# Patient Record
Sex: FEMALE | Race: BLACK OR AFRICAN AMERICAN | NOT HISPANIC OR LATINO | Employment: OTHER | URBAN - METROPOLITAN AREA
[De-identification: names, ages, dates, MRNs, and addresses within clinical notes are randomized per-mention and may not be internally consistent; named-entity substitution may affect disease eponyms.]

---

## 2017-01-05 ENCOUNTER — OFFICE VISIT (OUTPATIENT)
Dept: SURGERY | Facility: CLINIC | Age: 62
End: 2017-01-05
Payer: COMMERCIAL

## 2017-01-05 VITALS
HEIGHT: 67 IN | BODY MASS INDEX: 36.04 KG/M2 | SYSTOLIC BLOOD PRESSURE: 134 MMHG | TEMPERATURE: 98 F | WEIGHT: 229.63 LBS | HEART RATE: 78 BPM | DIASTOLIC BLOOD PRESSURE: 71 MMHG

## 2017-01-05 DIAGNOSIS — Z09 POSTOP CHECK: Primary | ICD-10-CM

## 2017-01-05 DIAGNOSIS — E04.2 MULTINODULAR GOITER (NONTOXIC): ICD-10-CM

## 2017-01-05 PROCEDURE — 99999 PR PBB SHADOW E&M-EST. PATIENT-LVL III: CPT | Mod: PBBFAC,,, | Performed by: SURGERY

## 2017-01-05 PROCEDURE — 99024 POSTOP FOLLOW-UP VISIT: CPT | Mod: S$GLB,,, | Performed by: SURGERY

## 2017-01-05 NOTE — PROGRESS NOTES
"Subjective:       Masha Garcia presents to the clinic 1 weeks following total thyroidectomy. Eating a regular diet without difficulty. Bowel movements are Normal.  Pain is controlled without any medications.. Patient admits voice changes. Patient denies trouble swallowing, trouble speaking and fingers, toes and perioral numbness or tingling. She is currently on Calcium or Rocaltrol. Patient is taking Levothyroxine (125 mcg daily).       Drain with less than 5 mL daily over the last 3 days.  Objective:        Visit Vitals    /71 (BP Location: Left arm, Patient Position: Sitting, BP Method: Automatic)    Pulse 78    Temp 98.4 °F (36.9 °C)    Ht 5' 7" (1.702 m)    Wt 104.1 kg (229 lb 9.6 oz)    BMI 35.96 kg/m2       General:   alert, appears stated age and cooperative. Chovstek's sign absent.   Incision:   well approximated, healing well, no signs of drainage, no erythema, no dehiscence, no hematoma, no ecchymosis, no seroma and swelling (moderate) along the superior portion of the incision  Drained removed   Voice:  strained       FINAL PATHOLOGIC DIAGNOSIS  1. AND 3. BIOPSIES OF POSSIBLE PARATHYROID TISSUE:  PARATHYROID TISSUE WITH NO SIGNIFICANT MORPHOLOGIC ALTERATION  2. 59 G THYROID:  HYPERPLASTIC NODULAR GOITER    Labs:  Lab Results   Component Value Date    CALCIUM 10.2 11/17/2016    CALCIUM 9.9 09/26/2016    CALCIUM 10.4 03/05/2016    TSH 1.968 03/05/2016    TSH 1.992 08/10/2013    TSH 1.52 07/28/2009    [id number] 42 03/05/2016    [id number] 42 07/15/2014    [id number] 41 01/04/2014    PTH 75.0 12/29/2016    PTH 47 07/15/2014    PHOS 3.3 07/15/2014    CAION 1.20 12/28/2016            Assessment:     Masha Garcia is doing well post-operatively after total thyroidectomy despite continued swelling.         Plan:        1. Continue any current medications.  2. Wound care and scar massage discussed.  3. Pt is to increase activities as tolerated.  4. Follow up with endocrinology for the " possibility for further treatment.  The patient will follow-up with me in 6 weeks to ensure that she is healing appropriately.

## 2017-01-05 NOTE — PATIENT INSTRUCTIONS
"Questions About Your Scar    1. Why is my scar red?    You will notice during the first few weeks after surgery, the scar area will become red, firm, and hard. Scars often seem to become worse before they get better. This is normal. After about 6 weeks, the scar will begin to "mature." This means that the scar will soften and become less red. Over the next 4 months, the scar will slowly soft and will not be as red. It may take up to a year for the scar to mature.    2. What will the scar look like?    Most scars will become soft, flat, white lines over time.    3. When will the scars go away?    A scar is usually permanent. As the scar becomes softer and less red, it will begin to blend into the skin around the scar. It will become less visible.    4. Does putting Vitamin E oil on the scar help?    It is not known for sure if Vitamin E oil helps scars heal faster or makes them less visible. If you choose to use Vitamin E oil on the scar, it won't hurt. Applying any oil or lotion that can add moisture to the skin will help. Two weeks after surgery, it is a good idea to massage a scar gently but firmly for 5 minutes, two to four times each day. Use the oil or lotion of your choice when doing the massage.    5. What about sunlight and scars?    It is important to keep out of direct sunlight for up to one year after surgery. Too much direct sunlight makes a scar darker in color than the skin around the scar. You should use sunscreen with at least SPF #15 when outdoors.    6. What can I do to make the scar look better?    IT simply takes time for a scar to heal. If the scar is less than a year old, the scar may need to mature more. But some scars may get worse. If the scar line has become wide, more surgery can remove the wide area of the scar and make a more narrow line. If the scar has become higher, thicker, and redder, this may be a "keloidal" or "hypertrophic" scar.      Instructions for Scar Tissue Massage    The " "purpose of scar tissue massage is to soften the skin over scars and incision lines in order for the area to heal optimally. This exercise may also help prevent hypertrophic scarring (an area of excessive scar tissue).    A scar begins "maturing" at about three weeks after injury or surgery, and continues for 9 - 12 months.    You may begin performing scar tissue massage two to three weeks after your surgery. The pressure you apply initially should not be painful, rather a light massage to promote circulation. As you are able to tolerate more pressure you may press more firmly on any little bumps or lumps along your incision.    Scar tissue massage is done by using your index and middle fingers. Begin by firmly and systematically applying the lotion in a circular motion across the scar/incision line. The doctor or nurse will demonstrate this method. Imagine your massage breaking up the little cells that have begun to collect at the incision site so that your body may reabsorb those cells.    This exercise should be done consistently for five minutes at a time; once in the morning and once in the evening.    You may use any skin moisturizer or cocoa butter and/or vitamin E cream to perform scar tissue massage.    It is important to remember that the effectiveness of scar tissue massage is obtained from the method in which it is done rather than the type of moisturizer used.    Scar tissue massage should be continued throughout the scar or incision's maturing process (9 - 12 months) as instructed.    It is necessary to protect your scar/incision from the sun's damaging rays for 9 - 12 months after surgery. A SPF (Sun Protection Factor) of 15 or 30 is adequate for most skin types.  "

## 2017-01-05 NOTE — MR AVS SNAPSHOT
[facility]  [address]  [place] [address]  Phone: [phone]                  Masha Garcia   2017 10:00 AM   Office Visit    Description:  Female : 1955   Provider:  Christina Emmanuel MD   Department:  [facility]           Reason for Visit     Post-op Evaluation                To Do List           Future Appointments        Provider Department Dept Phone    2017 10:00 AM Christina Emmanuel MD [facility] [phone]      Goals (5 Years of Data)              9/26/16    3/5/16    8/28/15    HEMOGLOBIN A1C < 7.0   7.4  7.2  7.8    Related Problems    Uncontrolled type 2 diabetes mellitus without complication      [facility]     [facility] On Call Nurse Care Line -  Assistance  Registered nurses in the [facility] On Call Center provide clinical advisement, health education, appointment booking, and other advisory services.  Call for this free service at [phone].             Medications           Message regarding Medications     Verify the changes and/or additions to your medication regime listed below are the same as discussed with your clinician today.  If any of these changes or additions are incorrect, please notify your healthcare provider.             Verify that the below list of medications is an accurate representation of the medications you are currently taking.  If none reported, the list may be blank. If incorrect, please contact your healthcare provider. Carry this list with you in case of emergency.           Current Medications     acetaminophen (TYLENOL) 325 MG tablet Take 2 tablets (650 mg total) by mouth every 4 to 6 hours as needed for Pain.    anastrozole (ARIMIDEX) 1 mg Tab Take 1 mg by mouth every morning.     calcium carbonate-vitamin D3 (CALCIUM 600 + D,3,) 600 mg calcium- 200 unit Cap Take 1 tablet by mouth every morning.     glipiZIDE (GLUCOTROL) 10 MG TR24 TAKE ONE TABLET BY MOUTH TWICE  "DAILY    hydrochlorothiazide (HYDRODIURIL) 25 MG tablet TAKE ONE TABLET BY MOUTH ONCE DAILY    levothyroxine (SYNTHROID) 125 MCG tablet Take 1 tablet (125 mcg total) by mouth before breakfast.    loratadine (CLARITIN) 10 mg tablet Take 10 mg by mouth daily as needed.     losartan (COZAAR) 25 MG tablet TAKE ONE TABLET BY MOUTH ONCE DAILY    lovastatin (MEVACOR) 20 MG tablet TAKE ONE TABLET BY MOUTH ONCE DAILY    metformin (GLUCOPHAGE) 1000 MG tablet Take 1 tablet (1,000 mg total) by mouth 2 (two) times daily.    oxycodone-acetaminophen (PERCOCET) 5-325 mg per tablet Take 1 tablet by mouth every 4 (four) hours as needed for Pain.    TRADJENTA 5 mg Tab tablet TAKE ONE TABLET BY MOUTH ONCE DAILY           Clinical Reference Information           Vital Signs - Last Recorded  Most recent update: 1/5/2017 10:13 AM by Sue Dennis MA    BP Pulse Temp Ht Wt BMI    134/71 (BP Location: Left arm, Patient Position: Sitting, BP Method: Automatic) 78 98.4 °F (36.9 °C) 5' 7" (1.702 m) 104.1 kg (229 lb 9.6 oz) 35.96 kg/m2      Blood Pressure          Most Recent Value    BP  134/71      Allergies as of 1/5/2017     Ace Inhibitors    Penicillins      Immunizations Administered on Date of Encounter - 1/5/2017     None      [facility] Sign-Up     Activating your [facility] account is as easy as 1-2-3!     1) Visit [email or web address], select Sign Up Now, enter this activation code and your date of birth, then select Next.  [email or web address]  Expires: 2/19/2017 11:26 AM      2) Create a username and password to use when you visit [facility] in the future and select a security question in case you lose your password and select Next.    3) Enter your e-mail address and click Sign Up!    Additional Information  If you have questions, please e-mail [email or web address] or call [phone] to talk to our [facility] staff. Remember, [facility] is NOT to be used for urgent needs. For medical emergencies, dial 911.         Instructions  " "  Questions About Your Scar    1. Why is my scar red?    You will notice during the first few weeks after surgery, the scar area will become red, firm, and hard. Scars often seem to become worse before they get better. This is normal. After about 6 weeks, the scar will begin to "mature." This means that the scar will soften and become less red. Over the next 4 months, the scar will slowly soft and will not be as red. It may take up to a year for the scar to mature.    2. What will the scar look like?    Most scars will become soft, flat, white lines over time.    3. When will the scars go away?    A scar is usually permanent. As the scar becomes softer and less red, it will begin to blend into the skin around the scar. It will become less visible.    4. Does putting Vitamin E oil on the scar help?    It is not known for sure if Vitamin E oil helps scars heal faster or makes them less visible. If you choose to use Vitamin E oil on the scar, it won't hurt. Applying any oil or lotion that can add moisture to the skin will help. Two weeks after surgery, it is a good idea to massage a scar gently but firmly for 5 minutes, two to four times each day. Use the oil or lotion of your choice when doing the massage.    5. What about sunlight and scars?    It is important to keep out of direct sunlight for up to one year after surgery. Too much direct sunlight makes a scar darker in color than the skin around the scar. You should use sunscreen with at least SPF #15 when outdoors.    6. What can I do to make the scar look better?    IT simply takes time for a scar to heal. If the scar is less than a year old, the scar may need to mature more. But some scars may get worse. If the scar line has become wide, more surgery can remove the wide area of the scar and make a more narrow line. If the scar has become higher, thicker, and redder, this may be a "keloidal" or "hypertrophic" scar.      Instructions for Scar Tissue Massage    The " "purpose of scar tissue massage is to soften the skin over scars and incision lines in order for the area to heal optimally. This exercise may also help prevent hypertrophic scarring (an area of excessive scar tissue).    A scar begins "maturing" at about three weeks after injury or surgery, and continues for 9 - 12 months.    You may begin performing scar tissue massage two to three weeks after your surgery. The pressure you apply initially should not be painful, rather a light massage to promote circulation. As you are able to tolerate more pressure you may press more firmly on any little bumps or lumps along your incision.    Scar tissue massage is done by using your index and middle fingers. Begin by firmly and systematically applying the lotion in a circular motion across the scar/incision line. The doctor or nurse will demonstrate this method. Imagine your massage breaking up the little cells that have begun to collect at the incision site so that your body may reabsorb those cells.    This exercise should be done consistently for five minutes at a time; once in the morning and once in the evening.    You may use any skin moisturizer or cocoa butter and/or vitamin E cream to perform scar tissue massage.    It is important to remember that the effectiveness of scar tissue massage is obtained from the method in which it is done rather than the type of moisturizer used.    Scar tissue massage should be continued throughout the scar or incision's maturing process (9 - 12 months) as instructed.    It is necessary to protect your scar/incision from the sun's damaging rays for 9 - 12 months after surgery. A SPF (Sun Protection Factor) of 15 or 30 is adequate for most skin types.       "

## 2017-01-09 RX ORDER — METFORMIN HYDROCHLORIDE 1000 MG/1
TABLET ORAL
Qty: 180 TABLET | Refills: 0 | Status: SHIPPED | OUTPATIENT
Start: 2017-01-09 | End: 2017-04-24 | Stop reason: SDUPTHER

## 2017-01-09 RX ORDER — LINAGLIPTIN 5 MG/1
TABLET, FILM COATED ORAL
Qty: 30 TABLET | Refills: 0 | Status: SHIPPED | OUTPATIENT
Start: 2017-01-09 | End: 2017-02-07 | Stop reason: SDUPTHER

## 2017-02-03 ENCOUNTER — TELEPHONE (OUTPATIENT)
Dept: FAMILY MEDICINE | Facility: CLINIC | Age: 62
End: 2017-02-03

## 2017-02-03 DIAGNOSIS — I10 ESSENTIAL HYPERTENSION: Primary | ICD-10-CM

## 2017-02-03 DIAGNOSIS — E78.5 HYPERLIPIDEMIA WITH TARGET LDL LESS THAN 100: ICD-10-CM

## 2017-02-03 RX ORDER — LOVASTATIN 20 MG/1
TABLET ORAL
Qty: 90 TABLET | Refills: 0 | Status: SHIPPED | OUTPATIENT
Start: 2017-02-03 | End: 2017-05-11 | Stop reason: SDUPTHER

## 2017-02-03 NOTE — TELEPHONE ENCOUNTER
----- Message from Linda Mckeon sent at 2/3/2017 10:22 AM CST -----  Contact: self  Pt is requesting lab orders prior to 4/4/2017 OV. Please call pt once orders are placed. [phone]          Thanks

## 2017-02-07 RX ORDER — LINAGLIPTIN 5 MG/1
TABLET, FILM COATED ORAL
Qty: 30 TABLET | Refills: 0 | Status: SHIPPED | OUTPATIENT
Start: 2017-02-07 | End: 2017-03-13 | Stop reason: SDUPTHER

## 2017-02-09 ENCOUNTER — OFFICE VISIT (OUTPATIENT)
Dept: SURGERY | Facility: CLINIC | Age: 62
End: 2017-02-09
Payer: COMMERCIAL

## 2017-02-09 VITALS
SYSTOLIC BLOOD PRESSURE: 125 MMHG | BODY MASS INDEX: 36.54 KG/M2 | DIASTOLIC BLOOD PRESSURE: 72 MMHG | TEMPERATURE: 98 F | WEIGHT: 232.81 LBS | HEIGHT: 67 IN | HEART RATE: 75 BPM

## 2017-02-09 DIAGNOSIS — Z09 POSTOP CHECK: Primary | ICD-10-CM

## 2017-02-09 DIAGNOSIS — E04.2 MULTINODULAR GOITER (NONTOXIC): ICD-10-CM

## 2017-02-09 PROCEDURE — 99999 PR PBB SHADOW E&M-EST. PATIENT-LVL III: CPT | Mod: PBBFAC,,, | Performed by: SURGERY

## 2017-02-09 PROCEDURE — 99024 POSTOP FOLLOW-UP VISIT: CPT | Mod: S$GLB,,, | Performed by: SURGERY

## 2017-02-09 NOTE — PROGRESS NOTES
"Subjective:       Masha Garcia presents to the clinic 6 week following total thyroidectomy. Eating a regular diet without difficulty. Bowel movements are Normal.  Pain is controlled without any medications..  Patient states she does still have some hoarseness but it is improved since last visit. Patient denies trouble swallowing, trouble speaking and fingers, toes and perioral numbness or tingling. She is currently on Calcium or Rocaltrol. Patient is taking Levothyroxine (125 mcg daily).         Objective:        Visit Vitals    Ht 5' 7" (1.702 m)    Wt 105.6 kg (232 lb 12.8 oz)    BMI 36.46 kg/m2       General:   alert, appears stated age and cooperative. Chovstek's sign absent.   Incision:   well approximated, healing well, no signs of drainage, no erythema, no dehiscence, no hematoma, no ecchymosis, no seroma and swelling (minimal) along the superior portion of the incision     Voice:  strained but improved       FINAL PATHOLOGIC DIAGNOSIS  1. AND 3. BIOPSIES OF POSSIBLE PARATHYROID TISSUE:  PARATHYROID TISSUE WITH NO SIGNIFICANT MORPHOLOGIC ALTERATION  2. 59 G THYROID:  HYPERPLASTIC NODULAR GOITER    Labs:  Lab Results   Component Value Date    CALCIUM 10.2 11/17/2016    CALCIUM 9.9 09/26/2016    CALCIUM 10.4 03/05/2016    TSH 1.968 03/05/2016    TSH 1.992 08/10/2013    TSH 1.52 07/28/2009    [id number] 42 03/05/2016    [id number] 42 07/15/2014    [id number] 41 01/04/2014    PTH 75.0 12/29/2016    PTH 47 07/15/2014    PHOS 3.3 07/15/2014    CAION 1.20 12/28/2016            Assessment:     Masha Garcia is doing well post-operatively after total thyroidectomy , swelling much improved.  Voice continues to improve        Plan:        1. Continue any current medications.  2. Wound care and scar massage discussed.  3. Activity as tolerated.  4. Follow up with endocrinology scheduled to determine need for synthroid adjustment  5. Patient not interested in seeing a voice therapist     "

## 2017-03-02 DIAGNOSIS — I10 ESSENTIAL HYPERTENSION: ICD-10-CM

## 2017-03-02 RX ORDER — HYDROCHLOROTHIAZIDE 25 MG/1
TABLET ORAL
Qty: 90 TABLET | Refills: 0 | Status: SHIPPED | OUTPATIENT
Start: 2017-03-02 | End: 2017-05-11 | Stop reason: SDUPTHER

## 2017-03-07 DIAGNOSIS — I10 ESSENTIAL HYPERTENSION: Primary | ICD-10-CM

## 2017-03-07 RX ORDER — LOSARTAN POTASSIUM 25 MG/1
25 TABLET ORAL DAILY
Qty: 90 TABLET | Refills: 0 | Status: SHIPPED | OUTPATIENT
Start: 2017-03-07 | End: 2017-06-08 | Stop reason: SDUPTHER

## 2017-03-07 NOTE — TELEPHONE ENCOUNTER
----- Message from Teri Valdes sent at 3/6/2017  4:46 PM CST -----  Contact: Carissa (daughter )[phone]  Pt is requesting a refill on losartan (COZAAR) 25 MG tablet Please call  at your earliest convenience.  Thanks !

## 2017-03-13 RX ORDER — LINAGLIPTIN 5 MG/1
TABLET, FILM COATED ORAL
Qty: 30 TABLET | Refills: 0 | Status: SHIPPED | OUTPATIENT
Start: 2017-03-13 | End: 2017-04-12 | Stop reason: SDUPTHER

## 2017-04-01 ENCOUNTER — LAB VISIT (OUTPATIENT)
Dept: LAB | Facility: HOSPITAL | Age: 62
End: 2017-04-01
Attending: INTERNAL MEDICINE
Payer: COMMERCIAL

## 2017-04-01 DIAGNOSIS — E78.5 HYPERLIPIDEMIA WITH TARGET LDL LESS THAN 100: ICD-10-CM

## 2017-04-01 DIAGNOSIS — I10 ESSENTIAL HYPERTENSION: ICD-10-CM

## 2017-04-01 LAB
ALBUMIN SERPL BCP-MCNC: 3.4 G/DL
ALP SERPL-CCNC: 86 U/L
ALT SERPL W/O P-5'-P-CCNC: 19 U/L
ANION GAP SERPL CALC-SCNC: 9 MMOL/L
AST SERPL-CCNC: 22 U/L
BASOPHILS # BLD AUTO: 0.04 K/UL
BASOPHILS NFR BLD: 0.3 %
BILIRUB SERPL-MCNC: 0.9 MG/DL
BUN SERPL-MCNC: 12 MG/DL
CALCIUM SERPL-MCNC: 10.3 MG/DL
CHLORIDE SERPL-SCNC: 105 MMOL/L
CHOLEST/HDLC SERPL: 2.9 {RATIO}
CO2 SERPL-SCNC: 29 MMOL/L
CREAT SERPL-MCNC: 0.9 MG/DL
DIFFERENTIAL METHOD: ABNORMAL
EOSINOPHIL # BLD AUTO: 0.1 K/UL
EOSINOPHIL NFR BLD: 1.2 %
ERYTHROCYTE [DISTWIDTH] IN BLOOD BY AUTOMATED COUNT: 14.8 %
EST. GFR  (AFRICAN AMERICAN): >60 ML/MIN/1.73 M^2
EST. GFR  (NON AFRICAN AMERICAN): >60 ML/MIN/1.73 M^2
GLUCOSE SERPL-MCNC: 122 MG/DL
HCT VFR BLD AUTO: 33.6 %
HDL/CHOLESTEROL RATIO: 34.3 %
HDLC SERPL-MCNC: 137 MG/DL
HDLC SERPL-MCNC: 47 MG/DL
HGB BLD-MCNC: 11.2 G/DL
LDLC SERPL CALC-MCNC: 67.4 MG/DL
LYMPHOCYTES # BLD AUTO: 3.5 K/UL
LYMPHOCYTES NFR BLD: 30.6 %
MCH RBC QN AUTO: 27.6 PG
MCHC RBC AUTO-ENTMCNC: 33.3 %
MCV RBC AUTO: 83 FL
MONOCYTES # BLD AUTO: 0.5 K/UL
MONOCYTES NFR BLD: 4.2 %
NEUTROPHILS # BLD AUTO: 7.3 K/UL
NEUTROPHILS NFR BLD: 63.3 %
NONHDLC SERPL-MCNC: 90 MG/DL
PLATELET # BLD AUTO: 295 K/UL
PMV BLD AUTO: 10.8 FL
POTASSIUM SERPL-SCNC: 3.6 MMOL/L
PROT SERPL-MCNC: 7 G/DL
RBC # BLD AUTO: 4.06 M/UL
SODIUM SERPL-SCNC: 143 MMOL/L
TRIGL SERPL-MCNC: 113 MG/DL
WBC # BLD AUTO: 11.56 K/UL

## 2017-04-01 PROCEDURE — 36415 COLL VENOUS BLD VENIPUNCTURE: CPT | Mod: PO

## 2017-04-01 PROCEDURE — 85025 COMPLETE CBC W/AUTO DIFF WBC: CPT

## 2017-04-01 PROCEDURE — 83036 HEMOGLOBIN GLYCOSYLATED A1C: CPT

## 2017-04-01 PROCEDURE — 80061 LIPID PANEL: CPT

## 2017-04-01 PROCEDURE — 80053 COMPREHEN METABOLIC PANEL: CPT

## 2017-04-03 ENCOUNTER — OFFICE VISIT (OUTPATIENT)
Dept: FAMILY MEDICINE | Facility: CLINIC | Age: 62
End: 2017-04-03
Payer: COMMERCIAL

## 2017-04-03 VITALS
WEIGHT: 231.25 LBS | BODY MASS INDEX: 36.29 KG/M2 | HEIGHT: 67 IN | HEART RATE: 70 BPM | OXYGEN SATURATION: 96 % | SYSTOLIC BLOOD PRESSURE: 104 MMHG | DIASTOLIC BLOOD PRESSURE: 74 MMHG | TEMPERATURE: 98 F

## 2017-04-03 DIAGNOSIS — E78.5 HYPERLIPIDEMIA WITH TARGET LDL LESS THAN 100: ICD-10-CM

## 2017-04-03 DIAGNOSIS — D57.3 SICKLE CELL TRAIT: ICD-10-CM

## 2017-04-03 DIAGNOSIS — I10 ESSENTIAL HYPERTENSION: ICD-10-CM

## 2017-04-03 DIAGNOSIS — E55.9 VITAMIN D DEFICIENCY DISEASE: ICD-10-CM

## 2017-04-03 DIAGNOSIS — E66.01 SEVERE OBESITY (BMI 35.0-35.9 WITH COMORBIDITY): ICD-10-CM

## 2017-04-03 DIAGNOSIS — M85.80 OSTEOPENIA, UNSPECIFIED LOCATION: ICD-10-CM

## 2017-04-03 DIAGNOSIS — E89.0 HYPOTHYROIDISM, POSTSURGICAL: ICD-10-CM

## 2017-04-03 DIAGNOSIS — C50.511 MALIGNANT NEOPLASM OF LOWER-OUTER QUADRANT OF RIGHT FEMALE BREAST: ICD-10-CM

## 2017-04-03 DIAGNOSIS — Z00.00 ROUTINE MEDICAL EXAM: Primary | ICD-10-CM

## 2017-04-03 LAB
CREAT UR-MCNC: 184 MG/DL
ESTIMATED AVG GLUCOSE: 157 MG/DL
HBA1C MFR BLD HPLC: 7.1 %
MICROALBUMIN UR DL<=1MG/L-MCNC: 33 UG/ML
MICROALBUMIN/CREATININE RATIO: 17.9 UG/MG

## 2017-04-03 PROCEDURE — 82570 ASSAY OF URINE CREATININE: CPT

## 2017-04-03 PROCEDURE — 99999 PR PBB SHADOW E&M-EST. PATIENT-LVL III: CPT | Mod: PBBFAC,,, | Performed by: INTERNAL MEDICINE

## 2017-04-03 PROCEDURE — 3078F DIAST BP <80 MM HG: CPT | Mod: S$GLB,,, | Performed by: INTERNAL MEDICINE

## 2017-04-03 PROCEDURE — 3074F SYST BP LT 130 MM HG: CPT | Mod: S$GLB,,, | Performed by: INTERNAL MEDICINE

## 2017-04-03 PROCEDURE — 99396 PREV VISIT EST AGE 40-64: CPT | Mod: S$GLB,,, | Performed by: INTERNAL MEDICINE

## 2017-04-03 NOTE — MR AVS SNAPSHOT
[facility]  [address]  [place] [address]  Phone: [phone]  Fax: [phone]                  Masha Garcia   4/3/2017 9:00 AM   Office Visit    Description:  Female : 1955   Provider:  Brenda Bailey MD   Department:  [facility]           Reason for Visit     Follow-up           Diagnoses this Visit        Comments    Routine medical exam    -  Primary     Uncontrolled type 2 diabetes mellitus without complication, without long-term current use of insulin         Essential hypertension         Hyperlipidemia with target LDL less than 100         Osteopenia, unspecified location         Vitamin D deficiency disease         Malignant neoplasm of lower-outer quadrant of right female breast         Sickle cell trait         Hypothyroidism, postsurgical         Severe obesity (BMI 35.0-35.9 with comorbidity)                To Do List           Goals (5 Years of Data)              9/26/16    3/5/16    8/28/15    HEMOGLOBIN A1C < 7.0   7.4  7.2  7.8    Related Problems    Uncontrolled type 2 diabetes mellitus without complication      Follow-Up and Disposition     Return in about 6 months (around 10/3/2017), or if symptoms worsen or fail to improve, for follow up chronic medical conditions..      [facility]     [facility] On Call Nurse Care Line -  Assistance  Unless otherwise directed by your provider, please contact [facility] On-Call, our nurse care line that is available for  assistance.     Registered nurses in the [facility] On Call Center provide: appointment scheduling, clinical advisement, health education, and other advisory services.  Call: [phone] (toll free)               Medications           Message regarding Medications     Verify the changes and/or additions to your medication regime listed below are the same as discussed with your clinician today.  If any of these changes or additions are incorrect, please notify your healthcare  "provider.             Verify that the below list of medications is an accurate representation of the medications you are currently taking.  If none reported, the list may be blank. If incorrect, please contact your healthcare provider. Carry this list with you in case of emergency.           Current Medications     acetaminophen (TYLENOL) 325 MG tablet Take 2 tablets (650 mg total) by mouth every 4 to 6 hours as needed for Pain.    anastrozole (ARIMIDEX) 1 mg Tab Take 1 mg by mouth every morning.     calcium carbonate-vitamin D3 (CALCIUM 600 + D,3,) 600 mg calcium- 200 unit Cap Take 1 tablet by mouth every morning.     glipiZIDE (GLUCOTROL) 10 MG TR24 TAKE ONE TABLET BY MOUTH TWICE DAILY    hydrochlorothiazide (HYDRODIURIL) 25 MG tablet TAKE ONE TABLET BY MOUTH ONCE DAILY    levothyroxine (SYNTHROID) 112 MCG tablet Take 1 tablet (112 mcg total) by mouth before breakfast.    loratadine (CLARITIN) 10 mg tablet Take 10 mg by mouth daily as needed.     losartan (COZAAR) 25 MG tablet Take 1 tablet (25 mg total) by mouth once daily.    lovastatin (MEVACOR) 20 MG tablet TAKE ONE TABLET BY MOUTH ONCE DAILY    metformin (GLUCOPHAGE) 1000 MG tablet TAKE ONE TABLET BY MOUTH TWICE DAILY    TRADJENTA 5 mg Tab tablet TAKE ONE TABLET BY MOUTH ONCE DAILY    oxycodone-acetaminophen (PERCOCET) 5-325 mg per tablet Take 1 tablet by mouth every 4 (four) hours as needed for Pain.           Clinical Reference Information           Your Vitals Were     BP Pulse Temp Height Weight SpO2    104/74 70 98.3 °F (36.8 °C) (Oral) 5' 7" (1.702 m) 104.9 kg (231 lb 4.2 oz) 96%    BMI                36.22 kg/m2          Blood Pressure          Most Recent Value    BP  104/74      Allergies as of 4/3/2017     Ace Inhibitors    Penicillins      Immunizations Administered on Date of Encounter - 4/3/2017     None      Orders Placed During Today's Visit      Normal Orders This Visit    Microalbumin/creatinine urine ratio       Language Assistance Services  "    ATTENTION: Language assistance services are available, free of charge. Please call [phone].      ATENCIÓN: Si habla tessie, tiene a daley disposición servicios gratuitos de asistencia lingüística. Llame al [phone].     CHÚ Ý: N?u b?n nói Ti?ng Vi?t, có các d?ch v? h? tr? ngôn ng? mi?n phí dành cho b?n. G?i s? [phone].         [facility] complies with applicable Federal civil rights laws and does not discriminate on the basis of race, color, national origin, age, disability, or sex.

## 2017-04-03 NOTE — PROGRESS NOTES
HISTORY OF PRESENT ILLNESS:  Masha Garcia is a 62 y.o. female who presents to the clinic today for a routine medical physical exam. Her last physical exam was approximately 1 years(s) ago.    Contraception: no method      PAST MEDICAL HISTORY:  Past Medical History:   Diagnosis Date    AR (allergic rhinitis)     Breast cancer 06/2013    right; followed by oncology, Dr. Oliveira; s/p lumpectomy and  radiation    History of colonic polyps     Hyperlipidemia LDL goal < 100     Hypertension     Lower extremity edema     Mild chronic anemia     Obesity     Osteopenia     Sickle cell trait     Thyroid nodule     Type II or unspecified type diabetes mellitus without mention of complication, uncontrolled     Vitamin D deficiency disease        PAST SURGICAL HISTORY:  Past Surgical History:   Procedure Laterality Date    benign right breast biopsy  2012    lumpectomy right breast  6/2013    right ankle fracture repair      TOTAL ABDOMINAL HYSTERECTOMY      TOTAL THYROIDECTOMY  12/28/2016       SOCIAL HISTORY:  Social History     Social History    Marital status:      Spouse name: N/A    Number of children: 3    Years of education: N/A     Occupational History    [employer]      Social History Main Topics    Smoking status: Never Smoker    Smokeless tobacco: Not on file    Alcohol use No    Drug use: Not on file    Sexual activity: Not on file     Other Topics Concern    Not on file     Social History Narrative       FAMILY HISTORY:  Family History   Problem Relation Age of Onset    Diabetes Mother     Hypertension Mother     Hypertension Father     Prostate cancer Father     Diabetes Brother     Cancer Neg Hx     Breast cancer Neg Hx     Colon cancer Neg Hx     Ovarian cancer Neg Hx     Amblyopia Neg Hx     Blindness Neg Hx     Cataracts Neg Hx     Glaucoma Neg Hx     Macular degeneration Neg Hx     Retinal detachment Neg Hx     Strabismus Neg Hx     Stroke Neg Hx     Thyroid  disease Neg Hx        ALLERGIES AND MEDICATIONS: updated and reviewed.  Review of patient's allergies indicates:   Allergen Reactions    Ace inhibitors Other (See Comments)     cough    Penicillins Hives     Medication List with Changes/Refills   Current Medications    ACETAMINOPHEN (TYLENOL) 325 MG TABLET    Take 2 tablets (650 mg total) by mouth every 4 to 6 hours as needed for Pain.    ANASTROZOLE (ARIMIDEX) 1 MG TAB    Take 1 mg by mouth every morning.     CALCIUM CARBONATE-VITAMIN D3 (CALCIUM 600 + D,3,) 600 MG CALCIUM- 200 UNIT CAP    Take 1 tablet by mouth every morning.     GLIPIZIDE (GLUCOTROL) 10 MG TR24    TAKE ONE TABLET BY MOUTH TWICE DAILY    HYDROCHLOROTHIAZIDE (HYDRODIURIL) 25 MG TABLET    TAKE ONE TABLET BY MOUTH ONCE DAILY    LEVOTHYROXINE (SYNTHROID) 112 MCG TABLET    Take 1 tablet (112 mcg total) by mouth before breakfast.    LORATADINE (CLARITIN) 10 MG TABLET    Take 10 mg by mouth daily as needed.     LOSARTAN (COZAAR) 25 MG TABLET    Take 1 tablet (25 mg total) by mouth once daily.    LOVASTATIN (MEVACOR) 20 MG TABLET    TAKE ONE TABLET BY MOUTH ONCE DAILY    METFORMIN (GLUCOPHAGE) 1000 MG TABLET    TAKE ONE TABLET BY MOUTH TWICE DAILY    OXYCODONE-ACETAMINOPHEN (PERCOCET) 5-325 MG PER TABLET    Take 1 tablet by mouth every 4 (four) hours as needed for Pain.    TRADJENTA 5 MG TAB TABLET    TAKE ONE TABLET BY MOUTH ONCE DAILY             SCREENING HISTORY:  Health Maintenance       Date Due Completion Date    Zoster Vaccine 3/9/2015 ---    Urine Microalbumin 2/9/2016 2/9/2015    Hemoglobin A1c 3/26/2017 9/26/2016    Eye Exam 5/11/2017 5/11/2016    Override on 12/9/2014: Done    Override on 11/1/2010: Done    Mammogram 6/15/2017 6/15/2016 (Done)    Override on 6/15/2016: Done (done at [facility] normal scanned report to chart)    Override on 3/18/2015: Done    Override on 3/1/2014: Done ([facility])    Override on 4/1/2013: Done    Override on 2/1/2012: Done    Foot Exam 9/28/2017  "9/28/2016 (Done)    Override on 9/28/2016: Done (normal see note in visit)    Lipid Panel 4/1/2018 4/1/2017    Colonoscopy 6/13/2019 6/13/2014 (Done)    Override on 6/13/2014: Done (Normal per Dr. Vallejo)    TETANUS VACCINE 7/24/2019 7/24/2009    Pneumococcal PPSV23 (Medium Risk) (2) 3/9/2020 8/13/2008            REVIEW OF SYSTEMS:   The patient reports fair dietary habits.  The patient does not exercise regularly.  General ROS: negative for - chills, fever, malaise or weight loss  Psychological ROS: negative for - anxiety, depression, sleep disturbances or suicidal ideation  Ophthalmic ROS: negative for - blurry vision or eye pain  ENT ROS: negative for - epistaxis, headaches, nasal congestion, oral lesions or sore throat  Allergy and Immunology ROS: negative for - hives  Hematological and Lymphatic ROS: negative for - swollen lymph nodes  Endocrine ROS: negative for - polydipsia/polyuria or temperature intolerance  Respiratory ROS: no cough, shortness of breath, or wheezing  Cardiovascular ROS: no chest pain or dyspnea on exertion  Gastrointestinal ROS: no abdominal pain, change in bowel habits, or black or bloody stools  Genito-Urinary ROS: no dysuria, trouble voiding, or hematuria  Breast ROS: negative for breast lumps  Musculoskeletal ROS: negative for - gait disturbance, joint swelling, muscle pain or muscular weakness  Neurological ROS: no TIA or stroke symptoms  Dermatological ROS: negative for mole changes and rash    Physical Examination:   Vitals:    04/03/17 0909   BP: 104/74   Pulse: 70   Temp: 98.3 °F (36.8 °C)     Weight: 104.9 kg (231 lb 4.2 oz)   Height: 5' 7" (170.2 cm)   Body mass index is 36.22 kg/(m^2).    General appearance - alert, well appearing, and in no distress and obese  Mental status - alert, oriented to person, place, and time, normal mood, behavior, speech, dress, motor activity, and thought processes  Eyes - pupils equal and reactive, extraocular eye movements intact, sclera " anicteric  Mouth - mucous membranes moist, pharynx normal without lesions  Neck - supple, no significant adenopathy, carotids upstroke normal bilaterally, no bruits, thyroid exam: Thyroidectomy Scar Noted - healing well  Lymphatics - no palpable lymphadenopathy  Chest - clear to auscultation, no wheezes, rales or rhonchi, symmetric air entry  Heart - normal rate and regular rhythm, no gallops noted  Abdomen - soft, nontender, nondistended, no masses or organomegaly  Breasts - not examined  Back exam - full range of motion, no tenderness, palpable spasm or pain on motion  Neurological - alert, oriented, normal speech, no focal findings or movement disorder noted, cranial nerves II through XII intact  Musculoskeletal - no muscular tenderness noted, Mild-Moderate osteoarthritic changes noted to both knee joints. No joint effusions noted.   Extremities - pedal edema trace +, intact peripheral pulses  Skin - normal coloration and turgor, no rashes, no suspicious skin lesions noted      Results for orders placed or performed in visit on 04/01/17   CBC auto differential   Result Value Ref Range    WBC 11.56 3.90 - 12.70 K/uL    RBC 4.06 4.00 - 5.40 M/uL    Hemoglobin 11.2 (L) 12.0 - 16.0 g/dL    Hematocrit 33.6 (L) 37.0 - 48.5 %    MCV 83 82 - 98 fL    MCH 27.6 27.0 - 31.0 pg    MCHC 33.3 32.0 - 36.0 %    RDW 14.8 (H) 11.5 - 14.5 %    Platelets 295 150 - 350 K/uL    MPV 10.8 9.2 - 12.9 fL    Gran # 7.3 1.8 - 7.7 K/uL    Lymph # 3.5 1.0 - 4.8 K/uL    Mono # 0.5 0.3 - 1.0 K/uL    Eos # 0.1 0.0 - 0.5 K/uL    Baso # 0.04 0.00 - 0.20 K/uL    Gran% 63.3 38.0 - 73.0 %    Lymph% 30.6 18.0 - 48.0 %    Mono% 4.2 4.0 - 15.0 %    Eosinophil% 1.2 0.0 - 8.0 %    Basophil% 0.3 0.0 - 1.9 %    Differential Method Automated    Comprehensive metabolic panel   Result Value Ref Range    Sodium 143 136 - 145 mmol/L    Potassium 3.6 3.5 - 5.1 mmol/L    Chloride 105 95 - 110 mmol/L    CO2 29 23 - 29 mmol/L    Glucose 122 (H) 70 - 110 mg/dL     BUN, Bld 12 8 - 23 mg/dL    Creatinine 0.9 0.5 - 1.4 mg/dL    Calcium 10.3 8.7 - 10.5 mg/dL    Total Protein 7.0 6.0 - 8.4 g/dL    Albumin 3.4 (L) 3.5 - 5.2 g/dL    Total Bilirubin 0.9 0.1 - 1.0 mg/dL    Alkaline Phosphatase 86 55 - 135 U/L    AST 22 10 - 40 U/L    ALT 19 10 - 44 U/L    Anion Gap 9 8 - 16 mmol/L    eGFR if African American >60.0 >60 mL/min/1.73 m^2    eGFR if non African American >60.0 >60 mL/min/1.73 m^2   Lipid panel   Result Value Ref Range    Cholesterol 137 120 - 199 mg/dL    Triglycerides 113 30 - 150 mg/dL    HDL 47 40 - 75 mg/dL    LDL Cholesterol 67.4 63.0 - 159.0 mg/dL    HDL/Chol Ratio 34.3 20.0 - 50.0 %    Total Cholesterol/HDL Ratio 2.9 2.0 - 5.0    Non-HDL Cholesterol 90 mg/dL          ASSESSMENT AND PLAN:  1. Routine medical exam  Counseled on age appropriate medical preventative services including age appropriate cancer screenings, age appropriate eye and dental exams, over all nutritional health, need for a consistent exercise regimen, and an over all push towards maintaining a vigorous and active lifestyle.  Counseled on age appropriate vaccines and discussed upcoming health care needs based on age/gender. Discussed good sleep hygiene and stress management.     2. Uncontrolled type 2 diabetes mellitus without complication, without long-term current use of insulin  Diabetes currently is under fair control. We discussed diabetic diet and regular exercise. We discussed home blood sugar monitoring, if appropriate. The current medical regimen is effective;  continue present plan and medications. A1c test pending.   - Microalbumin/creatinine urine ratio    3. Essential hypertension  Discussed sodium restriction, maintaining ideal body weight and regular exercise program as physiologic means to achieve blood pressure control. The patient will strive towards this. The current medical regimen is effective;  continue present plan and medications. Recommended patient to check home readings to  monitor and see me for followup as scheduled or sooner as needed. Patient was educated that both decongestant and anti-inflammatory medication may raise blood pressure.     4. Hyperlipidemia with target LDL less than 100  We discussed low fat diet and regular exercise.The current medical regimen is effective;  continue present plan and medications.      5. Osteopenia, unspecified location/6. Vitamin D deficiency disease  We discussed adequate calcium and vitamin D supplementation. She is up to date on her BMD.     7. Malignant neoplasm of lower-outer quadrant of right female breast  The current medical regimen is effective;  continue present plan and medications. Followed by oncology.    8. Sickle cell trait  Stable. Asymptomatic. Observe.     9. Hypothyroidism, postsurgical  Patient is clinically euthyroid. Continue current regimen. Followed by endocrinology. S/p thyroidectomy 12/2016 - healing well.    10. Severe obesity (BMI 35.0-35.9 with comorbidity)  The patient is asked to make an attempt to improve diet and exercise patterns to aid in medical management of this problem.           Return in about 6 months (around 10/3/2017), or if symptoms worsen or fail to improve, for follow up chronic medical conditions.. or sooner as needed.

## 2017-04-12 RX ORDER — LINAGLIPTIN 5 MG/1
TABLET, FILM COATED ORAL
Qty: 30 TABLET | Refills: 5 | Status: SHIPPED | OUTPATIENT
Start: 2017-04-12 | End: 2017-10-19 | Stop reason: SDUPTHER

## 2017-04-25 RX ORDER — METFORMIN HYDROCHLORIDE 1000 MG/1
TABLET ORAL
Qty: 180 TABLET | Refills: 0 | Status: SHIPPED | OUTPATIENT
Start: 2017-04-25 | End: 2017-07-17 | Stop reason: SDUPTHER

## 2017-05-11 DIAGNOSIS — I10 ESSENTIAL HYPERTENSION: ICD-10-CM

## 2017-05-11 DIAGNOSIS — E78.5 HYPERLIPIDEMIA WITH TARGET LDL LESS THAN 100: ICD-10-CM

## 2017-05-11 RX ORDER — HYDROCHLOROTHIAZIDE 25 MG/1
TABLET ORAL
Qty: 90 TABLET | Refills: 0 | Status: SHIPPED | OUTPATIENT
Start: 2017-05-11 | End: 2017-09-11 | Stop reason: SDUPTHER

## 2017-05-11 RX ORDER — GLIPIZIDE 10 MG/1
TABLET, FILM COATED, EXTENDED RELEASE ORAL
Qty: 180 TABLET | Refills: 0 | Status: SHIPPED | OUTPATIENT
Start: 2017-05-11 | End: 2017-08-16 | Stop reason: SDUPTHER

## 2017-05-12 RX ORDER — LOVASTATIN 20 MG/1
20 TABLET ORAL DAILY
Qty: 90 TABLET | Refills: 1 | Status: SHIPPED | OUTPATIENT
Start: 2017-05-12 | End: 2017-11-28 | Stop reason: SDUPTHER

## 2017-05-31 ENCOUNTER — OFFICE VISIT (OUTPATIENT)
Dept: OPTOMETRY | Facility: CLINIC | Age: 62
End: 2017-05-31
Payer: COMMERCIAL

## 2017-05-31 DIAGNOSIS — H25.13 NUCLEAR SCLEROSIS, BILATERAL: ICD-10-CM

## 2017-05-31 DIAGNOSIS — Z01.00 DIABETIC EYE EXAM: Primary | ICD-10-CM

## 2017-05-31 DIAGNOSIS — H52.00 HYPEROPIA WITH PRESBYOPIA, UNSPECIFIED LATERALITY: ICD-10-CM

## 2017-05-31 DIAGNOSIS — H52.4 HYPEROPIA WITH PRESBYOPIA, UNSPECIFIED LATERALITY: ICD-10-CM

## 2017-05-31 DIAGNOSIS — E11.9 DIABETIC EYE EXAM: Primary | ICD-10-CM

## 2017-05-31 PROCEDURE — 92015 DETERMINE REFRACTIVE STATE: CPT | Mod: S$GLB,,, | Performed by: OPTOMETRIST

## 2017-05-31 PROCEDURE — 99999 PR PBB SHADOW E&M-EST. PATIENT-LVL II: CPT | Mod: PBBFAC,,, | Performed by: OPTOMETRIST

## 2017-05-31 PROCEDURE — 92014 COMPRE OPH EXAM EST PT 1/>: CPT | Mod: S$GLB,,, | Performed by: OPTOMETRIST

## 2017-05-31 NOTE — PROGRESS NOTES
HPI     HPI     Pt here for Diabetic eye exam .  Last Blood Sugar - 115  Hemoglobin A1C       Date                     Value               Ref Range             Status                04/01/2017               7.1 (H)             4.5 - 6.2 %           Final                   09/26/2016               7.4 (H)             4.5 - 6.2 %           Final                 03/05/2016               7.2 (H)             4.5 - 6.2 %           Final            ----------    BETTE 2016   Ms. Flanagan is here today to continue ocular health care for diabetes.  Pt sts has not noticed much change with current gls x2-3 yrs   (-)Flashes (+)Floaters  (-)Itch, (-)tear, (-)burn, (+)Dryness.  (-) OTC Drops hx of visine use   (-)Photophobia  (-)Glare     Last edited by Cristina Mitchell, PCT on 5/31/2017  9:24 AM. (History)            Assessment /Plan     For exam results, see Encounter Report.    Diabetic eye exam  -No retinopathy noted today.  Continued control with primary care physician and annual comprehensive eye exam.    Nuclear sclerosis, bilateral  -Educated patient on presence of cataracts at today's exam, monitor at annual dilated fundus exam. 5+ years surgical estimate.    Hyperopia with presbyopia, unspecified laterality  Eyeglass Final Rx     Eyeglass Final Rx       Sphere Cylinder Axis Dist VA Add    Right +1.50 Sphere  20/25 +2.50    Left +1.75 +0.75 010 20/25 +2.50    Type:  PAL    Expiration Date:  6/1/2018                  RTC 1 yr

## 2017-06-08 DIAGNOSIS — I10 ESSENTIAL HYPERTENSION: ICD-10-CM

## 2017-06-08 RX ORDER — LOSARTAN POTASSIUM 25 MG/1
TABLET ORAL
Qty: 90 TABLET | Refills: 0 | Status: SHIPPED | OUTPATIENT
Start: 2017-06-08 | End: 2017-09-11 | Stop reason: SDUPTHER

## 2017-07-17 RX ORDER — METFORMIN HYDROCHLORIDE 1000 MG/1
TABLET ORAL
Qty: 180 TABLET | Refills: 0 | Status: SHIPPED | OUTPATIENT
Start: 2017-07-17 | End: 2017-11-02 | Stop reason: SDUPTHER

## 2017-08-16 RX ORDER — GLIPIZIDE 10 MG/1
TABLET, FILM COATED, EXTENDED RELEASE ORAL
Qty: 60 TABLET | Refills: 2 | Status: SHIPPED | OUTPATIENT
Start: 2017-08-16 | End: 2017-11-22 | Stop reason: SDUPTHER

## 2017-09-11 DIAGNOSIS — I10 ESSENTIAL HYPERTENSION: ICD-10-CM

## 2017-09-11 DIAGNOSIS — E78.5 HYPERLIPIDEMIA WITH TARGET LDL LESS THAN 100: Primary | ICD-10-CM

## 2017-09-11 RX ORDER — LOSARTAN POTASSIUM 25 MG/1
TABLET ORAL
Qty: 90 TABLET | Refills: 0 | Status: SHIPPED | OUTPATIENT
Start: 2017-09-11 | End: 2017-12-06 | Stop reason: SDUPTHER

## 2017-09-11 RX ORDER — HYDROCHLOROTHIAZIDE 25 MG/1
TABLET ORAL
Qty: 90 TABLET | Refills: 0 | Status: SHIPPED | OUTPATIENT
Start: 2017-09-11 | End: 2017-12-06 | Stop reason: SDUPTHER

## 2017-10-19 RX ORDER — LINAGLIPTIN 5 MG/1
TABLET, FILM COATED ORAL
Qty: 30 TABLET | Refills: 0 | Status: SHIPPED | OUTPATIENT
Start: 2017-10-19 | End: 2017-11-22 | Stop reason: SDUPTHER

## 2017-11-02 RX ORDER — METFORMIN HYDROCHLORIDE 1000 MG/1
TABLET ORAL
Qty: 60 TABLET | Refills: 0 | Status: SHIPPED | OUTPATIENT
Start: 2017-11-02 | End: 2017-12-06 | Stop reason: SDUPTHER

## 2017-11-22 RX ORDER — GLIPIZIDE 10 MG/1
TABLET, FILM COATED, EXTENDED RELEASE ORAL
Qty: 60 TABLET | Refills: 0 | Status: SHIPPED | OUTPATIENT
Start: 2017-11-22 | End: 2017-12-06 | Stop reason: SDUPTHER

## 2017-11-22 RX ORDER — LINAGLIPTIN 5 MG/1
TABLET, FILM COATED ORAL
Qty: 30 TABLET | Refills: 0 | Status: SHIPPED | OUTPATIENT
Start: 2017-11-22 | End: 2017-12-06 | Stop reason: SDUPTHER

## 2017-11-27 ENCOUNTER — LAB VISIT (OUTPATIENT)
Dept: LAB | Facility: HOSPITAL | Age: 62
End: 2017-11-27
Attending: INTERNAL MEDICINE
Payer: COMMERCIAL

## 2017-11-27 DIAGNOSIS — E89.0 POST-OPERATIVE HYPOTHYROIDISM: ICD-10-CM

## 2017-11-27 LAB
T4 FREE SERPL-MCNC: 1.44 NG/DL
TSH SERPL DL<=0.005 MIU/L-ACNC: 0.5 UIU/ML

## 2017-11-27 PROCEDURE — 84439 ASSAY OF FREE THYROXINE: CPT

## 2017-11-27 PROCEDURE — 36415 COLL VENOUS BLD VENIPUNCTURE: CPT | Mod: PO

## 2017-11-27 PROCEDURE — 84443 ASSAY THYROID STIM HORMONE: CPT

## 2017-11-28 DIAGNOSIS — E78.5 HYPERLIPIDEMIA WITH TARGET LDL LESS THAN 100: ICD-10-CM

## 2017-11-28 RX ORDER — LOVASTATIN 20 MG/1
20 TABLET ORAL DAILY
Qty: 90 TABLET | Refills: 1 | Status: SHIPPED | OUTPATIENT
Start: 2017-11-28 | End: 2017-12-06 | Stop reason: SDUPTHER

## 2017-12-02 ENCOUNTER — LAB VISIT (OUTPATIENT)
Dept: LAB | Facility: HOSPITAL | Age: 62
End: 2017-12-02
Payer: COMMERCIAL

## 2017-12-02 LAB
ESTIMATED AVG GLUCOSE: 148 MG/DL
HBA1C MFR BLD HPLC: 6.8 %

## 2017-12-02 PROCEDURE — 83036 HEMOGLOBIN GLYCOSYLATED A1C: CPT

## 2017-12-02 PROCEDURE — 36415 COLL VENOUS BLD VENIPUNCTURE: CPT | Mod: PO

## 2017-12-06 ENCOUNTER — OFFICE VISIT (OUTPATIENT)
Dept: FAMILY MEDICINE | Facility: CLINIC | Age: 62
End: 2017-12-06
Payer: COMMERCIAL

## 2017-12-06 VITALS
OXYGEN SATURATION: 97 % | SYSTOLIC BLOOD PRESSURE: 120 MMHG | TEMPERATURE: 98 F | HEIGHT: 67 IN | HEART RATE: 72 BPM | WEIGHT: 231.94 LBS | BODY MASS INDEX: 36.4 KG/M2 | DIASTOLIC BLOOD PRESSURE: 80 MMHG

## 2017-12-06 DIAGNOSIS — E78.5 HYPERLIPIDEMIA WITH TARGET LDL LESS THAN 100: ICD-10-CM

## 2017-12-06 DIAGNOSIS — M85.89 OSTEOPENIA OF MULTIPLE SITES: ICD-10-CM

## 2017-12-06 DIAGNOSIS — C50.511 MALIGNANT NEOPLASM OF LOWER-OUTER QUADRANT OF RIGHT FEMALE BREAST, UNSPECIFIED ESTROGEN RECEPTOR STATUS: ICD-10-CM

## 2017-12-06 DIAGNOSIS — E11.9 CONTROLLED TYPE 2 DIABETES MELLITUS WITHOUT COMPLICATION, WITHOUT LONG-TERM CURRENT USE OF INSULIN: Primary | ICD-10-CM

## 2017-12-06 DIAGNOSIS — I10 ESSENTIAL HYPERTENSION: ICD-10-CM

## 2017-12-06 DIAGNOSIS — Z23 NEED FOR SHINGLES VACCINE: ICD-10-CM

## 2017-12-06 DIAGNOSIS — Z23 NEED FOR PROPHYLACTIC VACCINATION AGAINST STREPTOCOCCUS PNEUMONIAE (PNEUMOCOCCUS): ICD-10-CM

## 2017-12-06 DIAGNOSIS — Z12.31 ENCOUNTER FOR SCREENING MAMMOGRAM FOR MALIGNANT NEOPLASM OF BREAST: ICD-10-CM

## 2017-12-06 DIAGNOSIS — E89.0 HYPOTHYROIDISM, POSTSURGICAL: ICD-10-CM

## 2017-12-06 DIAGNOSIS — Z23 FLU VACCINE NEED: ICD-10-CM

## 2017-12-06 DIAGNOSIS — E66.01 SEVERE OBESITY (BMI 35.0-35.9 WITH COMORBIDITY): ICD-10-CM

## 2017-12-06 PROCEDURE — 90732 PPSV23 VACC 2 YRS+ SUBQ/IM: CPT | Mod: S$GLB,,, | Performed by: INTERNAL MEDICINE

## 2017-12-06 PROCEDURE — 90472 IMMUNIZATION ADMIN EACH ADD: CPT | Mod: S$GLB,,, | Performed by: INTERNAL MEDICINE

## 2017-12-06 PROCEDURE — 90686 IIV4 VACC NO PRSV 0.5 ML IM: CPT | Mod: S$GLB,,, | Performed by: INTERNAL MEDICINE

## 2017-12-06 PROCEDURE — 99214 OFFICE O/P EST MOD 30 MIN: CPT | Mod: 25,S$GLB,, | Performed by: INTERNAL MEDICINE

## 2017-12-06 PROCEDURE — 90471 IMMUNIZATION ADMIN: CPT | Mod: S$GLB,,, | Performed by: INTERNAL MEDICINE

## 2017-12-06 PROCEDURE — 99999 PR PBB SHADOW E&M-EST. PATIENT-LVL IV: CPT | Mod: PBBFAC,,, | Performed by: INTERNAL MEDICINE

## 2017-12-06 RX ORDER — LOVASTATIN 20 MG/1
20 TABLET ORAL DAILY
Qty: 90 TABLET | Refills: 1 | Status: SHIPPED | OUTPATIENT
Start: 2017-12-06 | End: 2018-06-20 | Stop reason: SDUPTHER

## 2017-12-06 RX ORDER — LOSARTAN POTASSIUM 25 MG/1
25 TABLET ORAL DAILY
Qty: 90 TABLET | Refills: 1 | Status: SHIPPED | OUTPATIENT
Start: 2017-12-06 | End: 2018-06-20 | Stop reason: SDUPTHER

## 2017-12-06 RX ORDER — METFORMIN HYDROCHLORIDE 1000 MG/1
1000 TABLET ORAL 2 TIMES DAILY
Qty: 180 TABLET | Refills: 1 | Status: SHIPPED | OUTPATIENT
Start: 2017-12-06 | End: 2018-03-08 | Stop reason: SDUPTHER

## 2017-12-06 RX ORDER — GLIPIZIDE 10 MG/1
TABLET, FILM COATED, EXTENDED RELEASE ORAL
Qty: 180 TABLET | Refills: 1 | Status: SHIPPED | OUTPATIENT
Start: 2017-12-06 | End: 2018-03-06 | Stop reason: SDUPTHER

## 2017-12-06 RX ORDER — HYDROCHLOROTHIAZIDE 25 MG/1
25 TABLET ORAL DAILY
Qty: 90 TABLET | Refills: 1 | Status: SHIPPED | OUTPATIENT
Start: 2017-12-06 | End: 2018-06-20 | Stop reason: SDUPTHER

## 2017-12-06 NOTE — PROGRESS NOTES
HISTORY OF PRESENT ILLNESS:  Masha Garcia is a 62 y.o. female who presents to the clinic today for Hyperlipidemia (6 month f/u ) and Hypertension  .  The patient presents to clinic today for follow-up of her type 2 diabetes mellitus, hypertension, and hyperlipidemia.  She recently saw endocrinology for her postsurgical hypothyroidism.  Unfortunately, she is developing a slight keloid scar.  She is applying topical vitamin E oil.  She has been having some problems with diarrhea.  She has several bowel movements in the morning.  This may be related to her thyroid medication, but also possibly her metformin.  Recent A1c look great. The patient reports compliance with current medication- patient denies missing any  medication doses. The patient denies any problems with cardiac chest pain, dizziness, palpitations, orthopnea, or lower extremity edema.  She does report that the Tradjenta is quite expensive.  She has breast cancer and is currently on a room index.  She is seen every 3 months at the cancer center at [facility].  She is up-to-date on breast cancer surveillance.      PAST MEDICAL HISTORY:  Past Medical History:   Diagnosis Date    AR (allergic rhinitis)     Breast cancer 06/2013    right; followed by oncology, Dr. Oliveira; s/p lumpectomy and  radiation    History of colonic polyps     Hyperlipidemia LDL goal < 100     Hypertension     Lower extremity edema     Mild chronic anemia     Obesity     Osteopenia     Sickle cell trait     Thyroid nodule     Type II or unspecified type diabetes mellitus without mention of complication, uncontrolled     Vitamin D deficiency disease        PAST SURGICAL HISTORY:  Past Surgical History:   Procedure Laterality Date    benign right breast biopsy  2012    lumpectomy right breast  6/2013    right ankle fracture repair      TOTAL ABDOMINAL HYSTERECTOMY      TOTAL THYROIDECTOMY  12/28/2016       SOCIAL HISTORY:  Social History     Social History    Marital  status:      Spouse name: N/A    Number of children: 3    Years of education: N/A     Occupational History    [employer]      Social History Main Topics    Smoking status: Never Smoker    Smokeless tobacco: Never Used    Alcohol use No    Drug use: Unknown    Sexual activity: Not on file     Other Topics Concern    Not on file     Social History Narrative    No narrative on file       FAMILY HISTORY:  Family History   Problem Relation Age of Onset    Diabetes Mother     Hypertension Mother     Hypertension Father     Prostate cancer Father     Diabetes Brother     Cancer Neg Hx     Breast cancer Neg Hx     Colon cancer Neg Hx     Ovarian cancer Neg Hx     Amblyopia Neg Hx     Blindness Neg Hx     Cataracts Neg Hx     Glaucoma Neg Hx     Macular degeneration Neg Hx     Retinal detachment Neg Hx     Strabismus Neg Hx     Stroke Neg Hx     Thyroid disease Neg Hx        ALLERGIES AND MEDICATIONS: updated and reviewed.  Review of patient's allergies indicates:   Allergen Reactions    Ace inhibitors Other (See Comments)     cough    Penicillins Hives     Medication List with Changes/Refills   Current Medications    ALENDRONATE (FOSAMAX) 70 MG TABLET    Take 1 tablet (70 mg total) by mouth every 7 days.    ANASTROZOLE (ARIMIDEX) 1 MG TAB    Take 1 mg by mouth every morning.     CALCIUM CARBONATE-VITAMIN D3 (CALCIUM 600 + D,3,) 600 MG CALCIUM- 200 UNIT CAP    Take 1 tablet by mouth every morning.     LEVOTHYROXINE (SYNTHROID) 125 MCG TABLET    TAKE ONE TABLET BY MOUTH ONCE DAILY BEFORE  BREAKFAST    LORATADINE (CLARITIN) 10 MG TABLET    Take 10 mg by mouth daily as needed.     OXYCODONE-ACETAMINOPHEN (PERCOCET) 5-325 MG PER TABLET    Take 1 tablet by mouth every 4 (four) hours as needed for Pain.   Changed and/or Refilled Medications    Modified Medication Previous Medication    GLIPIZIDE (GLUCOTROL) 10 MG TR24 glipiZIDE (GLUCOTROL) 10 MG TR24       TAKE ONE TABLET BY MOUTH TWICE DAILY     TAKE ONE TABLET BY MOUTH TWICE DAILY    HYDROCHLOROTHIAZIDE (HYDRODIURIL) 25 MG TABLET hydrochlorothiazide (HYDRODIURIL) 25 MG tablet       Take 1 tablet (25 mg total) by mouth once daily.    TAKE ONE TABLET BY MOUTH ONCE DAILY    LINAGLIPTIN (TRADJENTA) 5 MG TAB TABLET TRADJENTA 5 mg Tab tablet       Take 1 tablet (5 mg total) by mouth once daily.    TAKE ONE TABLET BY MOUTH ONCE DAILY    LOSARTAN (COZAAR) 25 MG TABLET losartan (COZAAR) 25 MG tablet       Take 1 tablet (25 mg total) by mouth once daily.    TAKE ONE TABLET BY MOUTH ONCE DAILY    LOVASTATIN (MEVACOR) 20 MG TABLET lovastatin (MEVACOR) 20 MG tablet       Take 1 tablet (20 mg total) by mouth once daily.    Take 1 tablet (20 mg total) by mouth once daily.    METFORMIN (GLUCOPHAGE) 1000 MG TABLET metFORMIN (GLUCOPHAGE) 1000 MG tablet       Take 1 tablet (1,000 mg total) by mouth 2 (two) times daily.    TAKE ONE TABLET BY MOUTH TWICE DAILY          CARE TEAM:  Patient Care Team:  Brenda Bailey MD as PCP - General (Internal Medicine)  Eun Frdeerick MD as Consulting Physician (Oncology)  Elsy Dubose MD as Consulting Physician (Endocrinology)         REVIEW OF SYSTEMS:  Review of Systems   Constitutional: Negative for chills, fever, malaise/fatigue and weight loss.   HENT: Negative for congestion, ear pain, nosebleeds and sore throat.    Eyes: Negative for blurred vision, pain and discharge.   Respiratory: Negative for cough, hemoptysis, shortness of breath and wheezing.    Cardiovascular: Negative for chest pain, palpitations, claudication and leg swelling.   Gastrointestinal: Positive for diarrhea (see HPI). Negative for abdominal pain, blood in stool, constipation, heartburn, melena, nausea and vomiting.   Genitourinary: Negative for dysuria, frequency, hematuria and urgency.   Musculoskeletal: Negative for joint pain and myalgias.   Skin: Negative for itching and rash.   Neurological: Negative for dizziness, tremors, focal weakness,  "seizures, weakness and headaches.   Endo/Heme/Allergies: Negative for polydipsia. Does not bruise/bleed easily.   Psychiatric/Behavioral: Negative for depression, memory loss, substance abuse and suicidal ideas. The patient is not nervous/anxious and does not have insomnia.          PHYSICAL EXAM:   Vitals:    12/06/17 0845   BP: 120/80   Pulse: 72   Temp: 98.4 °F (36.9 °C)     Weight: 105.2 kg (231 lb 14.8 oz)   Height: 5' 7.01" (170.2 cm)   Body mass index is 36.32 kg/m².     General appearance - alert, well appearing, and in no distress and obese  Mental status - alert, oriented to person, place, and time, normal mood, behavior, speech, dress, motor activity, and thought processes  Eyes - pupils equal and reactive, extraocular eye movements intact, sclera anicteric  Mouth - mucous membranes moist, pharynx normal without lesions  Neck - supple, no significant adenopathy, carotids upstroke normal bilaterally, no bruits, thyroid exam: thyroidectomy scar noted with mild keloiding  Lymphatics - no palpable lymphadenopathy  Chest - clear to auscultation, no wheezes, rales or rhonchi, symmetric air entry  Heart - normal rate and regular rhythm, no gallops noted  Neurological - alert, oriented, normal speech, no focal findings or movement disorder noted, cranial nerves II through XII intact  Musculoskeletal - no muscular tenderness noted  Extremities - peripheral pulses normal, no pedal edema, no clubbing or cyanosis  Skin - normal coloration and turgor, no rashes, no suspicious skin lesions noted      Protective Sensation (w/ 10 gram monofilament):  Right: Intact  Left: Intact    Visual Inspection:  Normal -  Bilateral except mild dry skin noted on heels    Pedal Pulses:   Right: Present  Left: Present    Posterior tibialis:   Right:Present  Left: Present       Results for orders placed or performed in visit on 12/02/17   Hemoglobin A1c   Result Value Ref Range    Hemoglobin A1C 6.8 (H) 4.0 - 5.6 %    Estimated Avg " Glucose 148 (H) 68 - 131 mg/dL        ASSESSMENT AND PLAN:  1. Controlled type 2 diabetes mellitus without complication, without long-term current use of insulin  Diabetes currently is controlled. We discussed diabetic diet and regular exercise. We discussed home blood sugar monitoring, if appropriate. The current medical regimen is effective;  continue present plan and medications.    - Ambulatory Referral to Pharmacy Assistance - she states tradjenta is very expensive. Endocrinology discussed with her to possibly switch to insulin and then we could stop tradjenta (cost) and metformin (diarrhea)  - linagliptin (TRADJENTA) 5 mg Tab tablet; Take 1 tablet (5 mg total) by mouth once daily.  Dispense: 30 tablet; Refill: 5  - metFORMIN (GLUCOPHAGE) 1000 MG tablet; Take 1 tablet (1,000 mg total) by mouth 2 (two) times daily.  Dispense: 180 tablet; Refill: 1  - glipiZIDE (GLUCOTROL) 10 MG TR24; TAKE ONE TABLET BY MOUTH TWICE DAILY  Dispense: 180 tablet; Refill: 1    2. Essential hypertension  Discussed sodium restriction, maintaining ideal body weight and regular exercise program as physiologic means to achieve blood pressure control. The patient will strive towards this. The current medical regimen is effective;  continue present plan and medications. Recommended patient to check home readings to monitor and see me for followup as scheduled or sooner as needed. Patient was educated that both decongestant and anti-inflammatory medication may raise blood pressure.   - losartan (COZAAR) 25 MG tablet; Take 1 tablet (25 mg total) by mouth once daily.  Dispense: 90 tablet; Refill: 1  - hydroCHLOROthiazide (HYDRODIURIL) 25 MG tablet; Take 1 tablet (25 mg total) by mouth once daily.  Dispense: 90 tablet; Refill: 1    3. Hyperlipidemia with target LDL less than 100  We discussed low fat diet and regular exercise.The current medical regimen is effective;  continue present plan and medications.    - lovastatin (MEVACOR) 20 MG  tablet; Take 1 tablet (20 mg total) by mouth once daily.  Dispense: 90 tablet; Refill: 1    4. Hypothyroidism, postsurgical  Patient is clinically euthyroid. Continue current regimen.     5. Malignant neoplasm of lower-outer quadrant of right female breast, unspecified estrogen receptor status  The current medical regimen is effective;  continue present plan and medications. Followed by oncology.    6. Severe obesity (BMI 35.0-35.9 with comorbidity)  The patient is asked to make an attempt to improve diet and exercise patterns to aid in medical management of this problem.     7. Flu vaccine need/8. Need for prophylactic vaccination against Streptococcus pneumoniae (pneumococcus)  - Pneumococcal Polysaccharide Vaccine (23 Valent) (SQ/IM)  - Influenza - Quadrivalent (3 years & older) (PF)    9. Need for shingles vaccine  Patient was advised to get immunization at the pharmacy.     10. Encounter for screening mammogram for malignant neoplasm of breast  Patient reports she is up to date - we will get a copy for our records.    11. Osteopenia of multiple sites  She is scheduled for BMD with GYN next week. Continue fosamax as per endocrinology.           Return in about 6 months (around 6/6/2018), or if symptoms worsen or fail to improve, for annual exam. or sooner as needed.

## 2017-12-06 NOTE — PROGRESS NOTES
Influenza and Pneumonia-23 vaccine administered, jagruti well. Instructed to wait 15mins for observation, no reaction noted @ time of discharge.

## 2017-12-11 ENCOUNTER — TELEPHONE (OUTPATIENT)
Dept: PULMONOLOGY | Facility: CLINIC | Age: 62
End: 2017-12-11

## 2018-01-22 DIAGNOSIS — C50.919 MALIGNANT NEOPLASM OF BREAST IN FEMALE, ESTROGEN RECEPTOR POSITIVE, UNSPECIFIED LATERALITY, UNSPECIFIED SITE OF BREAST: Primary | ICD-10-CM

## 2018-01-22 DIAGNOSIS — Z17.0 MALIGNANT NEOPLASM OF BREAST IN FEMALE, ESTROGEN RECEPTOR POSITIVE, UNSPECIFIED LATERALITY, UNSPECIFIED SITE OF BREAST: Primary | ICD-10-CM

## 2018-01-23 ENCOUNTER — LAB VISIT (OUTPATIENT)
Dept: LAB | Facility: HOSPITAL | Age: 63
End: 2018-01-23
Attending: INTERNAL MEDICINE
Payer: COMMERCIAL

## 2018-01-23 DIAGNOSIS — Z17.0 MALIGNANT NEOPLASM OF BREAST IN FEMALE, ESTROGEN RECEPTOR POSITIVE, UNSPECIFIED LATERALITY, UNSPECIFIED SITE OF BREAST: ICD-10-CM

## 2018-01-23 DIAGNOSIS — C50.919 MALIGNANT NEOPLASM OF BREAST IN FEMALE, ESTROGEN RECEPTOR POSITIVE, UNSPECIFIED LATERALITY, UNSPECIFIED SITE OF BREAST: ICD-10-CM

## 2018-01-23 LAB
ALBUMIN SERPL BCP-MCNC: 3.4 G/DL
ALP SERPL-CCNC: 76 U/L
ALT SERPL W/O P-5'-P-CCNC: 19 U/L
ANION GAP SERPL CALC-SCNC: 11 MMOL/L
AST SERPL-CCNC: 22 U/L
BASOPHILS # BLD AUTO: 0.02 K/UL
BASOPHILS NFR BLD: 0.2 %
BILIRUB SERPL-MCNC: 0.5 MG/DL
BUN SERPL-MCNC: 11 MG/DL
CALCIUM SERPL-MCNC: 9.4 MG/DL
CHLORIDE SERPL-SCNC: 104 MMOL/L
CO2 SERPL-SCNC: 27 MMOL/L
CREAT SERPL-MCNC: 0.8 MG/DL
DIFFERENTIAL METHOD: ABNORMAL
EOSINOPHIL # BLD AUTO: 0.1 K/UL
EOSINOPHIL NFR BLD: 0.8 %
ERYTHROCYTE [DISTWIDTH] IN BLOOD BY AUTOMATED COUNT: 15 %
EST. GFR  (AFRICAN AMERICAN): >60 ML/MIN/1.73 M^2
EST. GFR  (NON AFRICAN AMERICAN): >60 ML/MIN/1.73 M^2
GLUCOSE SERPL-MCNC: 153 MG/DL
HCT VFR BLD AUTO: 33.1 %
HGB BLD-MCNC: 11.3 G/DL
LYMPHOCYTES # BLD AUTO: 3.1 K/UL
LYMPHOCYTES NFR BLD: 25.7 %
MCH RBC QN AUTO: 28.2 PG
MCHC RBC AUTO-ENTMCNC: 34.1 G/DL
MCV RBC AUTO: 83 FL
MONOCYTES # BLD AUTO: 0.5 K/UL
MONOCYTES NFR BLD: 3.7 %
NEUTROPHILS # BLD AUTO: 8.4 K/UL
NEUTROPHILS NFR BLD: 69.6 %
PLATELET # BLD AUTO: 308 K/UL
PMV BLD AUTO: 10.6 FL
POTASSIUM SERPL-SCNC: 3.6 MMOL/L
PROT SERPL-MCNC: 7.6 G/DL
RBC # BLD AUTO: 4.01 M/UL
SODIUM SERPL-SCNC: 142 MMOL/L
WBC # BLD AUTO: 12.04 K/UL

## 2018-01-23 PROCEDURE — 85025 COMPLETE CBC W/AUTO DIFF WBC: CPT | Mod: PO

## 2018-01-23 PROCEDURE — 80053 COMPREHEN METABOLIC PANEL: CPT

## 2018-01-25 ENCOUNTER — INITIAL CONSULT (OUTPATIENT)
Dept: HEMATOLOGY/ONCOLOGY | Facility: CLINIC | Age: 63
End: 2018-01-25
Payer: COMMERCIAL

## 2018-01-25 VITALS
HEIGHT: 67 IN | OXYGEN SATURATION: 98 % | BODY MASS INDEX: 35.62 KG/M2 | WEIGHT: 226.94 LBS | TEMPERATURE: 98 F | DIASTOLIC BLOOD PRESSURE: 67 MMHG | SYSTOLIC BLOOD PRESSURE: 129 MMHG | HEART RATE: 77 BPM

## 2018-01-25 DIAGNOSIS — C50.911 MALIGNANT NEOPLASM OF RIGHT BREAST IN FEMALE, ESTROGEN RECEPTOR POSITIVE, UNSPECIFIED SITE OF BREAST: Primary | ICD-10-CM

## 2018-01-25 DIAGNOSIS — Z17.0 MALIGNANT NEOPLASM OF RIGHT BREAST IN FEMALE, ESTROGEN RECEPTOR POSITIVE, UNSPECIFIED SITE OF BREAST: Primary | ICD-10-CM

## 2018-01-25 PROCEDURE — 99213 OFFICE O/P EST LOW 20 MIN: CPT | Mod: S$GLB,,, | Performed by: INTERNAL MEDICINE

## 2018-01-25 PROCEDURE — 99999 PR PBB SHADOW E&M-EST. PATIENT-LVL III: CPT | Mod: PBBFAC,,, | Performed by: INTERNAL MEDICINE

## 2018-01-25 NOTE — PROGRESS NOTES
Chief Complaint :   Breast Cancer    Hx of Present illness :  Patient is a 62 y.o. year old female who presents to the clinic today for oncology followup. dx'd to have Carcinoma Right Breast  In 2013. Lumpectomy and radiation therapy followed by adjuvant Arimidex therapy. Breast cancer Index did not show  Any benefit with extended harmonal therapy. Will complete five years of Arimidex in 2018. Feels well. In good spirits. Mild hot flashes; no vaginal bleeding or discharge. Mild pain knees at night. No overt bleeding. No fever, cough, dyspnea      Allergies :    Review of patient's allergies indicates:   Allergen Reactions    Ace inhibitors Other (See Comments)     cough    Penicillins Hives       Occupation : retired .    Transfusion :  None    Menstrual & obstetric Hx :  3; para 3.  Age of menarche: 12  Age of first pregnancy: 18  Lactation history: None  Age of menopause:  Hysterectomy in her late 40's for bleeding  HRT:  For many years.    Present Meds :   Medication List with Changes/Refills   Current Medications    ALENDRONATE (FOSAMAX) 70 MG TABLET    Take 1 tablet (70 mg total) by mouth every 7 days.    ANASTROZOLE (ARIMIDEX) 1 MG TAB    Take 1 mg by mouth every morning.     CALCIUM CARBONATE-VITAMIN D3 (CALCIUM 600 + D,3,) 600 MG CALCIUM- 200 UNIT CAP    Take 1 tablet by mouth every morning.     GLIPIZIDE (GLUCOTROL) 10 MG TR24    TAKE ONE TABLET BY MOUTH TWICE DAILY    HYDROCHLOROTHIAZIDE (HYDRODIURIL) 25 MG TABLET    Take 1 tablet (25 mg total) by mouth once daily.    LEVOTHYROXINE (SYNTHROID) 125 MCG TABLET    TAKE ONE TABLET BY MOUTH ONCE DAILY BEFORE  BREAKFAST    LINAGLIPTIN (TRADJENTA) 5 MG TAB TABLET    Take 1 tablet (5 mg total) by mouth once daily.    LORATADINE (CLARITIN) 10 MG TABLET    Take 10 mg by mouth daily as needed.     LOSARTAN (COZAAR) 25 MG TABLET    Take 1 tablet (25 mg total) by mouth once daily.    LOVASTATIN (MEVACOR) 20 MG TABLET    Take 1 tablet (20 mg total)  by mouth once daily.    METFORMIN (GLUCOPHAGE) 1000 MG TABLET    Take 1 tablet (1,000 mg total) by mouth 2 (two) times daily.    OXYCODONE-ACETAMINOPHEN (PERCOCET) 5-325 MG PER TABLET    Take 1 tablet by mouth every 4 (four) hours as needed for Pain.       Past Medical Hx :  Carcinoma right breast. Diabetes Mellitus type 2; Hypertension; hyperlipidemia; Allergic rhinitis; osteopenia; sickle cell trait; Thyroid nodule. No Hx of hepatitis, TB, Sarcoid, Lupus, rheumatoid arthritis, seizure disorder, CVA, PE or DVT. No prior Hx of Chemotherapy    Past Medical Hx :  Past Medical History:   Diagnosis Date    AR (allergic rhinitis)     Breast cancer 06/2013    right; followed by oncology, Dr. Oliveira; s/p lumpectomy and  radiation    History of colonic polyps     Hyperlipidemia LDL goal < 100     Hypertension     Lower extremity edema     Mild chronic anemia     Obesity     Osteopenia     Sickle cell trait     Thyroid nodule     Type II or unspecified type diabetes mellitus without mention of complication, uncontrolled     Vitamin D deficiency disease        Travel Hx :  N/A    Immunization :  Immunization History   Administered Date(s) Administered    Influenza - Quadrivalent - PF 09/28/2016, 12/06/2017    Influenza Split 11/26/2012, 01/10/2014    Pneumococcal Conjugate - 13 Valent 05/04/2016    Pneumococcal Polysaccharide - 23 Valent 08/13/2008, 12/06/2017    Tdap 07/24/2009    influenza - Quadrivalent 10/08/2014       Family Hx : reviewed  Family History   Problem Relation Age of Onset    Diabetes Mother     Hypertension Mother     Hypertension Father     Prostate cancer Father     Diabetes Brother     Cancer Neg Hx     Breast cancer Neg Hx     Colon cancer Neg Hx     Ovarian cancer Neg Hx     Amblyopia Neg Hx     Blindness Neg Hx     Cataracts Neg Hx     Glaucoma Neg Hx     Macular degeneration Neg Hx     Retinal detachment Neg Hx     Strabismus Neg Hx     Stroke Neg Hx     Thyroid  disease Neg Hx        Social Hx : Reviewed  Social History     Social History    Marital status:      Spouse name: N/A    Number of children: 3    Years of education: N/A     Occupational History    [employer]      Social History Main Topics    Smoking status: Never Smoker    Smokeless tobacco: Never Used    Alcohol use No    Drug use: Unknown    Sexual activity: Not on file     Other Topics Concern    Not on file     Social History Narrative    No narrative on file       Surgery :  Right Breast Bx; Lumpectomy Right; Tubal ligation; Complete Hysterectomy; Thyroidectomy.  Colonoscopy about 5 years ago.    Symptoms :    Review of Systems   Constitutional: Negative for chills, diaphoresis, fever, malaise/fatigue and weight loss.   HENT: Positive for congestion. Negative for ear discharge, ear pain, hearing loss, nosebleeds, sinus pain, sore throat and tinnitus.    Eyes: Negative for blurred vision, double vision, photophobia, pain, discharge and redness.   Respiratory: Negative for cough, hemoptysis, sputum production, shortness of breath, wheezing and stridor.    Cardiovascular: Negative for chest pain, palpitations, orthopnea, claudication, leg swelling and PND.   Gastrointestinal: Positive for diarrhea (Mild). Negative for abdominal pain, blood in stool, constipation, heartburn, nausea and vomiting.   Genitourinary: Negative for dysuria, flank pain, frequency, hematuria and urgency.   Musculoskeletal: Positive for joint pain. Negative for back pain, falls, myalgias and neck pain.   Skin: Negative for itching and rash.   Neurological: Negative for dizziness, tingling, tremors, sensory change, speech change, focal weakness, seizures, loss of consciousness, weakness and headaches.   Endo/Heme/Allergies: Negative for polydipsia. Does not bruise/bleed easily.   Psychiatric/Behavioral: Negative for depression, hallucinations, memory loss, substance abuse and suicidal ideas. The patient is not nervous/anxious  and does not have insomnia.        Physical Exam :  Nurse Ana present in the room  Physical Exam   Constitutional: She is oriented to person, place, and time and well-developed, well-nourished, and in no distress. No distress.   HENT:   Head: Normocephalic and atraumatic.   Right Ear: External ear normal.   Left Ear: External ear normal.   Nose: Nose normal.   Mouth/Throat: Oropharynx is clear and moist. No oropharyngeal exudate.   Eyes: Conjunctivae and EOM are normal. Pupils are equal, round, and reactive to light. Right eye exhibits no discharge. Left eye exhibits no discharge. No scleral icterus.   Neck: Normal range of motion and full passive range of motion without pain. Neck supple.       Cardiovascular: Normal rate, regular rhythm, S1 normal, normal heart sounds, intact distal pulses and normal pulses.  PMI is not displaced.    Pulmonary/Chest: Breath sounds normal. No apnea. Right breast exhibits no inverted nipple, no mass, no nipple discharge, no skin change and no tenderness. Left breast exhibits no inverted nipple, no mass, no nipple discharge, no skin change and no tenderness.       Abdominal: Soft. Normal appearance, normal aorta and bowel sounds are normal. She exhibits no distension, no ascites and no mass. There is no hepatosplenomegaly, splenomegaly or hepatomegaly. There is no tenderness. There is no rebound and no CVA tenderness. No hernia.   Genitourinary:   Genitourinary Comments: Not Examined   Musculoskeletal: Normal range of motion.   Lymphadenopathy:        Head (right side): No submental, no submandibular, no tonsillar, no preauricular, no posterior auricular and no occipital adenopathy present.        Head (left side): No submental, no submandibular, no tonsillar, no preauricular, no posterior auricular and no occipital adenopathy present.     She has no cervical adenopathy.     She has no axillary adenopathy.        Right: No inguinal, no supraclavicular and no epitrochlear  adenopathy present.        Left: No inguinal, no supraclavicular and no epitrochlear adenopathy present.   Neurological: She is alert and oriented to person, place, and time. She has normal motor skills, normal sensation, normal strength, normal reflexes and intact cranial nerves. Gait normal. GCS score is 15.   Skin: Skin is warm, dry and intact. No rash noted. She is not diaphoretic. No cyanosis. No pallor. Nails show no clubbing.   Psychiatric: Mood, memory, affect and judgment normal.         Labs & Imaging :  1/23/18 : Hgb 11.3; Hct 33.1; Plts 305,000 ANC 8,400; nT4 1.09; TSH 5.4.  NFBS 153. ALP 76. Cr.0.8 Bili 0.3        Dx :  Carcinoma Right breast in remission.        Assessment & Plan:  Reviewed with patient.  had done Bone Density studies in Dec 2017. Continue Arimidex and Fosamax. Endocrine followup with Dr.Amy shultz. Routine Medical followup with . Oncology followup in 3 months  **

## 2018-03-06 DIAGNOSIS — I10 ESSENTIAL HYPERTENSION: Primary | ICD-10-CM

## 2018-03-06 DIAGNOSIS — E78.5 HYPERLIPIDEMIA WITH TARGET LDL LESS THAN 100: ICD-10-CM

## 2018-03-06 DIAGNOSIS — E11.9 CONTROLLED TYPE 2 DIABETES MELLITUS WITHOUT COMPLICATION, WITHOUT LONG-TERM CURRENT USE OF INSULIN: ICD-10-CM

## 2018-03-06 RX ORDER — GLIPIZIDE 10 MG/1
TABLET, FILM COATED, EXTENDED RELEASE ORAL
Qty: 180 TABLET | Refills: 0 | Status: SHIPPED | OUTPATIENT
Start: 2018-03-06 | End: 2018-06-20 | Stop reason: SDUPTHER

## 2018-03-06 NOTE — TELEPHONE ENCOUNTER
Refill request. OV 5/20/18, requesting lab orders. Patient states she will be having her mammogram done at .

## 2018-03-08 DIAGNOSIS — E11.9 CONTROLLED TYPE 2 DIABETES MELLITUS WITHOUT COMPLICATION, WITHOUT LONG-TERM CURRENT USE OF INSULIN: ICD-10-CM

## 2018-03-08 RX ORDER — METFORMIN HYDROCHLORIDE 1000 MG/1
1000 TABLET ORAL 2 TIMES DAILY
Qty: 180 TABLET | Refills: 0 | Status: SHIPPED | OUTPATIENT
Start: 2018-03-08 | End: 2018-06-20 | Stop reason: SDUPTHER

## 2018-04-23 ENCOUNTER — LAB VISIT (OUTPATIENT)
Dept: LAB | Facility: HOSPITAL | Age: 63
End: 2018-04-23
Attending: INTERNAL MEDICINE
Payer: MEDICARE

## 2018-04-23 DIAGNOSIS — C50.911 MALIGNANT NEOPLASM OF RIGHT BREAST IN FEMALE, ESTROGEN RECEPTOR POSITIVE, UNSPECIFIED SITE OF BREAST: ICD-10-CM

## 2018-04-23 DIAGNOSIS — Z17.0 MALIGNANT NEOPLASM OF RIGHT BREAST IN FEMALE, ESTROGEN RECEPTOR POSITIVE, UNSPECIFIED SITE OF BREAST: ICD-10-CM

## 2018-04-23 LAB
ALBUMIN SERPL BCP-MCNC: 3.3 G/DL
ALP SERPL-CCNC: 67 U/L
ALT SERPL W/O P-5'-P-CCNC: 16 U/L
ANION GAP SERPL CALC-SCNC: 10 MMOL/L
AST SERPL-CCNC: 19 U/L
BASOPHILS # BLD AUTO: 0.04 K/UL
BASOPHILS NFR BLD: 0.4 %
BILIRUB SERPL-MCNC: 0.6 MG/DL
BUN SERPL-MCNC: 10 MG/DL
CALCIUM SERPL-MCNC: 9.7 MG/DL
CHLORIDE SERPL-SCNC: 106 MMOL/L
CO2 SERPL-SCNC: 27 MMOL/L
CREAT SERPL-MCNC: 0.7 MG/DL
DIFFERENTIAL METHOD: ABNORMAL
EOSINOPHIL # BLD AUTO: 0.1 K/UL
EOSINOPHIL NFR BLD: 0.7 %
ERYTHROCYTE [DISTWIDTH] IN BLOOD BY AUTOMATED COUNT: 13.9 %
EST. GFR  (AFRICAN AMERICAN): >60 ML/MIN/1.73 M^2
EST. GFR  (NON AFRICAN AMERICAN): >60 ML/MIN/1.73 M^2
GLUCOSE SERPL-MCNC: 90 MG/DL
HCT VFR BLD AUTO: 32.7 %
HGB BLD-MCNC: 11.2 G/DL
LYMPHOCYTES # BLD AUTO: 3.7 K/UL
LYMPHOCYTES NFR BLD: 32.2 %
MCH RBC QN AUTO: 28.5 PG
MCHC RBC AUTO-ENTMCNC: 34.3 G/DL
MCV RBC AUTO: 83 FL
MONOCYTES # BLD AUTO: 0.6 K/UL
MONOCYTES NFR BLD: 4.9 %
NEUTROPHILS # BLD AUTO: 7 K/UL
NEUTROPHILS NFR BLD: 61.8 %
PLATELET # BLD AUTO: 286 K/UL
PMV BLD AUTO: 10.2 FL
POTASSIUM SERPL-SCNC: 3.6 MMOL/L
PROT SERPL-MCNC: 7.3 G/DL
RBC # BLD AUTO: 3.93 M/UL
SODIUM SERPL-SCNC: 143 MMOL/L
WBC # BLD AUTO: 11.33 K/UL

## 2018-04-23 PROCEDURE — 80053 COMPREHEN METABOLIC PANEL: CPT

## 2018-04-23 PROCEDURE — 85025 COMPLETE CBC W/AUTO DIFF WBC: CPT | Mod: PO

## 2018-04-23 PROCEDURE — 36415 COLL VENOUS BLD VENIPUNCTURE: CPT | Mod: PO

## 2018-04-24 NOTE — PROGRESS NOTES
Chief Complaint :   Breast Cancer    Hx of Present illness :  Patient is a 63 y.o. year old female who presents to the clinic today for oncology followup. dx'd to have Carcinoma Right Breast  In 2013. Lumpectomy and radiation therapy followed by adjuvant Arimidex therapy. Breast cancer Index did not show  Any benefit with extended harmonal therapy. Will complete five years of Arimidex in 2018. Feels well. In good spirits. Mild hot flashes; no vaginal bleeding or discharge. Mild pain knees at night. No overt bleeding. No fever, cough, dyspnea . Appetite good. No Urinary sx. Mild diarrhoea.  Persistent sinus drip.      Allergies :    Review of patient's allergies indicates:   Allergen Reactions    Ace inhibitors Other (See Comments)     cough    Penicillins Hives       Occupation : retired .    Transfusion :  None    Menstrual & obstetric Hx :  3; para 3.  Age of menarche: 12  Age of first pregnancy: 18  Lactation history: None  Age of menopause:  Hysterectomy in her late 40's for bleeding  HRT:  For many years.    Present Meds :   Medication List with Changes/Refills   Current Medications    ALENDRONATE (FOSAMAX) 70 MG TABLET    Take 1 tablet (70 mg total) by mouth every 7 days.    ANASTROZOLE (ARIMIDEX) 1 MG TAB    Take 1 mg by mouth every morning.     CALCIUM CARBONATE-VITAMIN D3 (CALCIUM 600 + D,3,) 600 MG CALCIUM- 200 UNIT CAP    Take 1 tablet by mouth every morning.     GLIPIZIDE (GLUCOTROL) 10 MG TR24    TAKE ONE TABLET BY MOUTH TWICE DAILY    HYDROCHLOROTHIAZIDE (HYDRODIURIL) 25 MG TABLET    Take 1 tablet (25 mg total) by mouth once daily.    LEVOTHYROXINE (SYNTHROID) 125 MCG TABLET    TAKE ONE TABLET BY MOUTH ONCE DAILY BEFORE  BREAKFAST    LINAGLIPTIN (TRADJENTA) 5 MG TAB TABLET    Take 1 tablet (5 mg total) by mouth once daily.    LORATADINE (CLARITIN) 10 MG TABLET    Take 10 mg by mouth daily as needed.     LOSARTAN (COZAAR) 25 MG TABLET    Take 1 tablet (25 mg total) by mouth once  daily.    LOVASTATIN (MEVACOR) 20 MG TABLET    Take 1 tablet (20 mg total) by mouth once daily.    METFORMIN (GLUCOPHAGE) 1000 MG TABLET    Take 1 tablet (1,000 mg total) by mouth 2 (two) times daily.    OXYCODONE-ACETAMINOPHEN (PERCOCET) 5-325 MG PER TABLET    Take 1 tablet by mouth every 4 (four) hours as needed for Pain.       Past Medical Hx :  Carcinoma right breast. Diabetes Mellitus type 2; Hypertension; hyperlipidemia; Allergic rhinitis; osteopenia; sickle cell trait; Thyroid nodule. No Hx of hepatitis, TB, Sarcoid, Lupus, rheumatoid arthritis, seizure disorder, CVA, PE or DVT. No prior Hx of Chemotherapy    Past Medical Hx :  Past Medical History:   Diagnosis Date    AR (allergic rhinitis)     Breast cancer 06/2013    right; followed by oncology, Dr. Oliveira; s/p lumpectomy and  radiation    History of colonic polyps     Hyperlipidemia LDL goal < 100     Hypertension     Lower extremity edema     Mild chronic anemia     Obesity     Osteopenia     Sickle cell trait     Thyroid nodule     Type II or unspecified type diabetes mellitus without mention of complication, uncontrolled     Vitamin D deficiency disease        Travel Hx :  N/A    Immunization :  Immunization History   Administered Date(s) Administered    Influenza - Quadrivalent - PF 09/28/2016, 12/06/2017    Influenza Split 11/26/2012, 01/10/2014    Pneumococcal Conjugate - 13 Valent 05/04/2016    Pneumococcal Polysaccharide - 23 Valent 08/13/2008, 12/06/2017    Tdap 07/24/2009    influenza - Quadrivalent 10/08/2014       Family Hx : reviewed  Family History   Problem Relation Age of Onset    Diabetes Mother     Hypertension Mother     Hypertension Father     Prostate cancer Father     Diabetes Brother     Cancer Neg Hx     Breast cancer Neg Hx     Colon cancer Neg Hx     Ovarian cancer Neg Hx     Amblyopia Neg Hx     Blindness Neg Hx     Cataracts Neg Hx     Glaucoma Neg Hx     Macular degeneration Neg Hx     Retinal  detachment Neg Hx     Strabismus Neg Hx     Stroke Neg Hx     Thyroid disease Neg Hx        Social Hx : Reviewed  Social History     Social History    Marital status:      Spouse name: N/A    Number of children: 3    Years of education: N/A     Occupational History    [employer]      Social History Main Topics    Smoking status: Never Smoker    Smokeless tobacco: Never Used    Alcohol use No    Drug use: Unknown    Sexual activity: Not on file     Other Topics Concern    Not on file     Social History Narrative    No narrative on file       Surgery :  Right Breast Bx; Lumpectomy Right; Tubal ligation; Complete Hysterectomy; Thyroidectomy.  Colonoscopy in 2014.    Symptoms :    Review of Systems   Constitutional: Negative for chills, diaphoresis, fever, malaise/fatigue and weight loss.   HENT: Positive for congestion. Negative for ear discharge, ear pain, hearing loss, nosebleeds, sinus pain, sore throat and tinnitus.    Eyes: Negative for blurred vision, double vision, photophobia, pain, discharge and redness.   Respiratory: Negative for cough, hemoptysis, sputum production, shortness of breath, wheezing and stridor.    Cardiovascular: Negative for chest pain, palpitations, orthopnea, claudication, leg swelling and PND.   Gastrointestinal: Positive for diarrhea (Mild). Negative for abdominal pain, blood in stool, constipation, heartburn, nausea and vomiting.   Genitourinary: Negative for dysuria, flank pain, frequency, hematuria and urgency.   Musculoskeletal: Positive for joint pain. Negative for back pain, falls, myalgias and neck pain.   Skin: Negative for itching and rash.   Neurological: Negative for dizziness, tingling, tremors, sensory change, speech change, focal weakness, seizures, loss of consciousness, weakness and headaches.   Endo/Heme/Allergies: Negative for polydipsia. Does not bruise/bleed easily.   Psychiatric/Behavioral: Negative for depression, hallucinations, memory loss,  substance abuse and suicidal ideas. The patient is not nervous/anxious and does not have insomnia.        Physical Exam :  Nurse Ana present in the room  Physical Exam   Constitutional: She is oriented to person, place, and time and well-developed, well-nourished, and in no distress. No distress.   HENT:   Head: Normocephalic and atraumatic.   Right Ear: External ear normal.   Left Ear: External ear normal.   Nose: Nose normal.   Mouth/Throat: Oropharynx is clear and moist. No oropharyngeal exudate.   Eyes: Conjunctivae and EOM are normal. Pupils are equal, round, and reactive to light. Right eye exhibits no discharge. Left eye exhibits no discharge. No scleral icterus.   Neck: Normal range of motion and full passive range of motion without pain. Neck supple.       Cardiovascular: Normal rate, regular rhythm, S1 normal, normal heart sounds, intact distal pulses and normal pulses.  PMI is not displaced.    Pulmonary/Chest: Breath sounds normal. No apnea. Right breast exhibits no inverted nipple, no mass, no nipple discharge, no skin change and no tenderness. Left breast exhibits no inverted nipple, no mass, no nipple discharge, no skin change and no tenderness.       Abdominal: Soft. Normal appearance, normal aorta and bowel sounds are normal. She exhibits no distension, no ascites and no mass. There is no hepatosplenomegaly, splenomegaly or hepatomegaly. There is no tenderness. There is no rebound and no CVA tenderness. No hernia.   Genitourinary:   Genitourinary Comments: Not Examined   Musculoskeletal: Normal range of motion.   Lymphadenopathy:        Head (right side): No submental, no submandibular, no tonsillar, no preauricular, no posterior auricular and no occipital adenopathy present.        Head (left side): No submental, no submandibular, no tonsillar, no preauricular, no posterior auricular and no occipital adenopathy present.     She has no cervical adenopathy.     She has no axillary adenopathy.         Right: No inguinal, no supraclavicular and no epitrochlear adenopathy present.        Left: No inguinal, no supraclavicular and no epitrochlear adenopathy present.   Neurological: She is alert and oriented to person, place, and time. She has normal motor skills, normal sensation, normal strength, normal reflexes and intact cranial nerves. Gait normal. GCS score is 15.   Skin: Skin is warm, dry and intact. No rash noted. She is not diaphoretic. No cyanosis. No pallor. Nails show no clubbing.   Psychiatric: Mood, memory, affect and judgment normal.         Labs & Imaging :  04/23/18 : NFBS 90; Cr. 0.7 Bili 0.2 Ca 9.3 ALP 71. Hgb 10; Hct 32.4. Plts 276,000 ANC 4,900        Dx :  Carcinoma Right breast in remission.        Assessment & Plan:  Reviewed with patient.  had done Bone Density studies in Dec 2017. Continue Arimidex and Fosamax. OTC Iron One daily.  Endocrine followup with Dr.Amy shultz. Routine Medical followup with . GI followup with   Oncology followup in 3 months with CBC,CMP,Iron Study.

## 2018-04-25 ENCOUNTER — OFFICE VISIT (OUTPATIENT)
Dept: HEMATOLOGY/ONCOLOGY | Facility: CLINIC | Age: 63
End: 2018-04-25
Payer: MEDICARE

## 2018-04-25 VITALS
BODY MASS INDEX: 35.55 KG/M2 | OXYGEN SATURATION: 97 % | WEIGHT: 226.94 LBS | TEMPERATURE: 98 F | SYSTOLIC BLOOD PRESSURE: 138 MMHG | DIASTOLIC BLOOD PRESSURE: 63 MMHG | HEART RATE: 76 BPM

## 2018-04-25 DIAGNOSIS — Z17.0 MALIGNANT NEOPLASM OF RIGHT BREAST IN FEMALE, ESTROGEN RECEPTOR POSITIVE, UNSPECIFIED SITE OF BREAST: Primary | ICD-10-CM

## 2018-04-25 DIAGNOSIS — C50.911 MALIGNANT NEOPLASM OF RIGHT BREAST IN FEMALE, ESTROGEN RECEPTOR POSITIVE, UNSPECIFIED SITE OF BREAST: Primary | ICD-10-CM

## 2018-04-25 DIAGNOSIS — D64.9 ANEMIA, UNSPECIFIED TYPE: ICD-10-CM

## 2018-04-25 PROCEDURE — 99999 PR PBB SHADOW E&M-EST. PATIENT-LVL III: CPT | Mod: PBBFAC,,, | Performed by: INTERNAL MEDICINE

## 2018-04-25 PROCEDURE — 3075F SYST BP GE 130 - 139MM HG: CPT | Mod: CPTII,S$GLB,, | Performed by: INTERNAL MEDICINE

## 2018-04-25 PROCEDURE — 3078F DIAST BP <80 MM HG: CPT | Mod: CPTII,S$GLB,, | Performed by: INTERNAL MEDICINE

## 2018-04-25 PROCEDURE — 99213 OFFICE O/P EST LOW 20 MIN: CPT | Mod: S$GLB,,, | Performed by: INTERNAL MEDICINE

## 2018-06-14 DIAGNOSIS — Z13.5 DIABETIC RETINOPATHY SCREENING: ICD-10-CM

## 2018-06-16 ENCOUNTER — LAB VISIT (OUTPATIENT)
Dept: LAB | Facility: HOSPITAL | Age: 63
End: 2018-06-16
Attending: INTERNAL MEDICINE
Payer: MEDICARE

## 2018-06-16 DIAGNOSIS — E78.5 HYPERLIPIDEMIA WITH TARGET LDL LESS THAN 100: ICD-10-CM

## 2018-06-16 DIAGNOSIS — E11.9 CONTROLLED TYPE 2 DIABETES MELLITUS WITHOUT COMPLICATION, WITHOUT LONG-TERM CURRENT USE OF INSULIN: ICD-10-CM

## 2018-06-16 LAB
CHOLEST SERPL-MCNC: 152 MG/DL
CHOLEST/HDLC SERPL: 3 {RATIO}
ESTIMATED AVG GLUCOSE: 157 MG/DL
HBA1C MFR BLD HPLC: 7.1 %
HDLC SERPL-MCNC: 51 MG/DL
HDLC SERPL: 33.6 %
LDLC SERPL CALC-MCNC: 80.8 MG/DL
NONHDLC SERPL-MCNC: 101 MG/DL
TRIGL SERPL-MCNC: 101 MG/DL

## 2018-06-16 PROCEDURE — 80061 LIPID PANEL: CPT

## 2018-06-16 PROCEDURE — 83036 HEMOGLOBIN GLYCOSYLATED A1C: CPT

## 2018-06-16 PROCEDURE — 36415 COLL VENOUS BLD VENIPUNCTURE: CPT | Mod: PO

## 2018-06-20 ENCOUNTER — OFFICE VISIT (OUTPATIENT)
Dept: FAMILY MEDICINE | Facility: CLINIC | Age: 63
End: 2018-06-20
Payer: MEDICARE

## 2018-06-20 VITALS
HEART RATE: 71 BPM | WEIGHT: 227.88 LBS | HEIGHT: 67 IN | SYSTOLIC BLOOD PRESSURE: 122 MMHG | DIASTOLIC BLOOD PRESSURE: 72 MMHG | TEMPERATURE: 98 F | BODY MASS INDEX: 35.76 KG/M2 | OXYGEN SATURATION: 97 %

## 2018-06-20 DIAGNOSIS — D64.9 ANEMIA, UNSPECIFIED TYPE: ICD-10-CM

## 2018-06-20 DIAGNOSIS — Z00.00 ROUTINE MEDICAL EXAM: Primary | ICD-10-CM

## 2018-06-20 DIAGNOSIS — I10 ESSENTIAL HYPERTENSION: ICD-10-CM

## 2018-06-20 DIAGNOSIS — M85.89 OSTEOPENIA OF MULTIPLE SITES: ICD-10-CM

## 2018-06-20 DIAGNOSIS — E89.0 POSTOPERATIVE HYPOTHYROIDISM: ICD-10-CM

## 2018-06-20 DIAGNOSIS — D57.3 SICKLE CELL TRAIT: ICD-10-CM

## 2018-06-20 DIAGNOSIS — E66.01 SEVERE OBESITY (BMI 35.0-35.9 WITH COMORBIDITY): ICD-10-CM

## 2018-06-20 DIAGNOSIS — E78.5 HYPERLIPIDEMIA WITH TARGET LDL LESS THAN 100: ICD-10-CM

## 2018-06-20 DIAGNOSIS — C50.511 MALIGNANT NEOPLASM OF LOWER-OUTER QUADRANT OF RIGHT FEMALE BREAST, UNSPECIFIED ESTROGEN RECEPTOR STATUS: ICD-10-CM

## 2018-06-20 DIAGNOSIS — Z71.89 ADVANCED DIRECTIVES, COUNSELING/DISCUSSION: ICD-10-CM

## 2018-06-20 DIAGNOSIS — Z23 NEED FOR SHINGLES VACCINE: ICD-10-CM

## 2018-06-20 PROCEDURE — 99396 PREV VISIT EST AGE 40-64: CPT | Mod: S$GLB,,, | Performed by: INTERNAL MEDICINE

## 2018-06-20 PROCEDURE — 99999 PR PBB SHADOW E&M-EST. PATIENT-LVL III: CPT | Mod: PBBFAC,,, | Performed by: INTERNAL MEDICINE

## 2018-06-20 PROCEDURE — 3074F SYST BP LT 130 MM HG: CPT | Mod: CPTII,S$GLB,, | Performed by: INTERNAL MEDICINE

## 2018-06-20 PROCEDURE — 3078F DIAST BP <80 MM HG: CPT | Mod: CPTII,S$GLB,, | Performed by: INTERNAL MEDICINE

## 2018-06-20 PROCEDURE — 3045F PR MOST RECENT HEMOGLOBIN A1C LEVEL 7.0-9.0%: CPT | Mod: CPTII,S$GLB,, | Performed by: INTERNAL MEDICINE

## 2018-06-20 RX ORDER — HYDROCHLOROTHIAZIDE 25 MG/1
25 TABLET ORAL DAILY
Qty: 90 TABLET | Refills: 1 | Status: SHIPPED | OUTPATIENT
Start: 2018-06-20 | End: 2018-09-12 | Stop reason: SDUPTHER

## 2018-06-20 RX ORDER — GLIPIZIDE 10 MG/1
TABLET, FILM COATED, EXTENDED RELEASE ORAL
Qty: 180 TABLET | Refills: 1 | Status: SHIPPED | OUTPATIENT
Start: 2018-06-20 | End: 2018-09-12 | Stop reason: SDUPTHER

## 2018-06-20 RX ORDER — LOSARTAN POTASSIUM 25 MG/1
25 TABLET ORAL DAILY
Qty: 90 TABLET | Refills: 1 | Status: SHIPPED | OUTPATIENT
Start: 2018-06-20 | End: 2018-09-12 | Stop reason: SDUPTHER

## 2018-06-20 RX ORDER — METFORMIN HYDROCHLORIDE 1000 MG/1
1000 TABLET ORAL 2 TIMES DAILY
Qty: 180 TABLET | Refills: 1 | Status: SHIPPED | OUTPATIENT
Start: 2018-06-20 | End: 2018-09-12 | Stop reason: SDUPTHER

## 2018-06-20 RX ORDER — LOVASTATIN 20 MG/1
20 TABLET ORAL DAILY
Qty: 90 TABLET | Refills: 1 | Status: SHIPPED | OUTPATIENT
Start: 2018-06-20 | End: 2018-09-12 | Stop reason: SDUPTHER

## 2018-06-20 NOTE — PROGRESS NOTES
HISTORY OF PRESENT ILLNESS:  Masha Garcia is a 63 y.o. female who presents to the clinic today for a routine medical physical exam. Her last physical exam was approximately 1 years(s) ago.        PAST MEDICAL HISTORY:  Past Medical History:   Diagnosis Date    AR (allergic rhinitis)     Breast cancer 06/2013    right; followed by oncology, Dr. Oliveira; s/p lumpectomy and  radiation    History of colonic polyps     Hyperlipidemia LDL goal < 100     Hypertension     Lower extremity edema     Mild chronic anemia     Obesity     Osteopenia     Sickle cell trait     Thyroid nodule     Type II or unspecified type diabetes mellitus without mention of complication, uncontrolled     Vitamin D deficiency disease        PAST SURGICAL HISTORY:  Past Surgical History:   Procedure Laterality Date    benign right breast biopsy  2012    lumpectomy right breast  6/2013    right ankle fracture repair      TOTAL ABDOMINAL HYSTERECTOMY      TOTAL THYROIDECTOMY  12/28/2016       SOCIAL HISTORY:  Social History     Social History    Marital status:      Spouse name: N/A    Number of children: 3    Years of education: N/A     Occupational History    [employer]      Social History Main Topics    Smoking status: Never Smoker    Smokeless tobacco: Never Used    Alcohol use No    Drug use: Unknown    Sexual activity: Not on file     Other Topics Concern    Not on file     Social History Narrative    No narrative on file       FAMILY HISTORY:  Family History   Problem Relation Age of Onset    Diabetes Mother     Hypertension Mother     Hypertension Father     Prostate cancer Father     Diabetes Brother     Cancer Neg Hx     Breast cancer Neg Hx     Colon cancer Neg Hx     Ovarian cancer Neg Hx     Amblyopia Neg Hx     Blindness Neg Hx     Cataracts Neg Hx     Glaucoma Neg Hx     Macular degeneration Neg Hx     Retinal detachment Neg Hx     Strabismus Neg Hx     Stroke Neg Hx     Thyroid  disease Neg Hx        ALLERGIES AND MEDICATIONS: updated and reviewed.  Review of patient's allergies indicates:   Allergen Reactions    Fosamax [alendronate] Other (See Comments)     - 'funny feeling'    Ace inhibitors Other (See Comments)     cough    Penicillins Hives     Medication List with Changes/Refills   Current Medications    ANASTROZOLE (ARIMIDEX) 1 MG TAB    Take 1 mg by mouth every morning.     CALCIUM CARBONATE-VITAMIN D3 (CALCIUM 600 + D,3,) 600 MG CALCIUM- 200 UNIT CAP    Take 1 tablet by mouth every morning.     LEVOTHYROXINE (SYNTHROID) 125 MCG TABLET    TAKE ONE TABLET BY MOUTH ONCE DAILY BEFORE BREAKFAST    LORATADINE (CLARITIN) 10 MG TABLET    Take 10 mg by mouth daily as needed.     OXYCODONE-ACETAMINOPHEN (PERCOCET) 5-325 MG PER TABLET    Take 1 tablet by mouth every 4 (four) hours as needed for Pain.   Changed and/or Refilled Medications    Modified Medication Previous Medication    GLIPIZIDE (GLUCOTROL) 10 MG TR24 glipiZIDE (GLUCOTROL) 10 MG TR24       TAKE ONE TABLET BY MOUTH TWICE DAILY    TAKE ONE TABLET BY MOUTH TWICE DAILY    HYDROCHLOROTHIAZIDE (HYDRODIURIL) 25 MG TABLET hydroCHLOROthiazide (HYDRODIURIL) 25 MG tablet       Take 1 tablet (25 mg total) by mouth once daily.    Take 1 tablet (25 mg total) by mouth once daily.    LINAGLIPTIN (TRADJENTA) 5 MG TAB TABLET linagliptin (TRADJENTA) 5 mg Tab tablet       Take 1 tablet (5 mg total) by mouth once daily.    Take 1 tablet (5 mg total) by mouth once daily.    LOSARTAN (COZAAR) 25 MG TABLET losartan (COZAAR) 25 MG tablet       Take 1 tablet (25 mg total) by mouth once daily.    Take 1 tablet (25 mg total) by mouth once daily.    LOVASTATIN (MEVACOR) 20 MG TABLET lovastatin (MEVACOR) 20 MG tablet       Take 1 tablet (20 mg total) by mouth once daily.    Take 1 tablet (20 mg total) by mouth once daily.    METFORMIN (GLUCOPHAGE) 1000 MG TABLET metFORMIN (GLUCOPHAGE) 1000 MG tablet       Take 1 tablet (1,000 mg total) by mouth 2 (two)  times daily.    Take 1 tablet (1,000 mg total) by mouth 2 (two) times daily.   Discontinued Medications    ALENDRONATE (FOSAMAX) 70 MG TABLET    Take 1 tablet (70 mg total) by mouth every 7 days.         CARE TEAM:  Patient Care Team:  Brenda Bailey MD as PCP - General (Internal Medicine)  Eun Frederick MD as Consulting Physician (Oncology)  Elsy Dubose MD as Consulting Physician (Endocrinology)           SCREENING HISTORY:  Health Maintenance       Date Due Completion Date    Zoster Vaccine 03/09/2015 ---    Urine Microalbumin 04/03/2018 4/3/2017    Eye Exam 05/31/2018 5/31/2017 (Done)    Override on 5/31/2017: Done    Override on 12/9/2014: Done    Override on 11/1/2010: Done    Influenza Vaccine 08/01/2018 12/6/2017    Foot Exam 12/06/2018 12/6/2017    Override on 9/28/2016: Done (normal see note in visit)    Hemoglobin A1c 12/16/2018 6/16/2018    Colonoscopy 06/13/2019 6/13/2014 (Done)    Override on 6/13/2014: Done (Normal per Dr. Vallejo)    Lipid Panel 06/16/2019 6/16/2018    Mammogram 06/19/2019 6/19/2018 (Done)    Override on 6/19/2018: Done (imageing center)    Override on 6/15/2016: Done (done at [facility] normal scanned report to chart)    Override on 3/18/2015: Done    Override on 3/1/2014: Done ([facility])    Override on 4/1/2013: Done    Override on 2/1/2012: Done    Low Dose Statin 06/20/2019 6/20/2018    TETANUS VACCINE 07/24/2019 7/24/2009    DEXA SCAN 12/13/2019 12/13/2017    Pneumococcal PPSV23 (High Risk) (3) 12/06/2022 12/6/2017            REVIEW OF SYSTEMS:   The patient reports fair dietary habits.  The patient does not exercise regularly.  Review of Systems   Constitutional: Negative for chills, fatigue, fever and unexpected weight change.   HENT: Negative for congestion, ear discharge, ear pain and postnasal drip.    Eyes: Negative for photophobia, pain and visual disturbance.   Respiratory: Negative for cough, shortness of breath and wheezing.   "  Cardiovascular: Negative for chest pain, palpitations and leg swelling.   Gastrointestinal: Negative for abdominal pain, constipation, diarrhea, nausea and vomiting.   Genitourinary: Negative for dysuria, frequency, urgency and vaginal discharge.   Musculoskeletal: Positive for arthralgias (- mild; chronic). Negative for back pain, joint swelling and neck stiffness.        - she has a small cyst on her right middle finger DIP joint   Skin: Negative for rash.   Neurological: Negative for weakness and headaches.   Psychiatric/Behavioral: Negative for dysphoric mood and sleep disturbance. The patient is not nervous/anxious.      Breast ROS: negative for breast lumps             Physical Examination:   Vitals:    06/20/18 0931   BP: 122/72   Pulse: 71   Temp: 97.9 °F (36.6 °C)     Weight: 103.4 kg (227 lb 13.5 oz)   Height: 5' 7" (170.2 cm)   Body mass index is 35.69 kg/m².      Patient did not require to have a chaperone present during the exam today.  General appearance - alert, well appearing, and in no distress and obese  Mental status - alert, oriented to person, place, and time, normal mood, behavior, speech, dress, motor activity, and thought processes  Eyes - pupils equal and reactive, extraocular eye movements intact, sclera anicteric  Mouth - mucous membranes moist, pharynx normal without lesions  Neck - supple, no significant adenopathy, carotids upstroke normal bilaterally, no bruits, thyroid exam: thyroidectomy scar noted  Lymphatics - no palpable lymphadenopathy  Chest - clear to auscultation, no wheezes, rales or rhonchi, symmetric air entry  Heart - normal rate and regular rhythm, no gallops noted  Abdomen - soft, nontender, nondistended, no masses or organomegaly  Breasts - not examined  Back exam - mild limited range of motion, no pain with motion noted during exam  Neurological - alert, oriented, normal speech, no focal findings or movement disorder noted, cranial nerves II through XII " intact  Musculoskeletal - no muscular tenderness noted, Mild-Moderate osteoarthritic changes noted to both knee joints. No joint effusions noted.   Extremities - pedal edema trace +, intact peripheral pulses  Skin - normal coloration and turgor, no rashes, no suspicious skin lesions noted      Results for orders placed or performed in visit on 06/16/18   Lipid panel   Result Value Ref Range    Cholesterol 152 120 - 199 mg/dL    Triglycerides 101 30 - 150 mg/dL    HDL 51 40 - 75 mg/dL    LDL Cholesterol 80.8 63.0 - 159.0 mg/dL    HDL/Chol Ratio 33.6 20.0 - 50.0 %    Total Cholesterol/HDL Ratio 3.0 2.0 - 5.0    Non-HDL Cholesterol 101 mg/dL   Hemoglobin A1c   Result Value Ref Range    Hemoglobin A1C 7.1 (H) 4.0 - 5.6 %    Estimated Avg Glucose 157 (H) 68 - 131 mg/dL          ASSESSMENT AND PLAN:  1. Routine medical exam  Counseled on age appropriate medical preventative services including age appropriate cancer screenings, age appropriate eye and dental exams, over all nutritional health, need for a consistent exercise regimen, and an over all push towards maintaining a vigorous and active lifestyle.  Counseled on age appropriate vaccines and discussed upcoming health care needs based on age/gender. Discussed good sleep hygiene and stress management.     2. Uncontrolled type 2 diabetes mellitus without complication, without long-term current use of insulin  Diabetes currently is borderline controlled for age and comorbid conditions. We discussed diabetic diet and regular exercise. We discussed home blood sugar monitoring, if appropriate.  She is asking for some help in being able to afford the Tradjenta.    - Microalbumin/creatinine urine ratio; Future  - metFORMIN (GLUCOPHAGE) 1000 MG tablet; Take 1 tablet (1,000 mg total) by mouth 2 (two) times daily.  Dispense: 180 tablet; Refill: 1  - linagliptin (TRADJENTA) 5 mg Tab tablet; Take 1 tablet (5 mg total) by mouth once daily.  Dispense: 30 tablet; Refill: 5  -  glipiZIDE (GLUCOTROL) 10 MG TR24; TAKE ONE TABLET BY MOUTH TWICE DAILY  Dispense: 180 tablet; Refill: 1    3. Essential hypertension  Discussed sodium restriction, maintaining ideal body weight and regular exercise program as physiologic means to achieve blood pressure control. The patient will strive towards this. The current medical regimen is effective;  continue present plan and medications. Recommended patient to check home readings to monitor and see me for followup as scheduled or sooner as needed. Patient was educated that both decongestant and anti-inflammatory medication may raise blood pressure.   - losartan (COZAAR) 25 MG tablet; Take 1 tablet (25 mg total) by mouth once daily.  Dispense: 90 tablet; Refill: 1  - hydroCHLOROthiazide (HYDRODIURIL) 25 MG tablet; Take 1 tablet (25 mg total) by mouth once daily.  Dispense: 90 tablet; Refill: 1    4. Hyperlipidemia with target LDL less than 100  We discussed low fat diet and regular exercise.The current medical regimen is effective;  continue present plan and medications.    - lovastatin (MEVACOR) 20 MG tablet; Take 1 tablet (20 mg total) by mouth once daily.  Dispense: 90 tablet; Refill: 1    5. Osteopenia of multiple sites  We discussed adequate calcium and vitamin D supplementation. We discussed fall precautions. She is up to date on her BMD. She could not tolerate the fosamax. She will discuss other options with endocrinology.     6. Malignant neoplasm of lower-outer quadrant of right female breast, unspecified estrogen receptor status  Currently on arimidex. Followed by oncology.    7. Sickle cell trait  Stable. Asymptomatic. Observe.     8. Anemia, unspecified type  She had a colonoscopy in 2014 which was normal.  She will be due again in 2019.  She is scheduled for EGD per Hematology.    9. Postoperative hypothyroidism  Patient is clinically euthyroid. Continue current regimen.     10. Severe obesity (BMI 35.0-35.9 with comorbidity)  The patient is  asked to make an attempt to improve diet and exercise patterns to aid in medical management of this problem.     11. Need for shingles vaccine  Patient was advised to get immunization at the pharmacy.     12. Advanced directives, counseling/discussion  I had a discussion today with the patient regarding advanced directives.  Advanced directives were discussed due to patient's age and/or chronic medical conditions. Prognosis based on current condition: good. Paperwork was given to complete living will and medical POA. [place] was not discussed. Approximately 5 minute(s) spent on counseling/discussion regarding end of life decision making.           Follow-up in about 6 months (around 12/20/2018), or if symptoms worsen or fail to improve, for follow up chronic medical conditions.. or sooner as needed.

## 2018-07-23 ENCOUNTER — LAB VISIT (OUTPATIENT)
Dept: LAB | Facility: HOSPITAL | Age: 63
End: 2018-07-23
Attending: INTERNAL MEDICINE
Payer: MEDICARE

## 2018-07-23 DIAGNOSIS — Z17.0 MALIGNANT NEOPLASM OF RIGHT BREAST IN FEMALE, ESTROGEN RECEPTOR POSITIVE, UNSPECIFIED SITE OF BREAST: ICD-10-CM

## 2018-07-23 DIAGNOSIS — C50.911 MALIGNANT NEOPLASM OF RIGHT BREAST IN FEMALE, ESTROGEN RECEPTOR POSITIVE, UNSPECIFIED SITE OF BREAST: ICD-10-CM

## 2018-07-23 DIAGNOSIS — D64.9 ANEMIA, UNSPECIFIED TYPE: ICD-10-CM

## 2018-07-23 LAB
ALBUMIN SERPL BCP-MCNC: 3.2 G/DL
ALP SERPL-CCNC: 74 U/L
ALT SERPL W/O P-5'-P-CCNC: 16 U/L
ANION GAP SERPL CALC-SCNC: 10 MMOL/L
AST SERPL-CCNC: 21 U/L
BASOPHILS # BLD AUTO: 0.03 K/UL
BASOPHILS NFR BLD: 0.3 %
BILIRUB SERPL-MCNC: 0.6 MG/DL
BUN SERPL-MCNC: 9 MG/DL
CALCIUM SERPL-MCNC: 9.8 MG/DL
CHLORIDE SERPL-SCNC: 105 MMOL/L
CO2 SERPL-SCNC: 25 MMOL/L
CREAT SERPL-MCNC: 0.8 MG/DL
DIFFERENTIAL METHOD: ABNORMAL
EOSINOPHIL # BLD AUTO: 0.1 K/UL
EOSINOPHIL NFR BLD: 1.3 %
ERYTHROCYTE [DISTWIDTH] IN BLOOD BY AUTOMATED COUNT: 14.9 %
EST. GFR  (AFRICAN AMERICAN): >60 ML/MIN/1.73 M^2
EST. GFR  (NON AFRICAN AMERICAN): >60 ML/MIN/1.73 M^2
GLUCOSE SERPL-MCNC: 171 MG/DL
HCT VFR BLD AUTO: 31.4 %
HGB BLD-MCNC: 10.8 G/DL
IRON SERPL-MCNC: 55 UG/DL
LYMPHOCYTES # BLD AUTO: 3.3 K/UL
LYMPHOCYTES NFR BLD: 30.4 %
MCH RBC QN AUTO: 28.6 PG
MCHC RBC AUTO-ENTMCNC: 34.4 G/DL
MCV RBC AUTO: 83 FL
MONOCYTES # BLD AUTO: 0.5 K/UL
MONOCYTES NFR BLD: 4.6 %
NEUTROPHILS # BLD AUTO: 6.8 K/UL
NEUTROPHILS NFR BLD: 63.4 %
PLATELET # BLD AUTO: 278 K/UL
PMV BLD AUTO: 10.2 FL
POTASSIUM SERPL-SCNC: 3.6 MMOL/L
PROT SERPL-MCNC: 7.1 G/DL
RBC # BLD AUTO: 3.78 M/UL
SATURATED IRON: 18 %
SODIUM SERPL-SCNC: 140 MMOL/L
TOTAL IRON BINDING CAPACITY: 303 UG/DL
TRANSFERRIN SERPL-MCNC: 205 MG/DL
WBC # BLD AUTO: 10.69 K/UL

## 2018-07-23 PROCEDURE — 85025 COMPLETE CBC W/AUTO DIFF WBC: CPT | Mod: PO

## 2018-07-23 PROCEDURE — 36415 COLL VENOUS BLD VENIPUNCTURE: CPT | Mod: PO

## 2018-07-23 PROCEDURE — 83540 ASSAY OF IRON: CPT

## 2018-07-23 PROCEDURE — 80053 COMPREHEN METABOLIC PANEL: CPT

## 2018-07-24 NOTE — PROGRESS NOTES
Chief Complaint :   Breast Cancer    Hx of Present illness :  Patient is a 63 y.o. year old female who presents to the clinic  for oncology followup. dx'd to have Carcinoma Right Breast  In 2013. Lumpectomy and radiation therapy followed by adjuvant Arimidex therapy. Breast cancer Index did not show  Any benefit with extended harmonal therapy. Will complete five years of Arimidex in 2018. Feels well. In good spirits. Mild hot flashes; no vaginal bleeding or discharge. Mild pain knees at night. No overt bleeding. No fever, cough, dyspnea . Appetite good. No Urinary sx. Mild diarrhoea.  Persistent sinus drip.  Seeing  for GI symptoms.    Allergies :    Review of patient's allergies indicates:   Allergen Reactions    Ace inhibitors Other (See Comments)     cough    Penicillins Hives       Occupation : retired .    Transfusion :  None    Menstrual & obstetric Hx :  3; para 3.  Age of menarche: 12  Age of first pregnancy: 18  Lactation history: None  Age of menopause:  Hysterectomy in her late 40's for bleeding  HRT:  For many years.    Present Meds :   Medication List with Changes/Refills   Current Medications    ANASTROZOLE (ARIMIDEX) 1 MG TAB    Take 1 mg by mouth every morning.     CALCIUM CARBONATE-VITAMIN D3 (CALCIUM 600 + D,3,) 600 MG CALCIUM- 200 UNIT CAP    Take 1 tablet by mouth every morning.     GLIPIZIDE (GLUCOTROL) 10 MG TR24    TAKE ONE TABLET BY MOUTH TWICE DAILY    HYDROCHLOROTHIAZIDE (HYDRODIURIL) 25 MG TABLET    Take 1 tablet (25 mg total) by mouth once daily.    LEVOTHYROXINE (SYNTHROID) 125 MCG TABLET    TAKE ONE TABLET BY MOUTH ONCE DAILY BEFORE BREAKFAST    LINAGLIPTIN (TRADJENTA) 5 MG TAB TABLET    Take 1 tablet (5 mg total) by mouth once daily.    LORATADINE (CLARITIN) 10 MG TABLET    Take 10 mg by mouth daily as needed.     LOSARTAN (COZAAR) 25 MG TABLET    Take 1 tablet (25 mg total) by mouth once daily.    LOVASTATIN (MEVACOR) 20 MG TABLET    Take 1 tablet (20 mg  total) by mouth once daily.    METFORMIN (GLUCOPHAGE) 1000 MG TABLET    Take 1 tablet (1,000 mg total) by mouth 2 (two) times daily.    OXYCODONE-ACETAMINOPHEN (PERCOCET) 5-325 MG PER TABLET    Take 1 tablet by mouth every 4 (four) hours as needed for Pain.       Past Medical Hx :  Carcinoma right breast. Diabetes Mellitus type 2; Hypertension; hyperlipidemia; Allergic rhinitis; osteopenia; sickle cell trait; Thyroid nodule. No Hx of hepatitis, TB, Sarcoid, Lupus, rheumatoid arthritis, seizure disorder, CVA, PE or DVT. No prior Hx of Chemotherapy    Past Medical Hx :  Past Medical History:   Diagnosis Date    AR (allergic rhinitis)     Breast cancer 06/2013    right; followed by oncology, Dr. Oliveira; s/p lumpectomy and  radiation    History of colonic polyps     Hyperlipidemia LDL goal < 100     Hypertension     Lower extremity edema     Mild chronic anemia     Obesity     Osteopenia     Sickle cell trait     Thyroid nodule     Type II or unspecified type diabetes mellitus without mention of complication, uncontrolled     Vitamin D deficiency disease        Travel Hx :  N/A    Immunization :  Immunization History   Administered Date(s) Administered    Influenza - Quadrivalent - PF 09/28/2016, 12/06/2017    Influenza Split 11/26/2012, 01/10/2014    Pneumococcal Conjugate - 13 Valent 05/04/2016    Pneumococcal Polysaccharide - 23 Valent 08/13/2008, 12/06/2017    Tdap 07/24/2009    influenza - Quadrivalent 10/08/2014       Family Hx : reviewed  Family History   Problem Relation Age of Onset    Diabetes Mother     Hypertension Mother     Hypertension Father     Prostate cancer Father     Diabetes Brother     Cancer Neg Hx     Breast cancer Neg Hx     Colon cancer Neg Hx     Ovarian cancer Neg Hx     Amblyopia Neg Hx     Blindness Neg Hx     Cataracts Neg Hx     Glaucoma Neg Hx     Macular degeneration Neg Hx     Retinal detachment Neg Hx     Strabismus Neg Hx     Stroke Neg Hx      Thyroid disease Neg Hx        Social Hx : Reviewed  Social History     Social History    Marital status:      Spouse name: N/A    Number of children: 3    Years of education: N/A     Occupational History    [employer]      Social History Main Topics    Smoking status: Never Smoker    Smokeless tobacco: Never Used    Alcohol use No    Drug use: Unknown    Sexual activity: Not on file     Other Topics Concern    Not on file     Social History Narrative    No narrative on file       Surgery :  Right Breast Bx; Lumpectomy Right; Tubal ligation; Complete Hysterectomy; Thyroidectomy.  Colonoscopy in 2014.    Symptoms :    Review of Systems   Constitutional: Positive for malaise/fatigue. Negative for chills, diaphoresis, fever and weight loss.   HENT: Positive for congestion. Negative for ear discharge, ear pain, hearing loss, nosebleeds, sinus pain, sore throat and tinnitus.    Eyes: Negative for blurred vision, double vision, photophobia, pain, discharge and redness.   Respiratory: Negative for cough, hemoptysis, sputum production, shortness of breath, wheezing and stridor.    Cardiovascular: Negative for chest pain, palpitations, orthopnea, claudication, leg swelling and PND.   Gastrointestinal: Positive for diarrhea (Mild). Negative for abdominal pain, blood in stool, constipation, heartburn, nausea and vomiting.   Genitourinary: Negative for dysuria, flank pain, frequency, hematuria and urgency.   Musculoskeletal: Positive for joint pain. Negative for back pain, falls, myalgias and neck pain.   Skin: Negative for itching and rash.   Neurological: Negative for dizziness, tingling, tremors, sensory change, speech change, focal weakness, seizures, loss of consciousness, weakness and headaches.   Endo/Heme/Allergies: Negative for polydipsia. Does not bruise/bleed easily.   Psychiatric/Behavioral: Negative for depression, hallucinations, memory loss, substance abuse and suicidal ideas. The patient is  nervous/anxious (Sometimes) and has insomnia.        Physical Exam :  Nurse Ana present in the room  Physical Exam   Constitutional: She is oriented to person, place, and time and well-developed, well-nourished, and in no distress. No distress.   HENT:   Head: Normocephalic and atraumatic.   Right Ear: External ear normal.   Left Ear: External ear normal.   Nose: Nose normal.   Mouth/Throat: Oropharynx is clear and moist. No oropharyngeal exudate.   Eyes: Conjunctivae and EOM are normal. Pupils are equal, round, and reactive to light. Right eye exhibits no discharge. Left eye exhibits no discharge. No scleral icterus.   Neck: Normal range of motion and full passive range of motion without pain. Neck supple.       Cardiovascular: Normal rate, regular rhythm, S1 normal, normal heart sounds, intact distal pulses and normal pulses.  PMI is not displaced.    Pulmonary/Chest: Breath sounds normal. No apnea. Right breast exhibits no inverted nipple, no mass, no nipple discharge, no skin change and no tenderness. Left breast exhibits no inverted nipple, no mass, no nipple discharge, no skin change and no tenderness.       Abdominal: Soft. Normal appearance, normal aorta and bowel sounds are normal. She exhibits no distension, no ascites and no mass. There is no hepatosplenomegaly, splenomegaly or hepatomegaly. There is no tenderness. There is no rebound and no CVA tenderness. No hernia.   Genitourinary:   Genitourinary Comments: Not Examined   Musculoskeletal: Normal range of motion.   Lymphadenopathy:        Head (right side): No submental, no submandibular, no tonsillar, no preauricular, no posterior auricular and no occipital adenopathy present.        Head (left side): No submental, no submandibular, no tonsillar, no preauricular, no posterior auricular and no occipital adenopathy present.     She has no cervical adenopathy.     She has no axillary adenopathy.        Right: No inguinal, no supraclavicular and no  epitrochlear adenopathy present.        Left: No inguinal, no supraclavicular and no epitrochlear adenopathy present.   Neurological: She is alert and oriented to person, place, and time. She has normal motor skills, normal sensation, normal strength, normal reflexes and intact cranial nerves. Gait normal. GCS score is 15.   Skin: Skin is warm, dry and intact. No rash noted. She is not diaphoretic. No cyanosis. No pallor. Nails show no clubbing.   Psychiatric: Mood, memory, affect and judgment normal.         Labs & Imaging :  04/23/18 : NFBS 90; Cr. 0.7 Bili 0.2 Ca 9.3 ALP 71. Hgb 10; Hct 32.4. Plts 276,000 ANC 4,900  07/23/18 :  Serum Iron 55; TIBC  303; sat 18 %; NFBS 171; Cr.0.8; eGFR >60; Ca 9.8; Bili 0.6;  ALP 74.  Hgb 10.8;  Hct 31.4; MCV 83; Plts 278,000 ANC 6,800.      Dx :  Carcinoma Right breast in remission.        Assessment & Plan:  Reviewed with patient.  had done Bone Density studies in Dec 2017. Continue Arimidex and Fosamax. OTC Iron One daily.  Endocrine followup with Dr.Amy Dubose.  Routine Medical followup with . GI followup with   Oncology followup in 3 months with CBC,CMP,Iron Study.

## 2018-07-25 ENCOUNTER — OFFICE VISIT (OUTPATIENT)
Dept: HEMATOLOGY/ONCOLOGY | Facility: CLINIC | Age: 63
End: 2018-07-25
Payer: MEDICARE

## 2018-07-25 VITALS
SYSTOLIC BLOOD PRESSURE: 114 MMHG | OXYGEN SATURATION: 97 % | WEIGHT: 229.06 LBS | TEMPERATURE: 98 F | HEART RATE: 71 BPM | BODY MASS INDEX: 35.88 KG/M2 | DIASTOLIC BLOOD PRESSURE: 60 MMHG

## 2018-07-25 DIAGNOSIS — D64.9 ANEMIA, UNSPECIFIED TYPE: ICD-10-CM

## 2018-07-25 DIAGNOSIS — Z17.0 MALIGNANT NEOPLASM OF RIGHT BREAST IN FEMALE, ESTROGEN RECEPTOR POSITIVE, UNSPECIFIED SITE OF BREAST: Primary | ICD-10-CM

## 2018-07-25 DIAGNOSIS — C50.911 MALIGNANT NEOPLASM OF RIGHT BREAST IN FEMALE, ESTROGEN RECEPTOR POSITIVE, UNSPECIFIED SITE OF BREAST: Primary | ICD-10-CM

## 2018-07-25 PROCEDURE — 3078F DIAST BP <80 MM HG: CPT | Mod: CPTII,S$GLB,, | Performed by: INTERNAL MEDICINE

## 2018-07-25 PROCEDURE — 3008F BODY MASS INDEX DOCD: CPT | Mod: CPTII,S$GLB,, | Performed by: INTERNAL MEDICINE

## 2018-07-25 PROCEDURE — 99213 OFFICE O/P EST LOW 20 MIN: CPT | Mod: S$GLB,,, | Performed by: INTERNAL MEDICINE

## 2018-07-25 PROCEDURE — 99999 PR PBB SHADOW E&M-EST. PATIENT-LVL III: CPT | Mod: PBBFAC,,, | Performed by: INTERNAL MEDICINE

## 2018-07-25 PROCEDURE — 3074F SYST BP LT 130 MM HG: CPT | Mod: CPTII,S$GLB,, | Performed by: INTERNAL MEDICINE

## 2018-07-26 ENCOUNTER — TELEPHONE (OUTPATIENT)
Dept: FAMILY MEDICINE | Facility: CLINIC | Age: 63
End: 2018-07-26

## 2018-07-26 NOTE — TELEPHONE ENCOUNTER
Please call patient and tell them that they do not have any protein in their urine.  Thanks,  Beatriz

## 2018-08-21 ENCOUNTER — OFFICE VISIT (OUTPATIENT)
Dept: OPTOMETRY | Facility: CLINIC | Age: 63
End: 2018-08-21
Payer: MEDICARE

## 2018-08-21 DIAGNOSIS — H52.4 HYPEROPIA WITH PRESBYOPIA, UNSPECIFIED LATERALITY: ICD-10-CM

## 2018-08-21 DIAGNOSIS — H52.00 HYPEROPIA WITH PRESBYOPIA, UNSPECIFIED LATERALITY: ICD-10-CM

## 2018-08-21 DIAGNOSIS — Z01.00 EYE EXAM, ROUTINE: Primary | ICD-10-CM

## 2018-08-21 PROCEDURE — 92014 COMPRE OPH EXAM EST PT 1/>: CPT | Mod: S$GLB,,, | Performed by: OPTOMETRIST

## 2018-08-21 PROCEDURE — 99999 PR PBB SHADOW E&M-EST. PATIENT-LVL II: CPT | Mod: PBBFAC,,, | Performed by: OPTOMETRIST

## 2018-08-21 NOTE — PROGRESS NOTES
HPI     DSL- 5/31/17 Dr. Lindsay    63 y.o female is here for Diabetic eye exam. Pt states no Va change. Pt   denies eye allergies, glare, floaters, and flashes.     Eyemeds  No gtts    Hemoglobin A1C       Date                     Value               Ref Range             Status                06/16/2018               7.1 (H)             4.0 - 5.6 %           Final                  Last edited by Baljeet Nichols on 8/21/2018  1:37 PM. (History)            Assessment /Plan     For exam results, see Encounter Report.    Eye exam, routine  -Eyemed exam  -No retinopathy noted today.  Continued control with primary care physician and annual comprehensive eye exam.    Hyperopia with presbyopia, unspecified laterality  Eyeglass Final Rx     Eyeglass Final Rx       Sphere Cylinder Axis Add    Right +1.50 Sphere  +2.50    Left +1.75 +0.75 010 +2.50    Type:  PAL    Expiration Date:  8/22/2019                  RTC 1 yr

## 2018-09-12 DIAGNOSIS — E89.0 POSTOPERATIVE HYPOTHYROIDISM: ICD-10-CM

## 2018-09-12 DIAGNOSIS — E78.5 HYPERLIPIDEMIA WITH TARGET LDL LESS THAN 100: ICD-10-CM

## 2018-09-12 DIAGNOSIS — I10 ESSENTIAL HYPERTENSION: ICD-10-CM

## 2018-09-12 RX ORDER — LOVASTATIN 20 MG/1
20 TABLET ORAL DAILY
Qty: 90 TABLET | Refills: 0 | Status: SHIPPED | OUTPATIENT
Start: 2018-09-12 | End: 2018-09-13 | Stop reason: SDUPTHER

## 2018-09-12 RX ORDER — METFORMIN HYDROCHLORIDE 1000 MG/1
1000 TABLET ORAL 2 TIMES DAILY
Qty: 180 TABLET | Refills: 0 | Status: SHIPPED | OUTPATIENT
Start: 2018-09-12 | End: 2018-09-13 | Stop reason: SDUPTHER

## 2018-09-12 RX ORDER — HYDROCHLOROTHIAZIDE 25 MG/1
25 TABLET ORAL DAILY
Qty: 90 TABLET | Refills: 0 | Status: SHIPPED | OUTPATIENT
Start: 2018-09-12 | End: 2018-09-18 | Stop reason: SDUPTHER

## 2018-09-12 RX ORDER — LEVOTHYROXINE SODIUM 125 UG/1
125 TABLET ORAL
Qty: 90 TABLET | Refills: 0 | Status: SHIPPED | OUTPATIENT
Start: 2018-09-12 | End: 2018-09-13 | Stop reason: SDUPTHER

## 2018-09-12 RX ORDER — GLIPIZIDE 10 MG/1
TABLET, FILM COATED, EXTENDED RELEASE ORAL
Qty: 180 TABLET | Refills: 0 | Status: SHIPPED | OUTPATIENT
Start: 2018-09-12 | End: 2018-09-18 | Stop reason: SDUPTHER

## 2018-09-12 RX ORDER — LOSARTAN POTASSIUM 25 MG/1
25 TABLET ORAL DAILY
Qty: 90 TABLET | Refills: 0 | Status: SHIPPED | OUTPATIENT
Start: 2018-09-12 | End: 2018-09-13 | Stop reason: SDUPTHER

## 2018-09-13 DIAGNOSIS — E89.0 POSTOPERATIVE HYPOTHYROIDISM: ICD-10-CM

## 2018-09-13 DIAGNOSIS — I10 ESSENTIAL HYPERTENSION: ICD-10-CM

## 2018-09-13 DIAGNOSIS — E78.5 HYPERLIPIDEMIA WITH TARGET LDL LESS THAN 100: ICD-10-CM

## 2018-09-13 RX ORDER — METFORMIN HYDROCHLORIDE 1000 MG/1
1000 TABLET ORAL 2 TIMES DAILY
Qty: 180 TABLET | Refills: 0 | Status: SHIPPED | OUTPATIENT
Start: 2018-09-13 | End: 2018-11-20 | Stop reason: SDUPTHER

## 2018-09-13 RX ORDER — LOSARTAN POTASSIUM 25 MG/1
25 TABLET ORAL DAILY
Qty: 90 TABLET | Refills: 0 | Status: SHIPPED | OUTPATIENT
Start: 2018-09-13 | End: 2018-11-20 | Stop reason: SDUPTHER

## 2018-09-13 RX ORDER — LEVOTHYROXINE SODIUM 125 UG/1
125 TABLET ORAL
Qty: 90 TABLET | Refills: 0 | Status: SHIPPED | OUTPATIENT
Start: 2018-09-13 | End: 2018-10-11 | Stop reason: SDUPTHER

## 2018-09-13 RX ORDER — LOVASTATIN 20 MG/1
20 TABLET ORAL DAILY
Qty: 90 TABLET | Refills: 0 | Status: SHIPPED | OUTPATIENT
Start: 2018-09-13 | End: 2018-11-20 | Stop reason: SDUPTHER

## 2018-09-18 DIAGNOSIS — I10 ESSENTIAL HYPERTENSION: ICD-10-CM

## 2018-09-18 DIAGNOSIS — C50.911 MALIGNANT NEOPLASM OF RIGHT FEMALE BREAST, UNSPECIFIED ESTROGEN RECEPTOR STATUS, UNSPECIFIED SITE OF BREAST: Primary | ICD-10-CM

## 2018-09-18 RX ORDER — GLIPIZIDE 10 MG/1
TABLET, FILM COATED, EXTENDED RELEASE ORAL
Qty: 180 TABLET | Refills: 0 | Status: SHIPPED | OUTPATIENT
Start: 2018-09-18 | End: 2018-10-11 | Stop reason: SDUPTHER

## 2018-09-18 RX ORDER — HYDROCHLOROTHIAZIDE 25 MG/1
25 TABLET ORAL DAILY
Qty: 90 TABLET | Refills: 0 | Status: SHIPPED | OUTPATIENT
Start: 2018-09-18 | End: 2018-10-11 | Stop reason: SDUPTHER

## 2018-09-18 RX ORDER — ANASTROZOLE 1 MG/1
1 TABLET ORAL EVERY MORNING
Qty: 90 TABLET | Refills: 1 | Status: SHIPPED | OUTPATIENT
Start: 2018-09-18 | End: 2019-06-24

## 2018-09-18 NOTE — TELEPHONE ENCOUNTER
----- Message from Shauna Botello sent at 9/18/2018  9:59 AM CDT -----  Contact: [facility] [place]   Calling for two medications that were missing. Please call at [phone]  Fax: [phone].    glipiZIDE (GLUCOTROL) 10 MG TR24  hydroCHLOROthiazide (HYDRODIURIL) 25 MG tablet      [facility]

## 2018-10-11 DIAGNOSIS — I10 ESSENTIAL HYPERTENSION: ICD-10-CM

## 2018-10-11 DIAGNOSIS — E89.0 POSTOPERATIVE HYPOTHYROIDISM: ICD-10-CM

## 2018-10-11 RX ORDER — LEVOTHYROXINE SODIUM 125 UG/1
125 TABLET ORAL
Qty: 90 TABLET | Refills: 0 | Status: SHIPPED | OUTPATIENT
Start: 2018-10-11 | End: 2019-01-10 | Stop reason: SDUPTHER

## 2018-10-11 RX ORDER — GLIPIZIDE 10 MG/1
TABLET, FILM COATED, EXTENDED RELEASE ORAL
Qty: 180 TABLET | Refills: 0 | Status: SHIPPED | OUTPATIENT
Start: 2018-10-11 | End: 2019-12-17 | Stop reason: SDUPTHER

## 2018-10-11 RX ORDER — HYDROCHLOROTHIAZIDE 25 MG/1
25 TABLET ORAL DAILY
Qty: 90 TABLET | Refills: 0 | Status: SHIPPED | OUTPATIENT
Start: 2018-10-11 | End: 2019-01-19 | Stop reason: SDUPTHER

## 2018-10-23 ENCOUNTER — LAB VISIT (OUTPATIENT)
Dept: LAB | Facility: HOSPITAL | Age: 63
End: 2018-10-23
Attending: INTERNAL MEDICINE
Payer: MEDICARE

## 2018-10-23 DIAGNOSIS — D64.9 ANEMIA, UNSPECIFIED TYPE: ICD-10-CM

## 2018-10-23 DIAGNOSIS — Z17.0 MALIGNANT NEOPLASM OF RIGHT BREAST IN FEMALE, ESTROGEN RECEPTOR POSITIVE, UNSPECIFIED SITE OF BREAST: ICD-10-CM

## 2018-10-23 DIAGNOSIS — C50.911 MALIGNANT NEOPLASM OF RIGHT BREAST IN FEMALE, ESTROGEN RECEPTOR POSITIVE, UNSPECIFIED SITE OF BREAST: ICD-10-CM

## 2018-10-23 LAB
ALBUMIN SERPL BCP-MCNC: 3.2 G/DL
ALP SERPL-CCNC: 76 U/L
ALT SERPL W/O P-5'-P-CCNC: 20 U/L
ANION GAP SERPL CALC-SCNC: 8 MMOL/L
AST SERPL-CCNC: 23 U/L
BASOPHILS # BLD AUTO: 0.03 K/UL
BASOPHILS NFR BLD: 0.2 %
BILIRUB SERPL-MCNC: 0.5 MG/DL
BUN SERPL-MCNC: 8 MG/DL
CALCIUM SERPL-MCNC: 9.7 MG/DL
CHLORIDE SERPL-SCNC: 105 MMOL/L
CO2 SERPL-SCNC: 29 MMOL/L
CREAT SERPL-MCNC: 0.8 MG/DL
DIFFERENTIAL METHOD: ABNORMAL
EOSINOPHIL # BLD AUTO: 0.2 K/UL
EOSINOPHIL NFR BLD: 1.3 %
ERYTHROCYTE [DISTWIDTH] IN BLOOD BY AUTOMATED COUNT: 14.4 %
EST. GFR  (AFRICAN AMERICAN): >60 ML/MIN/1.73 M^2
EST. GFR  (NON AFRICAN AMERICAN): >60 ML/MIN/1.73 M^2
GLUCOSE SERPL-MCNC: 156 MG/DL
HCT VFR BLD AUTO: 32.3 %
HGB BLD-MCNC: 10.7 G/DL
IRON SERPL-MCNC: 42 UG/DL
LYMPHOCYTES # BLD AUTO: 3.6 K/UL
LYMPHOCYTES NFR BLD: 28.5 %
MCH RBC QN AUTO: 27.6 PG
MCHC RBC AUTO-ENTMCNC: 33.1 G/DL
MCV RBC AUTO: 84 FL
MONOCYTES # BLD AUTO: 0.6 K/UL
MONOCYTES NFR BLD: 4.3 %
NEUTROPHILS # BLD AUTO: 8.4 K/UL
NEUTROPHILS NFR BLD: 65.7 %
PLATELET # BLD AUTO: 301 K/UL
PMV BLD AUTO: 10.6 FL
POTASSIUM SERPL-SCNC: 3.4 MMOL/L
PROT SERPL-MCNC: 7.1 G/DL
RBC # BLD AUTO: 3.87 M/UL
SATURATED IRON: 15 %
SODIUM SERPL-SCNC: 142 MMOL/L
TOTAL IRON BINDING CAPACITY: 287 UG/DL
TRANSFERRIN SERPL-MCNC: 194 MG/DL
WBC # BLD AUTO: 12.79 K/UL

## 2018-10-23 PROCEDURE — 85025 COMPLETE CBC W/AUTO DIFF WBC: CPT | Mod: PO

## 2018-10-23 PROCEDURE — 36415 COLL VENOUS BLD VENIPUNCTURE: CPT | Mod: PO

## 2018-10-23 PROCEDURE — 83540 ASSAY OF IRON: CPT

## 2018-10-23 PROCEDURE — 80053 COMPREHEN METABOLIC PANEL: CPT

## 2018-10-23 NOTE — PROGRESS NOTES
Chief Complaint :   Breast Cancer    Hx of Present illness :  Patient is a 63 y.o. year old female who presents to the clinic  for oncology followup. dx'd to have Carcinoma Right Breast  In 2013. Lumpectomy and radiation therapy followed by adjuvant Arimidex therapy. Breast cancer Index did not show  Any benefit with extended harmonal therapy. Will complete five years of Arimidex in 2018. Feels well. In good spirits. Mild hot flashes; no vaginal bleeding or discharge. Mild pain knees at night. No overt bleeding. No fever, cough, dyspnea . Appetite good. No Urinary sx. Mild diarrhoea.  Persistent sinus drip.  Saw  for GI symptoms.   Had EGD.    Allergies :    Review of patient's allergies indicates:   Allergen Reactions    Ace inhibitors Other (See Comments)     cough    Penicillins Hives       Occupation : retired .    Transfusion :  None    Menstrual & obstetric Hx :  3; para 3.  Age of menarche: 12  Age of first pregnancy: 18  Lactation history: None  Age of menopause:  Hysterectomy in her late 40's for bleeding  HRT:  For many years.    Present Meds :      Medication List           Accurate as of 10/23/18  8:06 AM. If you have any questions, ask your nurse or doctor.               CONTINUE taking these medications    anastrozole 1 mg Tab  Commonly known as:  ARIMIDEX  Take 1 tablet (1 mg total) by mouth every morning.     CALCIUM 600 + D(3) 600 mg calcium- 200 unit Cap  Generic drug:  calcium carbonate-vitamin D3     glipiZIDE 10 MG Tr24  Commonly known as:  GLUCOTROL  TAKE ONE TABLET BY MOUTH TWICE DAILY     hydroCHLOROthiazide 25 MG tablet  Commonly known as:  HYDRODIURIL  Take 1 tablet (25 mg total) by mouth once daily.     levothyroxine 125 MCG tablet  Commonly known as:  SYNTHROID  Take 1 tablet (125 mcg total) by mouth before breakfast.     loratadine 10 mg tablet  Commonly known as:  CLARITIN     losartan 25 MG tablet  Commonly known as:  COZAAR  Take 1 tablet (25 mg total)  by mouth once daily.     lovastatin 20 MG tablet  Commonly known as:  MEVACOR  Take 1 tablet (20 mg total) by mouth once daily.     metFORMIN 1000 MG tablet  Commonly known as:  GLUCOPHAGE  Take 1 tablet (1,000 mg total) by mouth 2 (two) times daily.     oxyCODONE-acetaminophen 5-325 mg per tablet  Commonly known as:  PERCOCET  Take 1 tablet by mouth every 4 (four) hours as needed for Pain.     TRADJENTA 5 mg Tab tablet  Generic drug:  linagliptin  Take 1 tablet (5 mg total) by mouth once daily.            Past Medical Hx :  Carcinoma right breast. Diabetes Mellitus type 2; Hypertension; hyperlipidemia; Allergic rhinitis; osteopenia; sickle cell trait; Thyroid nodule. No Hx of hepatitis, TB, Sarcoid, Lupus, rheumatoid arthritis, seizure disorder, CVA, PE or DVT. No prior Hx of Chemotherapy    Past Medical Hx :  Past Medical History:   Diagnosis Date    AR (allergic rhinitis)     Breast cancer 06/2013    right; followed by oncology, Dr. Oliveira; s/p lumpectomy and  radiation    History of colonic polyps     Hyperlipidemia LDL goal < 100     Hypertension     Lower extremity edema     Mild chronic anemia     Obesity     Osteopenia     Sickle cell trait     Thyroid nodule     Type II or unspecified type diabetes mellitus without mention of complication, uncontrolled     Vitamin D deficiency disease        Travel Hx :  N/A    Immunization :  Immunization History   Administered Date(s) Administered    Influenza - Quadrivalent 10/08/2014    Influenza - Quadrivalent - PF 09/28/2016, 12/06/2017    Influenza Split 11/26/2012, 01/10/2014    Pneumococcal Conjugate - 13 Valent 05/04/2016    Pneumococcal Polysaccharide - 23 Valent 08/13/2008, 12/06/2017    Tdap 07/24/2009       Family Hx : reviewed  Family History   Problem Relation Age of Onset    Diabetes Mother     Hypertension Mother     Hypertension Father     Prostate cancer Father     Diabetes Brother     Cancer Neg Hx     Breast cancer Neg Hx      Colon cancer Neg Hx     Ovarian cancer Neg Hx     Amblyopia Neg Hx     Blindness Neg Hx     Cataracts Neg Hx     Glaucoma Neg Hx     Macular degeneration Neg Hx     Retinal detachment Neg Hx     Strabismus Neg Hx     Stroke Neg Hx     Thyroid disease Neg Hx        Social Hx : Reviewed  Social History     Socioeconomic History    Marital status:      Spouse name: Not on file    Number of children: 3    Years of education: Not on file    Highest education level: Not on file   Social Needs    Financial resource strain: Not on file    Food insecurity - worry: Not on file    Food insecurity - inability: Not on file    Transportation needs - medical: Not on file    Transportation needs - non-medical: Not on file   Occupational History    Occupation: [employer]   Tobacco Use    Smoking status: Never Smoker    Smokeless tobacco: Never Used   Substance and Sexual Activity    Alcohol use: No    Drug use: Not on file    Sexual activity: Not on file   Other Topics Concern    Not on file   Social History Narrative    Not on file       Surgery :  Right Breast Bx; Lumpectomy Right; Tubal ligation; Complete Hysterectomy; Thyroidectomy.  Colonoscopy in 2014.  EGD in  Aug 2018    Symptoms :    Review of Systems   Constitutional: Positive for malaise/fatigue. Negative for chills, diaphoresis, fever and weight loss.   HENT: Positive for congestion. Negative for ear discharge, ear pain, hearing loss, nosebleeds, sinus pain, sore throat and tinnitus.    Eyes: Negative for blurred vision, double vision, photophobia, pain, discharge and redness.   Respiratory: Negative for cough, hemoptysis, sputum production, shortness of breath, wheezing and stridor.    Cardiovascular: Negative for chest pain, palpitations, orthopnea, claudication, leg swelling and PND.   Gastrointestinal: Positive for diarrhea (Mild). Negative for abdominal pain, blood in stool, constipation, heartburn, nausea and vomiting.   Genitourinary:  Negative for dysuria, flank pain, frequency, hematuria and urgency.   Musculoskeletal: Positive for joint pain. Negative for back pain, falls, myalgias and neck pain.   Skin: Negative for itching and rash.   Neurological: Negative for dizziness, tingling, tremors, sensory change, speech change, focal weakness, seizures, loss of consciousness, weakness and headaches.   Endo/Heme/Allergies: Negative for polydipsia. Does not bruise/bleed easily.   Psychiatric/Behavioral: Negative for depression, hallucinations, memory loss, substance abuse and suicidal ideas. The patient is nervous/anxious (Sometimes) and has insomnia.        Physical Exam :  Nurse Ana present in the room  Physical Exam   Constitutional: She is oriented to person, place, and time and well-developed, well-nourished, and in no distress. No distress.   HENT:   Head: Normocephalic and atraumatic.   Right Ear: External ear normal.   Left Ear: External ear normal.   Nose: Nose normal.   Mouth/Throat: Oropharynx is clear and moist. No oropharyngeal exudate.   Eyes: Conjunctivae and EOM are normal. Pupils are equal, round, and reactive to light. Right eye exhibits no discharge. Left eye exhibits no discharge. No scleral icterus.   Neck: Normal range of motion and full passive range of motion without pain. Neck supple.       Cardiovascular: Normal rate, regular rhythm, S1 normal, normal heart sounds, intact distal pulses and normal pulses. PMI is not displaced.   Pulmonary/Chest: Breath sounds normal. No apnea. Right breast exhibits no inverted nipple, no mass, no nipple discharge, no skin change and no tenderness. Left breast exhibits no inverted nipple, no mass, no nipple discharge, no skin change and no tenderness.       Abdominal: Soft. Normal appearance, normal aorta and bowel sounds are normal. She exhibits no distension, no ascites and no mass. There is no hepatosplenomegaly, splenomegaly or hepatomegaly. There is no tenderness. There is no rebound  and no CVA tenderness. No hernia.   Genitourinary:   Genitourinary Comments: Not Examined   Musculoskeletal: Normal range of motion.   Lymphadenopathy:        Head (right side): No submental, no submandibular, no tonsillar, no preauricular, no posterior auricular and no occipital adenopathy present.        Head (left side): No submental, no submandibular, no tonsillar, no preauricular, no posterior auricular and no occipital adenopathy present.     She has no cervical adenopathy.     She has no axillary adenopathy.        Right: No inguinal, no supraclavicular and no epitrochlear adenopathy present.        Left: No inguinal, no supraclavicular and no epitrochlear adenopathy present.   Neurological: She is alert and oriented to person, place, and time. She has normal motor skills, normal sensation, normal strength, normal reflexes and intact cranial nerves. Gait normal. GCS score is 15.   Skin: Skin is warm, dry and intact. No rash noted. She is not diaphoretic. No cyanosis. No pallor. Nails show no clubbing.   Psychiatric: Mood, memory, affect and judgment normal.         Labs & Imaging :  04/23/18 : NFBS 90; Cr. 0.7 Bili 0.2 Ca 9.3 ALP 71. Hgb 10; Hct 32.4. Plts 276,000 ANC 4,900  07/23/18 :  Serum Iron 55; TIBC  303; sat 18 %; NFBS 171; Cr.0.8; eGFR >60; Ca 9.8; Bili 0.6;  ALP 74.  Hgb 10.8;  Hct 31.4; MCV 83; Plts 278,000 ANC 6,800.    10/23/18  : Serum iron 42; TIBC 287; sat 15 %.  NFBS 156; Cr. 0.8; Ca 9.7 bili 0.5 ALP 76. Hgb 10.7; Hct 32.3; MCV 84. Plts 301,000 ANC 8,600    Dx :  Carcinoma Right breast in remission.   Gastritis. Gastric polyp removed.      Assessment & Plan:  Reviewed with patient.  had done Bone Density studies in Dec 2017. Continue Arimidex and Fosamax. OTC Iron One daily.  Endocrine followup with Dr.Amy Dubose.  Routine Medical followup with . GI followup with    Complete   Arimidex  Therapy in dec 2018. Oncology followup in 4.  months with CBC,CMP,Iron  Study.

## 2018-10-25 ENCOUNTER — OFFICE VISIT (OUTPATIENT)
Dept: HEMATOLOGY/ONCOLOGY | Facility: CLINIC | Age: 63
End: 2018-10-25
Payer: MEDICARE

## 2018-10-25 VITALS
SYSTOLIC BLOOD PRESSURE: 143 MMHG | TEMPERATURE: 98 F | DIASTOLIC BLOOD PRESSURE: 69 MMHG | HEIGHT: 67 IN | HEART RATE: 75 BPM | OXYGEN SATURATION: 97 % | WEIGHT: 229.19 LBS | BODY MASS INDEX: 35.97 KG/M2

## 2018-10-25 DIAGNOSIS — Z17.0 MALIGNANT NEOPLASM OF RIGHT BREAST IN FEMALE, ESTROGEN RECEPTOR POSITIVE, UNSPECIFIED SITE OF BREAST: Primary | ICD-10-CM

## 2018-10-25 DIAGNOSIS — C50.911 MALIGNANT NEOPLASM OF RIGHT BREAST IN FEMALE, ESTROGEN RECEPTOR POSITIVE, UNSPECIFIED SITE OF BREAST: Primary | ICD-10-CM

## 2018-10-25 DIAGNOSIS — D64.9 ANEMIA, UNSPECIFIED TYPE: ICD-10-CM

## 2018-10-25 PROCEDURE — 99213 OFFICE O/P EST LOW 20 MIN: CPT | Mod: S$PBB,,, | Performed by: INTERNAL MEDICINE

## 2018-10-25 PROCEDURE — 3008F BODY MASS INDEX DOCD: CPT | Mod: CPTII,,, | Performed by: INTERNAL MEDICINE

## 2018-10-25 PROCEDURE — 99999 PR PBB SHADOW E&M-EST. PATIENT-LVL III: CPT | Mod: PBBFAC,,, | Performed by: INTERNAL MEDICINE

## 2018-10-25 PROCEDURE — 3078F DIAST BP <80 MM HG: CPT | Mod: CPTII,,, | Performed by: INTERNAL MEDICINE

## 2018-10-25 PROCEDURE — 3077F SYST BP >= 140 MM HG: CPT | Mod: CPTII,,, | Performed by: INTERNAL MEDICINE

## 2018-10-25 PROCEDURE — 99213 OFFICE O/P EST LOW 20 MIN: CPT | Mod: PBBFAC | Performed by: INTERNAL MEDICINE

## 2018-11-20 DIAGNOSIS — I10 ESSENTIAL HYPERTENSION: ICD-10-CM

## 2018-11-20 DIAGNOSIS — E78.5 HYPERLIPIDEMIA WITH TARGET LDL LESS THAN 100: ICD-10-CM

## 2018-11-20 RX ORDER — METFORMIN HYDROCHLORIDE 1000 MG/1
TABLET ORAL
Qty: 180 TABLET | Refills: 0 | Status: SHIPPED | OUTPATIENT
Start: 2018-11-20 | End: 2019-01-25 | Stop reason: SDUPTHER

## 2018-11-20 RX ORDER — LOVASTATIN 20 MG/1
TABLET ORAL
Qty: 90 TABLET | Refills: 0 | Status: SHIPPED | OUTPATIENT
Start: 2018-11-20 | End: 2019-01-25 | Stop reason: SDUPTHER

## 2018-11-20 RX ORDER — LOSARTAN POTASSIUM 25 MG/1
TABLET ORAL
Qty: 90 TABLET | Refills: 0 | Status: SHIPPED | OUTPATIENT
Start: 2018-11-20 | End: 2019-01-25 | Stop reason: SDUPTHER

## 2018-12-12 ENCOUNTER — OFFICE VISIT (OUTPATIENT)
Dept: FAMILY MEDICINE | Facility: CLINIC | Age: 63
End: 2018-12-12
Payer: MEDICARE

## 2018-12-12 ENCOUNTER — LAB VISIT (OUTPATIENT)
Dept: LAB | Facility: HOSPITAL | Age: 63
End: 2018-12-12
Payer: MEDICARE

## 2018-12-12 VITALS
OXYGEN SATURATION: 97 % | HEIGHT: 67 IN | TEMPERATURE: 98 F | HEART RATE: 69 BPM | SYSTOLIC BLOOD PRESSURE: 118 MMHG | BODY MASS INDEX: 36.25 KG/M2 | WEIGHT: 230.94 LBS | DIASTOLIC BLOOD PRESSURE: 68 MMHG

## 2018-12-12 DIAGNOSIS — Z23 NEED FOR INFLUENZA VACCINATION: Primary | ICD-10-CM

## 2018-12-12 DIAGNOSIS — E66.01 SEVERE OBESITY (BMI 35.0-35.9 WITH COMORBIDITY): ICD-10-CM

## 2018-12-12 DIAGNOSIS — Z79.811 USE OF AROMATASE INHIBITORS: ICD-10-CM

## 2018-12-12 DIAGNOSIS — E78.5 HYPERLIPIDEMIA WITH TARGET LDL LESS THAN 100: ICD-10-CM

## 2018-12-12 DIAGNOSIS — L72.0 EPIDERMOID CYST OF FINGER: ICD-10-CM

## 2018-12-12 DIAGNOSIS — D64.9 ANEMIA, UNSPECIFIED TYPE: ICD-10-CM

## 2018-12-12 DIAGNOSIS — M85.89 OSTEOPENIA OF MULTIPLE SITES: ICD-10-CM

## 2018-12-12 DIAGNOSIS — C50.511 MALIGNANT NEOPLASM OF LOWER-OUTER QUADRANT OF RIGHT FEMALE BREAST, UNSPECIFIED ESTROGEN RECEPTOR STATUS: ICD-10-CM

## 2018-12-12 DIAGNOSIS — I10 ESSENTIAL HYPERTENSION: ICD-10-CM

## 2018-12-12 DIAGNOSIS — E89.0 HYPOTHYROIDISM, POSTSURGICAL: ICD-10-CM

## 2018-12-12 DIAGNOSIS — Z86.010 HISTORY OF COLONIC POLYPS: ICD-10-CM

## 2018-12-12 LAB
ESTIMATED AVG GLUCOSE: 171 MG/DL
HBA1C MFR BLD HPLC: 7.6 %

## 2018-12-12 PROCEDURE — 83036 HEMOGLOBIN GLYCOSYLATED A1C: CPT | Mod: HCNC

## 2018-12-12 PROCEDURE — 3074F SYST BP LT 130 MM HG: CPT | Mod: CPTII,HCNC,S$GLB, | Performed by: NURSE PRACTITIONER

## 2018-12-12 PROCEDURE — G0008 ADMIN INFLUENZA VIRUS VAC: HCPCS | Mod: HCNC,S$GLB,, | Performed by: NURSE PRACTITIONER

## 2018-12-12 PROCEDURE — 3008F BODY MASS INDEX DOCD: CPT | Mod: CPTII,HCNC,S$GLB, | Performed by: NURSE PRACTITIONER

## 2018-12-12 PROCEDURE — 99214 OFFICE O/P EST MOD 30 MIN: CPT | Mod: HCNC,25,S$GLB, | Performed by: NURSE PRACTITIONER

## 2018-12-12 PROCEDURE — 3078F DIAST BP <80 MM HG: CPT | Mod: CPTII,HCNC,S$GLB, | Performed by: NURSE PRACTITIONER

## 2018-12-12 PROCEDURE — 3045F PR MOST RECENT HEMOGLOBIN A1C LEVEL 7.0-9.0%: CPT | Mod: CPTII,HCNC,S$GLB, | Performed by: NURSE PRACTITIONER

## 2018-12-12 PROCEDURE — 36415 COLL VENOUS BLD VENIPUNCTURE: CPT | Mod: HCNC,PO

## 2018-12-12 PROCEDURE — 90686 IIV4 VACC NO PRSV 0.5 ML IM: CPT | Mod: HCNC,S$GLB,, | Performed by: NURSE PRACTITIONER

## 2018-12-12 PROCEDURE — 99999 PR PBB SHADOW E&M-EST. PATIENT-LVL IV: CPT | Mod: PBBFAC,HCNC,, | Performed by: NURSE PRACTITIONER

## 2018-12-12 NOTE — PROGRESS NOTES
Subjective:       Patient ID: Masha Garcia is a 63 y.o. female.    Chief Complaint: Follow-up and Mass (on right hand)    63-year-old female presents to the clinic today for follow-up on diabetes and hypertension.  She says she has fair dietary habits.  She does not exercise take 3 times a week for 30 minutes at a time.  She is compliant with all of her medications.  She denies any headaches, dizziness, or blurred vision.  She denies any cardiac chest pain, heart palpitations, shortness breath, or swelling to lower extremities.  She has a cyst to her right middle finger over the DIP joint which she states is getting bigger and more tender which she would like referral to have it removed.      Past Medical History:   Diagnosis Date    AR (allergic rhinitis)     Breast cancer 06/2013    right; followed by oncology, Dr. Oliveira; s/p lumpectomy and  radiation    History of colonic polyps     Hyperlipidemia LDL goal < 100     Hypertension     Lower extremity edema     Mild chronic anemia     Obesity     Osteopenia     Sickle cell trait     Thyroid nodule     Type II or unspecified type diabetes mellitus without mention of complication, uncontrolled     Vitamin D deficiency disease      Past Surgical History:   Procedure Laterality Date    benign right breast biopsy  2012    COLONOSCOPY N/A 2/18/2013    [facility] at [facility]    lumpectomy right breast  6/2013    REIMPLANTATION-PARATHYROID TISSUE  12/28/2016    Performed [facility] 2ND [facility]    right ankle fracture repair      THYROIDECTOMY total Bilateral 12/28/2016    [facility] at [facility] 2ND FLR    TOTAL ABDOMINAL HYSTERECTOMY      TOTAL THYROIDECTOMY  12/28/2016      reports that  has never smoked. she has never used smokeless tobacco. She reports that she does not drink alcohol.  Review of Systems   Constitutional: Negative for activity change.   Respiratory: Negative  for cough, chest tightness, shortness of breath and wheezing.    Cardiovascular: Negative for chest pain, palpitations and leg swelling.   Gastrointestinal: Negative for abdominal pain, constipation, diarrhea, nausea and vomiting.   Musculoskeletal: Negative for gait problem.   Skin: Negative for color change.        Cyst noted right middle finger over DIP joint    Neurological: Negative for dizziness, syncope and light-headedness.       Objective:      Physical Exam   Constitutional: She is oriented to person, place, and time. She appears well-developed and well-nourished. No distress.   Eyes: Conjunctivae and EOM are normal. Pupils are equal, round, and reactive to light. Right eye exhibits no discharge. Left eye exhibits no discharge. No scleral icterus.   Neck: Normal range of motion. Neck supple. No JVD present.   Cardiovascular: Normal rate, regular rhythm and normal heart sounds.   No murmur heard.  Pulmonary/Chest: Effort normal and breath sounds normal. No respiratory distress. She has no wheezes. She has no rales.   Abdominal: Soft. Bowel sounds are normal. There is no tenderness.   Musculoskeletal: Normal range of motion. She exhibits no edema.   Protective Sensation (w/ 10 gram monofilament):  Right: Intact  Left: Intact    Visual Inspection:  Dry Skin -  Bilateral    Pedal Pulses:   Right: Present  Left: Present    Posterior tibialis:   Right:Present  Left: Present     Neurological: She is alert and oriented to person, place, and time.   Skin: Skin is warm and dry. She is not diaphoretic.   Cyst noted over right middle finger DIP joint tender to touch    Psychiatric: She has a normal mood and affect.       Assessment:       1. Need for influenza vaccination    2. Anemia, unspecified type    3. Malignant neoplasm of lower-outer quadrant of right female breast, unspecified estrogen receptor status    4. History of colonic polyps    5. Hyperlipidemia with target LDL less than 100    6. Essential  hypertension    7. Hypothyroidism, postsurgical    8. Osteopenia of multiple sites    9. Severe obesity (BMI 35.0-35.9 with comorbidity)    10. Uncontrolled type 2 diabetes mellitus without complication    11. Use of aromatase inhibitors    12. Epidermoid cyst of finger        Plan:         Need for influenza vaccination  -     Influenza - Quadrivalent (3 years & older) (PF)    Anemia, unspecified type  - stable continue to monitor     Malignant neoplasm of lower-outer quadrant of right female breast, unspecified estrogen receptor status  - continue Arimidex  - followed by oncology     History of colonic polyps  - UTD on colonoscopy     Hyperlipidemia with target LDL less than 100  - The current medical regimen is effective;  continue present plan and medications.    Essential hypertension  - The current medical regimen is effective;  continue present plan and medications.    Hypothyroidism, postsurgical  - The current medical regimen is effective;  continue present plan and medications.    Osteopenia of multiple sites  - currently not on any medication     Severe obesity (BMI 35.0-35.9 with comorbidity)  - The current medical regimen is effective;  continue present plan and medications.    Uncontrolled type 2 diabetes mellitus without complication  -     Hemoglobin A1c; Future; Expected date: 12/12/2018  - continue current medication will address accordingly pending lab results     Use of aromatase inhibitors  - The current medical regimen is effective;  continue present plan and medications    Epidermoid cyst of finger  -     Ambulatory referral to Orthopedics

## 2018-12-12 NOTE — PATIENT INSTRUCTIONS
Flu shot today   Discuss Shingles vaccine with pharmacy   Check HgB A1C  Continue watching diet closely and exercising   Follow up with Dr. Bailey in 6 months

## 2018-12-13 ENCOUNTER — TELEPHONE (OUTPATIENT)
Dept: FAMILY MEDICINE | Facility: CLINIC | Age: 63
End: 2018-12-13

## 2018-12-13 NOTE — TELEPHONE ENCOUNTER
I spoke with the patient and explained that her Hgb A1C was 7.6 which was increased some from 6 months ago. She said she has not been watching her diet as closely as she had in the past. She has been taking all of her medications as prescribed. She will work harder on her diet.I told her to follow up in 6 months for a check up. Patient verbalized understanding of above.

## 2019-01-08 ENCOUNTER — OFFICE VISIT (OUTPATIENT)
Dept: ORTHOPEDICS | Facility: CLINIC | Age: 64
End: 2019-01-08
Payer: MEDICARE

## 2019-01-08 VITALS
HEIGHT: 67 IN | DIASTOLIC BLOOD PRESSURE: 80 MMHG | WEIGHT: 231.5 LBS | HEART RATE: 70 BPM | BODY MASS INDEX: 36.34 KG/M2 | SYSTOLIC BLOOD PRESSURE: 122 MMHG

## 2019-01-08 DIAGNOSIS — M67.441 GANGLION CYST OF FINGER OF RIGHT HAND: ICD-10-CM

## 2019-01-08 DIAGNOSIS — R22.31 MASS OF FINGER OF RIGHT HAND: Primary | ICD-10-CM

## 2019-01-08 PROCEDURE — 3074F SYST BP LT 130 MM HG: CPT | Mod: CPTII,HCNC,S$GLB, | Performed by: ORTHOPAEDIC SURGERY

## 2019-01-08 PROCEDURE — 99999 PR PBB SHADOW E&M-EST. PATIENT-LVL III: CPT | Mod: PBBFAC,HCNC,, | Performed by: ORTHOPAEDIC SURGERY

## 2019-01-08 PROCEDURE — 99203 PR OFFICE/OUTPT VISIT, NEW, LEVL III, 30-44 MIN: ICD-10-PCS | Mod: HCNC,S$GLB,, | Performed by: ORTHOPAEDIC SURGERY

## 2019-01-08 PROCEDURE — 3008F BODY MASS INDEX DOCD: CPT | Mod: CPTII,HCNC,S$GLB, | Performed by: ORTHOPAEDIC SURGERY

## 2019-01-08 PROCEDURE — 3079F PR MOST RECENT DIASTOLIC BLOOD PRESSURE 80-89 MM HG: ICD-10-PCS | Mod: CPTII,HCNC,S$GLB, | Performed by: ORTHOPAEDIC SURGERY

## 2019-01-08 PROCEDURE — 3008F PR BODY MASS INDEX (BMI) DOCUMENTED: ICD-10-PCS | Mod: CPTII,HCNC,S$GLB, | Performed by: ORTHOPAEDIC SURGERY

## 2019-01-08 PROCEDURE — 99999 PR PBB SHADOW E&M-EST. PATIENT-LVL III: ICD-10-PCS | Mod: PBBFAC,HCNC,, | Performed by: ORTHOPAEDIC SURGERY

## 2019-01-08 PROCEDURE — 99203 OFFICE O/P NEW LOW 30 MIN: CPT | Mod: HCNC,S$GLB,, | Performed by: ORTHOPAEDIC SURGERY

## 2019-01-08 PROCEDURE — 3079F DIAST BP 80-89 MM HG: CPT | Mod: CPTII,HCNC,S$GLB, | Performed by: ORTHOPAEDIC SURGERY

## 2019-01-08 PROCEDURE — 3074F PR MOST RECENT SYSTOLIC BLOOD PRESSURE < 130 MM HG: ICD-10-PCS | Mod: CPTII,HCNC,S$GLB, | Performed by: ORTHOPAEDIC SURGERY

## 2019-01-08 NOTE — PROGRESS NOTES
Chief Complaint   Patient presents with    Right Hand - Finger Pain       This patient was seen in consultation at the request of Davidson Díaz     HPI: Masha Garcia is a 63 y.o. female who presents today complaining of R long finger DIP ganglion cyst  Duration of symptoms:  Several years  Trauma or new activity: no  Not painful  Denies numbness/paresthesias in the hand   Associated symptoms:  none.    It has never drained or been infected   does not interfere with activities of daily living .    This is the extent of the patient's complaints at this time.     Hand dominance: Right     Review of Systems   All other systems reviewed and are negative.        Review of patient's allergies indicates:   Allergen Reactions    Fosamax [alendronate] Other (See Comments)     - 'funny feeling'    Ace inhibitors Other (See Comments)     cough    Penicillins Hives         Current Outpatient Medications:     anastrozole (ARIMIDEX) 1 mg Tab, Take 1 tablet (1 mg total) by mouth every morning., Disp: 90 tablet, Rfl: 1    calcium carbonate-vitamin D3 (CALCIUM 600 + D,3,) 600 mg calcium- 200 unit Cap, Take 1 tablet by mouth every morning. , Disp: , Rfl:     glipiZIDE (GLUCOTROL) 10 MG TR24, TAKE ONE TABLET BY MOUTH TWICE DAILY, Disp: 180 tablet, Rfl: 0    hydroCHLOROthiazide (HYDRODIURIL) 25 MG tablet, Take 1 tablet (25 mg total) by mouth once daily., Disp: 90 tablet, Rfl: 0    levothyroxine (SYNTHROID) 125 MCG tablet, Take 1 tablet (125 mcg total) by mouth before breakfast., Disp: 90 tablet, Rfl: 0    linagliptin (TRADJENTA) 5 mg Tab tablet, Take 1 tablet (5 mg total) by mouth once daily., Disp: 30 tablet, Rfl: 5    loratadine (CLARITIN) 10 mg tablet, Take 10 mg by mouth daily as needed. , Disp: , Rfl:     losartan (COZAAR) 25 MG tablet, TAKE 1 TABLET ONE TIME DAILY, Disp: 90 tablet, Rfl: 0    lovastatin (MEVACOR) 20 MG tablet, TAKE 1 TABLET ONE TIME DAILY, Disp: 90 tablet, Rfl: 0    metFORMIN (GLUCOPHAGE) 1000  MG tablet, TAKE 1 TABLET TWICE DAILY, Disp: 180 tablet, Rfl: 0    Past Medical History:   Diagnosis Date    AR (allergic rhinitis)     Breast cancer 06/2013    right; followed by oncology, Dr. Oliveira; s/p lumpectomy and  radiation    History of colonic polyps     Hyperlipidemia LDL goal < 100     Hypertension     Lower extremity edema     Mild chronic anemia     Obesity     Osteopenia     Sickle cell trait     Thyroid nodule     Type II or unspecified type diabetes mellitus without mention of complication, uncontrolled     Vitamin D deficiency disease        Patient Active Problem List   Diagnosis    Hypertension    Hyperlipidemia with target LDL less than 100    Uncontrolled type 2 diabetes mellitus without complication    Severe obesity (BMI 35.0-35.9 with comorbidity)    Osteopenia    Vitamin D deficiency disease    History of colonic polyps    Breast cancer    Sickle cell trait    Use of aromatase inhibitors    Hypothyroidism, postsurgical    Malignant neoplasm of breast in female, estrogen receptor positive    Anemia, unspecified    Anemia    Malignant neoplasm of right breast in female, estrogen receptor positive       Past Surgical History:   Procedure Laterality Date    benign right breast biopsy  2012    COLONOSCOPY N/A 2/18/2013    [facility]    lumpectomy right breast  6/2013    REIMPLANTATION-PARATHYROID TISSUE  12/28/2016    [facility] 2ND [facility]    right ankle fracture repair      THYROIDECTOMY total Bilateral 12/28/2016    [facility] 2ND [facility]    TOTAL ABDOMINAL HYSTERECTOMY      TOTAL THYROIDECTOMY  12/28/2016       Social History     Tobacco Use    Smoking status: Never Smoker    Smokeless tobacco: Never Used   Substance Use Topics    Alcohol use: No    Drug use: Not on file       Family History   Problem Relation Age of Onset    Diabetes Mother      Hypertension Mother     Hypertension Father     Prostate cancer Father     Diabetes Brother     Cancer Neg Hx     Breast cancer Neg Hx     Colon cancer Neg Hx     Ovarian cancer Neg Hx     Amblyopia Neg Hx     Blindness Neg Hx     Cataracts Neg Hx     Glaucoma Neg Hx     Macular degeneration Neg Hx     Retinal detachment Neg Hx     Strabismus Neg Hx     Stroke Neg Hx     Thyroid disease Neg Hx        Physical Exam:     Vitals:    01/08/19 1008   BP: 122/80   Pulse: 70         General: Weight: 105 kg (231 lb 7.7 oz) Body mass index is 36.26 kg/m².  Patient is alert, awake and oriented to time, place and person. Mood and affect are appropriate.  Patient does not appear to be in any distress, denies any constitutional symptoms and appears stated age.   HEENT: Pupils are equal and round, sclera are not injected. External examination of ears and nose reveals no abnormalities. Cranial nerves II-X are grossly intact  Skin: no rashes, abrasions or open wounds on the affected extremity   Resp: No respiratory distress or audible wheezing   CV: 2+  pulses, all extremities warm and well perfused   Right Hand    Synovial cyst over dorsal-radial aspect of long finger DIP  The skin over the top is very thin but intact  Mild pain with palpation of cyst   No pain with ROM of the long finger  ltsi m/u/r  2+ RP  + epl, io, fds, fdp     Imaging: 3 views Right hand: No significant arthritic changes. No acute changes.      Assessment: 63 y.o. female with right long finger DIP ganglion cyst     I explained my diagnostic impression and the reasoning behind it in detail, using layman's terms.  Models and/or pictures were used to help in the explanation.    Plan:   - Recommended observation since the cyst is asymptomatic. Aspiration carries risk of infection with high risk of recurrence.  She is not interested in surgical treatment.   - May use off the shelf finger sleeve to keep it from getting bumped/irritated  - Return to  clinic as needed    All questions were answered in detail. The patient is in full agreement with the treatment plan and will proceed accordingly.    A note notifying Davidson Díaz of my findings was sent via the electronic medical record

## 2019-01-08 NOTE — LETTER
January 8, 2019      Davidson Díaz, FNP-C  [address] [facility] [place] [address]           [facility]  [address] [facility] [address]  [facility] [address]  Phone: [phone]          Patient: Masha Garcia   MR Number: [id number]   YOB: 1955   Date of Visit: 1/8/2019       Dear Davidson Díaz:    Thank you for referring Masha Garcia to me for evaluation. Attached you will find relevant portions of my assessment and plan of care.    If you have questions, please do not hesitate to call me. I look forward to following Masha Garcia along with you.    Sincerely,    Rachel Enrique MD    Enclosure  CC:  No Recipients    If you would like to receive this communication electronically, please contact [email or web address] or [phone] to request more information on [employer] Link access.    For providers and/or their staff who would like to refer a patient to [facility], please contact us through our one-stop-shop provider referral line, [facility] , at [phone].    If you feel you have received this communication in error or would no longer like to receive these types of communications, please e-mail [email or web address]

## 2019-01-09 ENCOUNTER — LAB VISIT (OUTPATIENT)
Dept: LAB | Facility: HOSPITAL | Age: 64
End: 2019-01-09
Attending: INTERNAL MEDICINE
Payer: MEDICARE

## 2019-01-09 DIAGNOSIS — E89.0 POSTOPERATIVE HYPOTHYROIDISM: ICD-10-CM

## 2019-01-09 LAB
T3 SERPL-MCNC: 85 NG/DL
T4 FREE SERPL-MCNC: 1.33 NG/DL
TSH SERPL DL<=0.005 MIU/L-ACNC: 0.38 UIU/ML

## 2019-01-09 PROCEDURE — 36415 COLL VENOUS BLD VENIPUNCTURE: CPT | Mod: HCNC,PO

## 2019-01-09 PROCEDURE — 84439 ASSAY OF FREE THYROXINE: CPT | Mod: HCNC

## 2019-01-09 PROCEDURE — 84443 ASSAY THYROID STIM HORMONE: CPT | Mod: HCNC

## 2019-01-09 PROCEDURE — 84480 ASSAY TRIIODOTHYRONINE (T3): CPT | Mod: HCNC

## 2019-01-11 DIAGNOSIS — E11.9 CONTROLLED TYPE 2 DIABETES MELLITUS WITHOUT COMPLICATION, WITHOUT LONG-TERM CURRENT USE OF INSULIN: ICD-10-CM

## 2019-01-19 DIAGNOSIS — I10 ESSENTIAL HYPERTENSION: ICD-10-CM

## 2019-01-19 NOTE — LETTER
January 31, 2019    Masha Garcia  [address] [place] [address]             [facility]  [address]  [facility] [address]  Phone: [phone] Dear Ms. Garcia:    Please contact  office to schedule a follow up appointment.  (May 2019)    If you have any questions or concerns, please don't hesitate to call.    Sincerely,        Iraida Dennis MA

## 2019-01-20 RX ORDER — HYDROCHLOROTHIAZIDE 25 MG/1
TABLET ORAL
Qty: 90 TABLET | Refills: 0 | Status: SHIPPED | OUTPATIENT
Start: 2019-01-20 | End: 2019-03-25 | Stop reason: SDUPTHER

## 2019-01-25 ENCOUNTER — LAB VISIT (OUTPATIENT)
Dept: LAB | Facility: HOSPITAL | Age: 64
End: 2019-01-25
Attending: NURSE PRACTITIONER
Payer: MEDICARE

## 2019-01-25 DIAGNOSIS — E11.9 DM (DIABETES MELLITUS): ICD-10-CM

## 2019-01-25 DIAGNOSIS — A04.8 BACTERIAL INFECTION DUE TO HELICOBACTER PYLORI: ICD-10-CM

## 2019-01-25 DIAGNOSIS — I10 PRIMARY HYPERTENSION: ICD-10-CM

## 2019-01-25 DIAGNOSIS — K44.9 DIAPHRAGMATIC HERNIA WITHOUT OBSTRUCTION OR GANGRENE: ICD-10-CM

## 2019-01-25 DIAGNOSIS — Z86.010 HISTORY OF COLON POLYPS: ICD-10-CM

## 2019-01-25 DIAGNOSIS — D64.9 ANEMIA, UNSPECIFIED: Primary | ICD-10-CM

## 2019-01-25 DIAGNOSIS — I10 ESSENTIAL HYPERTENSION: ICD-10-CM

## 2019-01-25 DIAGNOSIS — E78.5 HYPERLIPIDEMIA WITH TARGET LDL LESS THAN 100: ICD-10-CM

## 2019-01-25 DIAGNOSIS — Z85.3 HISTORY OF BREAST CANCER: ICD-10-CM

## 2019-01-25 LAB
BASOPHILS # BLD AUTO: 0.05 K/UL
BASOPHILS NFR BLD: 0.4 %
DIFFERENTIAL METHOD: ABNORMAL
EOSINOPHIL # BLD AUTO: 0.1 K/UL
EOSINOPHIL NFR BLD: 0.9 %
ERYTHROCYTE [DISTWIDTH] IN BLOOD BY AUTOMATED COUNT: 14.4 %
FERRITIN SERPL-MCNC: 130 NG/ML
HCT VFR BLD AUTO: 33.4 %
HGB BLD-MCNC: 10.9 G/DL
IMM GRANULOCYTES # BLD AUTO: 0.06 K/UL
IMM GRANULOCYTES NFR BLD AUTO: 0.5 %
IRON SERPL-MCNC: 48 UG/DL
LYMPHOCYTES # BLD AUTO: 3.7 K/UL
LYMPHOCYTES NFR BLD: 31 %
MCH RBC QN AUTO: 27.6 PG
MCHC RBC AUTO-ENTMCNC: 32.6 G/DL
MCV RBC AUTO: 85 FL
MONOCYTES # BLD AUTO: 0.5 K/UL
MONOCYTES NFR BLD: 4.1 %
NEUTROPHILS # BLD AUTO: 7.5 K/UL
NEUTROPHILS NFR BLD: 63.1 %
NRBC BLD-RTO: 0 /100 WBC
PLATELET # BLD AUTO: 295 K/UL
PMV BLD AUTO: 11.9 FL
RBC # BLD AUTO: 3.95 M/UL
SATURATED IRON: 16 %
TOTAL IRON BINDING CAPACITY: 296 UG/DL
TRANSFERRIN SERPL-MCNC: 200 MG/DL
WBC # BLD AUTO: 11.83 K/UL

## 2019-01-25 PROCEDURE — 83540 ASSAY OF IRON: CPT | Mod: HCNC

## 2019-01-25 PROCEDURE — 82728 ASSAY OF FERRITIN: CPT | Mod: HCNC

## 2019-01-25 PROCEDURE — 36415 COLL VENOUS BLD VENIPUNCTURE: CPT | Mod: HCNC,PO

## 2019-01-25 PROCEDURE — 85025 COMPLETE CBC W/AUTO DIFF WBC: CPT | Mod: HCNC

## 2019-01-28 RX ORDER — METFORMIN HYDROCHLORIDE 1000 MG/1
TABLET ORAL
Qty: 180 TABLET | Refills: 1 | Status: SHIPPED | OUTPATIENT
Start: 2019-01-28 | End: 2019-07-16 | Stop reason: SDUPTHER

## 2019-01-28 RX ORDER — LOSARTAN POTASSIUM 25 MG/1
TABLET ORAL
Qty: 90 TABLET | Refills: 1 | Status: SHIPPED | OUTPATIENT
Start: 2019-01-28 | End: 2019-07-16 | Stop reason: SDUPTHER

## 2019-01-28 RX ORDER — LOVASTATIN 20 MG/1
TABLET ORAL
Qty: 90 TABLET | Refills: 1 | Status: SHIPPED | OUTPATIENT
Start: 2019-01-28 | End: 2019-07-16 | Stop reason: SDUPTHER

## 2019-02-05 ENCOUNTER — TELEPHONE (OUTPATIENT)
Dept: FAMILY MEDICINE | Facility: CLINIC | Age: 64
End: 2019-02-05

## 2019-02-05 DIAGNOSIS — E78.5 HYPERLIPIDEMIA WITH TARGET LDL LESS THAN 100: ICD-10-CM

## 2019-02-05 DIAGNOSIS — I10 ESSENTIAL HYPERTENSION: ICD-10-CM

## 2019-02-05 NOTE — TELEPHONE ENCOUNTER
The patient is requesting orders for blood work put in. Her scheduled physical is on June 24, 2019.

## 2019-02-06 NOTE — TELEPHONE ENCOUNTER
----- Message from Brenda Bailey MD sent at 2/5/2019  5:36 PM CST -----  Orders signed - please schedule.

## 2019-02-18 ENCOUNTER — LAB VISIT (OUTPATIENT)
Dept: LAB | Facility: HOSPITAL | Age: 64
End: 2019-02-18
Attending: INTERNAL MEDICINE
Payer: MEDICARE

## 2019-02-18 DIAGNOSIS — D64.9 ANEMIA, UNSPECIFIED TYPE: ICD-10-CM

## 2019-02-18 DIAGNOSIS — C50.911 MALIGNANT NEOPLASM OF RIGHT BREAST IN FEMALE, ESTROGEN RECEPTOR POSITIVE, UNSPECIFIED SITE OF BREAST: ICD-10-CM

## 2019-02-18 DIAGNOSIS — Z17.0 MALIGNANT NEOPLASM OF RIGHT BREAST IN FEMALE, ESTROGEN RECEPTOR POSITIVE, UNSPECIFIED SITE OF BREAST: ICD-10-CM

## 2019-02-18 LAB
ALBUMIN SERPL BCP-MCNC: 3.3 G/DL
ALP SERPL-CCNC: 68 U/L
ALT SERPL W/O P-5'-P-CCNC: 19 U/L
ANION GAP SERPL CALC-SCNC: 7 MMOL/L
AST SERPL-CCNC: 22 U/L
BASOPHILS # BLD AUTO: 0.04 K/UL
BASOPHILS NFR BLD: 0.4 %
BILIRUB SERPL-MCNC: 0.5 MG/DL
BUN SERPL-MCNC: 11 MG/DL
CALCIUM SERPL-MCNC: 9.8 MG/DL
CHLORIDE SERPL-SCNC: 106 MMOL/L
CO2 SERPL-SCNC: 27 MMOL/L
CREAT SERPL-MCNC: 0.8 MG/DL
DIFFERENTIAL METHOD: ABNORMAL
EOSINOPHIL # BLD AUTO: 0.1 K/UL
EOSINOPHIL NFR BLD: 1.2 %
ERYTHROCYTE [DISTWIDTH] IN BLOOD BY AUTOMATED COUNT: 14.3 %
EST. GFR  (AFRICAN AMERICAN): >60 ML/MIN/1.73 M^2
EST. GFR  (NON AFRICAN AMERICAN): >60 ML/MIN/1.73 M^2
GLUCOSE SERPL-MCNC: 126 MG/DL
HCT VFR BLD AUTO: 31.6 %
HGB BLD-MCNC: 10.5 G/DL
LYMPHOCYTES # BLD AUTO: 3.4 K/UL
LYMPHOCYTES NFR BLD: 33.1 %
MCH RBC QN AUTO: 27.4 PG
MCHC RBC AUTO-ENTMCNC: 33.2 G/DL
MCV RBC AUTO: 83 FL
MONOCYTES # BLD AUTO: 0.4 K/UL
MONOCYTES NFR BLD: 4.1 %
NEUTROPHILS # BLD AUTO: 6.3 K/UL
NEUTROPHILS NFR BLD: 60.6 %
NRBC BLD-RTO: 0 /100 WBC
PLATELET # BLD AUTO: 257 K/UL
PMV BLD AUTO: 11.1 FL
POTASSIUM SERPL-SCNC: 3.7 MMOL/L
PROT SERPL-MCNC: 7 G/DL
RBC # BLD AUTO: 3.83 M/UL
SODIUM SERPL-SCNC: 140 MMOL/L
WBC # BLD AUTO: 10.33 K/UL

## 2019-02-18 PROCEDURE — 80053 COMPREHEN METABOLIC PANEL: CPT | Mod: HCNC

## 2019-02-18 PROCEDURE — 85025 COMPLETE CBC W/AUTO DIFF WBC: CPT | Mod: HCNC

## 2019-02-18 PROCEDURE — 36415 COLL VENOUS BLD VENIPUNCTURE: CPT | Mod: HCNC,PO

## 2019-02-25 ENCOUNTER — OFFICE VISIT (OUTPATIENT)
Dept: HEMATOLOGY/ONCOLOGY | Facility: CLINIC | Age: 64
End: 2019-02-25
Payer: MEDICARE

## 2019-02-25 VITALS
HEIGHT: 67 IN | WEIGHT: 218.94 LBS | BODY MASS INDEX: 34.36 KG/M2 | HEART RATE: 77 BPM | DIASTOLIC BLOOD PRESSURE: 66 MMHG | TEMPERATURE: 98 F | SYSTOLIC BLOOD PRESSURE: 135 MMHG | OXYGEN SATURATION: 98 %

## 2019-02-25 DIAGNOSIS — D64.9 ANEMIA, UNSPECIFIED TYPE: ICD-10-CM

## 2019-02-25 DIAGNOSIS — Z17.0 MALIGNANT NEOPLASM OF RIGHT BREAST IN FEMALE, ESTROGEN RECEPTOR POSITIVE, UNSPECIFIED SITE OF BREAST: Primary | ICD-10-CM

## 2019-02-25 DIAGNOSIS — C50.911 MALIGNANT NEOPLASM OF RIGHT BREAST IN FEMALE, ESTROGEN RECEPTOR POSITIVE, UNSPECIFIED SITE OF BREAST: Primary | ICD-10-CM

## 2019-02-25 PROCEDURE — 3008F BODY MASS INDEX DOCD: CPT | Mod: HCNC,CPTII,S$GLB, | Performed by: INTERNAL MEDICINE

## 2019-02-25 PROCEDURE — 3008F PR BODY MASS INDEX (BMI) DOCUMENTED: ICD-10-PCS | Mod: HCNC,CPTII,S$GLB, | Performed by: INTERNAL MEDICINE

## 2019-02-25 PROCEDURE — 3075F PR MOST RECENT SYSTOLIC BLOOD PRESS GE 130-139MM HG: ICD-10-PCS | Mod: HCNC,CPTII,S$GLB, | Performed by: INTERNAL MEDICINE

## 2019-02-25 PROCEDURE — 3078F DIAST BP <80 MM HG: CPT | Mod: HCNC,CPTII,S$GLB, | Performed by: INTERNAL MEDICINE

## 2019-02-25 PROCEDURE — 99999 PR PBB SHADOW E&M-EST. PATIENT-LVL III: ICD-10-PCS | Mod: PBBFAC,HCNC,, | Performed by: INTERNAL MEDICINE

## 2019-02-25 PROCEDURE — 3078F PR MOST RECENT DIASTOLIC BLOOD PRESSURE < 80 MM HG: ICD-10-PCS | Mod: HCNC,CPTII,S$GLB, | Performed by: INTERNAL MEDICINE

## 2019-02-25 PROCEDURE — 99213 OFFICE O/P EST LOW 20 MIN: CPT | Mod: HCNC,S$GLB,, | Performed by: INTERNAL MEDICINE

## 2019-02-25 PROCEDURE — 99999 PR PBB SHADOW E&M-EST. PATIENT-LVL III: CPT | Mod: PBBFAC,HCNC,, | Performed by: INTERNAL MEDICINE

## 2019-02-25 PROCEDURE — 99213 PR OFFICE/OUTPT VISIT, EST, LEVL III, 20-29 MIN: ICD-10-PCS | Mod: HCNC,S$GLB,, | Performed by: INTERNAL MEDICINE

## 2019-02-25 PROCEDURE — 3075F SYST BP GE 130 - 139MM HG: CPT | Mod: HCNC,CPTII,S$GLB, | Performed by: INTERNAL MEDICINE

## 2019-02-25 NOTE — PROGRESS NOTES
Chief Complaint :   Breast Cancer    Hx of Present illness :  Patient is a 63 y.o. year old female who presents to the clinic  for oncology followup. dx'd to have Carcinoma Right Breast  In . Lumpectomy and radiation therapy followed by adjuvant Arimidex therapy. Breast cancer Index did not show  Any benefit with extended harmonal therapy.  Completed arimidex therapy in 2018.  Allergies :    Review of patient's allergies indicates:   Allergen Reactions    Ace inhibitors Other (See Comments)     cough    Penicillins Hives       Occupation : retired .    Transfusion :  None    Menstrual & obstetric Hx :  3; para 3.  Age of menarche: 12  Age of first pregnancy: 18  Lactation history: None  Age of menopause:  Hysterectomy in her late 40's for bleeding  HRT:  For many years.    Present Meds :   Medication List with Changes/Refills   Current Medications    ANASTROZOLE (ARIMIDEX) 1 MG TAB    Take 1 tablet (1 mg total) by mouth every morning.    CALCIUM CARBONATE-VITAMIN D3 (CALCIUM 600 + D,3,) 600 MG CALCIUM- 200 UNIT CAP    Take 1 tablet by mouth every morning.     GLIPIZIDE (GLUCOTROL) 10 MG TR24    TAKE ONE TABLET BY MOUTH TWICE DAILY    HYDROCHLOROTHIAZIDE (HYDRODIURIL) 25 MG TABLET    TAKE 1 TABLET EVERY DAY    LEVOTHYROXINE (SYNTHROID) 125 MCG TABLET    Take 1 tablet (125 mcg total) by mouth before breakfast.    LINAGLIPTIN (TRADJENTA) 5 MG TAB TABLET    Take 1 tablet (5 mg total) by mouth once daily.    LORATADINE (CLARITIN) 10 MG TABLET    Take 10 mg by mouth daily as needed.     LOSARTAN (COZAAR) 25 MG TABLET    TAKE 1 TABLET EVERY DAY    LOVASTATIN (MEVACOR) 20 MG TABLET    TAKE 1 TABLET EVERY DAY    METFORMIN (GLUCOPHAGE) 1000 MG TABLET    TAKE 1 TABLET TWICE DAILY       Past Medical Hx :  Carcinoma right breast. Diabetes Mellitus type 2; Hypertension; hyperlipidemia; Allergic rhinitis; osteopenia; sickle cell trait; Thyroid nodule. No Hx of hepatitis, TB, Sarcoid, Lupus,  rheumatoid arthritis, seizure disorder, CVA, PE or DVT. No prior Hx of Chemotherapy    Past Medical Hx :  Past Medical History:   Diagnosis Date    AR (allergic rhinitis)     Breast cancer 06/2013    right; followed by oncology, Dr. Oliveira; s/p lumpectomy and  radiation    History of colonic polyps     Hyperlipidemia LDL goal < 100     Hypertension     Lower extremity edema     Mild chronic anemia     Obesity     Osteopenia     Sickle cell trait     Thyroid nodule     Type II or unspecified type diabetes mellitus without mention of complication, uncontrolled     Vitamin D deficiency disease        Travel Hx :  N/A    Immunization :  Immunization History   Administered Date(s) Administered    Influenza - Quadrivalent 10/08/2014    Influenza - Quadrivalent - PF 09/28/2016, 12/06/2017, 12/12/2018    Influenza Split 11/26/2012, 01/10/2014    Pneumococcal Conjugate - 13 Valent 05/04/2016    Pneumococcal Polysaccharide - 23 Valent 08/13/2008, 12/06/2017    Tdap 07/24/2009       Family Hx : reviewed  Family History   Problem Relation Age of Onset    Diabetes Mother     Hypertension Mother     Hypertension Father     Prostate cancer Father     Diabetes Brother     Cancer Neg Hx     Breast cancer Neg Hx     Colon cancer Neg Hx     Ovarian cancer Neg Hx     Amblyopia Neg Hx     Blindness Neg Hx     Cataracts Neg Hx     Glaucoma Neg Hx     Macular degeneration Neg Hx     Retinal detachment Neg Hx     Strabismus Neg Hx     Stroke Neg Hx     Thyroid disease Neg Hx        Social Hx : Reviewed  Social History     Socioeconomic History    Marital status:      Spouse name: Not on file    Number of children: 3    Years of education: Not on file    Highest education level: Not on file   Social Needs    Financial resource strain: Not on file    Food insecurity - worry: Not on file    Food insecurity - inability: Not on file    Transportation needs - medical: Not on file     Transportation needs - non-medical: Not on file   Occupational History    Occupation: [employer]   Tobacco Use    Smoking status: Never Smoker    Smokeless tobacco: Never Used   Substance and Sexual Activity    Alcohol use: No    Drug use: Not on file    Sexual activity: Not on file   Other Topics Concern    Not on file   Social History Narrative    Not on file       Surgery :  Right Breast Bx; Lumpectomy Right; Tubal ligation; Complete Hysterectomy; Thyroidectomy.  Colonoscopy in 2014.  EGD in  Aug 2018    Symptoms :    Review of Systems   Constitutional: Negative for chills, diaphoresis, fever, malaise/fatigue and weight loss.   HENT: Positive for congestion. Negative for hearing loss, nosebleeds, sore throat and tinnitus.    Eyes: Negative for blurred vision, double vision and photophobia.        Wears Glasses   Respiratory: Negative for cough, hemoptysis, sputum production and shortness of breath.    Cardiovascular: Negative.  Negative for chest pain, palpitations, orthopnea, claudication, leg swelling and PND.   Gastrointestinal: Negative for abdominal pain, blood in stool, constipation, diarrhea (Mild), heartburn, nausea and vomiting.        Diarrhoea a lot better.   Genitourinary: Negative.  Negative for dysuria, flank pain, frequency, hematuria and urgency.   Musculoskeletal: Positive for joint pain. Negative for back pain, falls, myalgias and neck pain.        Mild   Skin: Negative for itching and rash.   Neurological: Negative for dizziness, tingling, tremors, sensory change and headaches.   Endo/Heme/Allergies: Negative for polydipsia. Does not bruise/bleed easily.   Psychiatric/Behavioral: Negative for depression and memory loss. The patient is nervous/anxious (Sometimes) and has insomnia.        Physical Exam :  Nurse Ana present in the room  Physical Exam   Constitutional: She is oriented to person, place, and time and well-developed, well-nourished, and in no distress. No distress.   HENT:    Head: Normocephalic and atraumatic.   Right Ear: External ear normal.   Left Ear: External ear normal.   Nose: Nose normal.   Mouth/Throat: Oropharynx is clear and moist. No oropharyngeal exudate.   Eyes: Conjunctivae and EOM are normal. Pupils are equal, round, and reactive to light. Right eye exhibits no discharge. Left eye exhibits no discharge. No scleral icterus.   Neck: Normal range of motion and full passive range of motion without pain. Neck supple.       Cardiovascular: Normal rate, regular rhythm, S1 normal, normal heart sounds, intact distal pulses and normal pulses. PMI is not displaced.   Pulmonary/Chest: Breath sounds normal. No apnea. Right breast exhibits no inverted nipple, no mass, no nipple discharge, no skin change and no tenderness. Left breast exhibits no inverted nipple, no mass, no nipple discharge, no skin change and no tenderness.       Abdominal: Soft. Normal appearance, normal aorta and bowel sounds are normal. She exhibits no distension, no ascites and no mass. There is no hepatosplenomegaly, splenomegaly or hepatomegaly. There is no tenderness. There is no rebound and no CVA tenderness. No hernia.   Genitourinary:   Genitourinary Comments: Not Examined   Musculoskeletal: Normal range of motion.   Lymphadenopathy:        Head (right side): No submental, no submandibular, no tonsillar, no preauricular, no posterior auricular and no occipital adenopathy present.        Head (left side): No submental, no submandibular, no tonsillar, no preauricular, no posterior auricular and no occipital adenopathy present.     She has no cervical adenopathy.     She has no axillary adenopathy.        Right: No inguinal, no supraclavicular and no epitrochlear adenopathy present.        Left: No inguinal, no supraclavicular and no epitrochlear adenopathy present.   Neurological: She is alert and oriented to person, place, and time. She has normal motor skills, normal sensation, normal strength, normal  reflexes and intact cranial nerves. Gait normal. GCS score is 15.   Skin: Skin is warm, dry and intact. No rash noted. She is not diaphoretic. No cyanosis. No pallor. Nails show no clubbing.   Psychiatric: Mood, memory, affect and judgment normal.         Labs & Imaging :   10/23/18  : Serum iron 42; TIBC 287; sat 15 %.  NFBS 156; Cr. 0.8; Ca 9.7 bili 0.5 ALP 76. Hgb 10.7; Hct 32.3; MCV 84. Plts 301,000 ANC 8,600  02/18/19 : NFBS 126; Cr.0.8; Ca 9.8  Bili 0.5 ALP 68.    hgb 10.5; hct 31.6; MCV 83; Platelets  257,000 ANC 6.300    Dx :  Carcinoma Right breast in remission.   Gastritis. Gastric polyp removed.      Assessment & Plan:  Reviewed with patient. . OTC Iron One daily.  Endocrine followup with Dr.Amy Dubose.  Routine Medical followup with . GI followup with    Oncology followup in 4.  months with CBC,CMP,CEA.  Patient understands and verbalised.

## 2019-02-27 ENCOUNTER — LAB VISIT (OUTPATIENT)
Dept: LAB | Facility: HOSPITAL | Age: 64
End: 2019-02-27
Attending: INTERNAL MEDICINE
Payer: MEDICARE

## 2019-02-27 DIAGNOSIS — A02.0 INTESTINAL INFECTION DUE TO ARIZONA GROUP OF PARACOLON BACILLI: Primary | ICD-10-CM

## 2019-02-27 PROCEDURE — 87338 HPYLORI STOOL AG IA: CPT | Mod: HCNC

## 2019-03-03 LAB — H PYLORI AG STL QL IA: NOT DETECTED

## 2019-03-25 DIAGNOSIS — I10 ESSENTIAL HYPERTENSION: ICD-10-CM

## 2019-03-26 RX ORDER — HYDROCHLOROTHIAZIDE 25 MG/1
TABLET ORAL
Qty: 90 TABLET | Refills: 0 | Status: SHIPPED | OUTPATIENT
Start: 2019-03-26 | End: 2019-06-04 | Stop reason: SDUPTHER

## 2019-04-08 RX ORDER — GLIPIZIDE 10 MG/1
TABLET, FILM COATED, EXTENDED RELEASE ORAL
Qty: 180 TABLET | Refills: 0 | Status: SHIPPED | OUTPATIENT
Start: 2019-04-08 | End: 2019-06-24 | Stop reason: SDUPTHER

## 2019-06-04 DIAGNOSIS — I10 ESSENTIAL HYPERTENSION: ICD-10-CM

## 2019-06-04 RX ORDER — HYDROCHLOROTHIAZIDE 25 MG/1
TABLET ORAL
Qty: 90 TABLET | Refills: 0 | Status: SHIPPED | OUTPATIENT
Start: 2019-06-04 | End: 2019-08-07 | Stop reason: SDUPTHER

## 2019-06-10 ENCOUNTER — PATIENT OUTREACH (OUTPATIENT)
Dept: ADMINISTRATIVE | Facility: HOSPITAL | Age: 64
End: 2019-06-10

## 2019-06-22 ENCOUNTER — LAB VISIT (OUTPATIENT)
Dept: LAB | Facility: HOSPITAL | Age: 64
End: 2019-06-22
Attending: INTERNAL MEDICINE
Payer: MEDICARE

## 2019-06-22 DIAGNOSIS — I10 ESSENTIAL HYPERTENSION, MALIGNANT: ICD-10-CM

## 2019-06-22 DIAGNOSIS — C50.911 MALIGNANT NEOPLASM OF RIGHT BREAST IN FEMALE, ESTROGEN RECEPTOR POSITIVE, UNSPECIFIED SITE OF BREAST: ICD-10-CM

## 2019-06-22 DIAGNOSIS — D64.9 ANEMIA, UNSPECIFIED: Primary | ICD-10-CM

## 2019-06-22 DIAGNOSIS — E78.5 HYPERLIPIDEMIA WITH TARGET LDL LESS THAN 100: ICD-10-CM

## 2019-06-22 DIAGNOSIS — Z17.0 MALIGNANT NEOPLASM OF RIGHT BREAST IN FEMALE, ESTROGEN RECEPTOR POSITIVE, UNSPECIFIED SITE OF BREAST: ICD-10-CM

## 2019-06-22 DIAGNOSIS — E11.9 DIABETES MELLITUS WITHOUT COMPLICATION: ICD-10-CM

## 2019-06-22 DIAGNOSIS — D64.9 ANEMIA, UNSPECIFIED TYPE: ICD-10-CM

## 2019-06-22 LAB
ALBUMIN SERPL BCP-MCNC: 3.3 G/DL (ref 3.5–5.2)
ALP SERPL-CCNC: 69 U/L (ref 55–135)
ALT SERPL W/O P-5'-P-CCNC: 16 U/L (ref 10–44)
ANION GAP SERPL CALC-SCNC: 13 MMOL/L (ref 8–16)
AST SERPL-CCNC: 21 U/L (ref 10–40)
BASOPHILS # BLD AUTO: 0.04 K/UL (ref 0–0.2)
BASOPHILS NFR BLD: 0.4 % (ref 0–1.9)
BILIRUB SERPL-MCNC: 0.6 MG/DL (ref 0.1–1)
BUN SERPL-MCNC: 11 MG/DL (ref 8–23)
CALCIUM SERPL-MCNC: 10.2 MG/DL (ref 8.7–10.5)
CEA SERPL-MCNC: 1 NG/ML (ref 0–5)
CHLORIDE SERPL-SCNC: 104 MMOL/L (ref 95–110)
CHOLEST SERPL-MCNC: 128 MG/DL (ref 120–199)
CHOLEST/HDLC SERPL: 2.7 {RATIO} (ref 2–5)
CO2 SERPL-SCNC: 25 MMOL/L (ref 23–29)
CREAT SERPL-MCNC: 0.8 MG/DL (ref 0.5–1.4)
DIFFERENTIAL METHOD: ABNORMAL
EOSINOPHIL # BLD AUTO: 0.1 K/UL (ref 0–0.5)
EOSINOPHIL NFR BLD: 0.9 % (ref 0–8)
ERYTHROCYTE [DISTWIDTH] IN BLOOD BY AUTOMATED COUNT: 15.1 % (ref 11.5–14.5)
EST. GFR  (AFRICAN AMERICAN): >60 ML/MIN/1.73 M^2
EST. GFR  (NON AFRICAN AMERICAN): >60 ML/MIN/1.73 M^2
ESTIMATED AVG GLUCOSE: 169 MG/DL (ref 68–131)
FERRITIN SERPL-MCNC: 150 NG/ML (ref 20–300)
GLUCOSE SERPL-MCNC: 109 MG/DL (ref 70–110)
HBA1C MFR BLD HPLC: 7.5 % (ref 4–5.6)
HCT VFR BLD AUTO: 32.8 % (ref 37–48.5)
HDLC SERPL-MCNC: 48 MG/DL (ref 40–75)
HDLC SERPL: 37.5 % (ref 20–50)
HGB BLD-MCNC: 10.7 G/DL (ref 12–16)
IMM GRANULOCYTES # BLD AUTO: 0.06 K/UL (ref 0–0.04)
IMM GRANULOCYTES NFR BLD AUTO: 0.6 % (ref 0–0.5)
IRON SERPL-MCNC: 80 UG/DL (ref 30–160)
LDLC SERPL CALC-MCNC: 61.2 MG/DL (ref 63–159)
LYMPHOCYTES # BLD AUTO: 3.4 K/UL (ref 1–4.8)
LYMPHOCYTES NFR BLD: 31.1 % (ref 18–48)
MCH RBC QN AUTO: 27.5 PG (ref 27–31)
MCHC RBC AUTO-ENTMCNC: 32.6 G/DL (ref 32–36)
MCV RBC AUTO: 84 FL (ref 82–98)
MONOCYTES # BLD AUTO: 0.4 K/UL (ref 0.3–1)
MONOCYTES NFR BLD: 3.8 % (ref 4–15)
NEUTROPHILS # BLD AUTO: 6.8 K/UL (ref 1.8–7.7)
NEUTROPHILS NFR BLD: 63.2 % (ref 38–73)
NONHDLC SERPL-MCNC: 80 MG/DL
NRBC BLD-RTO: 0 /100 WBC
PLATELET # BLD AUTO: 275 K/UL (ref 150–350)
PMV BLD AUTO: 10.9 FL (ref 9.2–12.9)
POTASSIUM SERPL-SCNC: 3.4 MMOL/L (ref 3.5–5.1)
PROT SERPL-MCNC: 7.1 G/DL (ref 6–8.4)
RBC # BLD AUTO: 3.89 M/UL (ref 4–5.4)
SATURATED IRON: 27 % (ref 20–50)
SODIUM SERPL-SCNC: 142 MMOL/L (ref 136–145)
TOTAL IRON BINDING CAPACITY: 293 UG/DL (ref 250–450)
TRANSFERRIN SERPL-MCNC: 198 MG/DL (ref 200–375)
TRIGL SERPL-MCNC: 94 MG/DL (ref 30–150)
WBC # BLD AUTO: 10.76 K/UL (ref 3.9–12.7)

## 2019-06-22 PROCEDURE — 82728 ASSAY OF FERRITIN: CPT | Mod: HCNC

## 2019-06-22 PROCEDURE — 80061 LIPID PANEL: CPT | Mod: HCNC

## 2019-06-22 PROCEDURE — 80053 COMPREHEN METABOLIC PANEL: CPT | Mod: HCNC

## 2019-06-22 PROCEDURE — 82378 CARCINOEMBRYONIC ANTIGEN: CPT | Mod: HCNC

## 2019-06-22 PROCEDURE — 85025 COMPLETE CBC W/AUTO DIFF WBC: CPT | Mod: HCNC

## 2019-06-22 PROCEDURE — 36415 COLL VENOUS BLD VENIPUNCTURE: CPT | Mod: HCNC,PO

## 2019-06-22 PROCEDURE — 83036 HEMOGLOBIN GLYCOSYLATED A1C: CPT | Mod: HCNC

## 2019-06-22 PROCEDURE — 83540 ASSAY OF IRON: CPT | Mod: HCNC

## 2019-06-24 ENCOUNTER — LAB VISIT (OUTPATIENT)
Dept: LAB | Facility: HOSPITAL | Age: 64
End: 2019-06-24
Attending: INTERNAL MEDICINE
Payer: MEDICARE

## 2019-06-24 ENCOUNTER — OFFICE VISIT (OUTPATIENT)
Dept: FAMILY MEDICINE | Facility: CLINIC | Age: 64
End: 2019-06-24
Payer: MEDICARE

## 2019-06-24 VITALS
OXYGEN SATURATION: 98 % | HEART RATE: 72 BPM | DIASTOLIC BLOOD PRESSURE: 72 MMHG | SYSTOLIC BLOOD PRESSURE: 118 MMHG | TEMPERATURE: 98 F | WEIGHT: 226.94 LBS | HEIGHT: 66 IN | BODY MASS INDEX: 36.47 KG/M2

## 2019-06-24 DIAGNOSIS — E89.0 HYPOTHYROIDISM, POSTSURGICAL: ICD-10-CM

## 2019-06-24 DIAGNOSIS — I10 ESSENTIAL HYPERTENSION: ICD-10-CM

## 2019-06-24 DIAGNOSIS — D57.3 SICKLE CELL TRAIT: ICD-10-CM

## 2019-06-24 DIAGNOSIS — E78.5 HYPERLIPIDEMIA WITH TARGET LDL LESS THAN 100: ICD-10-CM

## 2019-06-24 DIAGNOSIS — E66.01 SEVERE OBESITY (BMI 35.0-35.9 WITH COMORBIDITY): ICD-10-CM

## 2019-06-24 DIAGNOSIS — Z85.3 HX: BREAST CANCER: ICD-10-CM

## 2019-06-24 DIAGNOSIS — Z00.00 ROUTINE MEDICAL EXAM: Primary | ICD-10-CM

## 2019-06-24 DIAGNOSIS — M85.89 OSTEOPENIA OF MULTIPLE SITES: ICD-10-CM

## 2019-06-24 PROBLEM — Z17.0 MALIGNANT NEOPLASM OF RIGHT BREAST IN FEMALE, ESTROGEN RECEPTOR POSITIVE: Status: RESOLVED | Noted: 2018-07-25 | Resolved: 2019-06-24

## 2019-06-24 PROBLEM — D64.9 ANEMIA: Status: RESOLVED | Noted: 2018-07-25 | Resolved: 2019-06-24

## 2019-06-24 PROBLEM — C50.911 MALIGNANT NEOPLASM OF RIGHT BREAST IN FEMALE, ESTROGEN RECEPTOR POSITIVE: Status: RESOLVED | Noted: 2018-07-25 | Resolved: 2019-06-24

## 2019-06-24 PROBLEM — Z17.0 MALIGNANT NEOPLASM OF BREAST IN FEMALE, ESTROGEN RECEPTOR POSITIVE: Status: RESOLVED | Noted: 2018-01-22 | Resolved: 2019-06-24

## 2019-06-24 PROBLEM — C50.919 MALIGNANT NEOPLASM OF BREAST IN FEMALE, ESTROGEN RECEPTOR POSITIVE: Status: RESOLVED | Noted: 2018-01-22 | Resolved: 2019-06-24

## 2019-06-24 LAB
ALBUMIN/CREAT UR: 5.9 UG/MG (ref 0–30)
CREAT UR-MCNC: 185 MG/DL (ref 15–325)
MICROALBUMIN UR DL<=1MG/L-MCNC: 11 UG/ML

## 2019-06-24 PROCEDURE — 3074F SYST BP LT 130 MM HG: CPT | Mod: HCNC,CPTII,S$GLB, | Performed by: INTERNAL MEDICINE

## 2019-06-24 PROCEDURE — 82043 UR ALBUMIN QUANTITATIVE: CPT | Mod: HCNC

## 2019-06-24 PROCEDURE — 3045F PR MOST RECENT HEMOGLOBIN A1C LEVEL 7.0-9.0%: CPT | Mod: HCNC,CPTII,S$GLB, | Performed by: INTERNAL MEDICINE

## 2019-06-24 PROCEDURE — 3078F DIAST BP <80 MM HG: CPT | Mod: HCNC,CPTII,S$GLB, | Performed by: INTERNAL MEDICINE

## 2019-06-24 PROCEDURE — 99396 PREV VISIT EST AGE 40-64: CPT | Mod: HCNC,S$GLB,, | Performed by: INTERNAL MEDICINE

## 2019-06-24 PROCEDURE — 3078F PR MOST RECENT DIASTOLIC BLOOD PRESSURE < 80 MM HG: ICD-10-PCS | Mod: HCNC,CPTII,S$GLB, | Performed by: INTERNAL MEDICINE

## 2019-06-24 PROCEDURE — 3074F PR MOST RECENT SYSTOLIC BLOOD PRESSURE < 130 MM HG: ICD-10-PCS | Mod: HCNC,CPTII,S$GLB, | Performed by: INTERNAL MEDICINE

## 2019-06-24 PROCEDURE — 3045F PR MOST RECENT HEMOGLOBIN A1C LEVEL 7.0-9.0%: ICD-10-PCS | Mod: HCNC,CPTII,S$GLB, | Performed by: INTERNAL MEDICINE

## 2019-06-24 PROCEDURE — 99999 PR PBB SHADOW E&M-EST. PATIENT-LVL III: ICD-10-PCS | Mod: PBBFAC,HCNC,, | Performed by: INTERNAL MEDICINE

## 2019-06-24 PROCEDURE — 99396 PR PREVENTIVE VISIT,EST,40-64: ICD-10-PCS | Mod: HCNC,S$GLB,, | Performed by: INTERNAL MEDICINE

## 2019-06-24 PROCEDURE — 99999 PR PBB SHADOW E&M-EST. PATIENT-LVL III: CPT | Mod: PBBFAC,HCNC,, | Performed by: INTERNAL MEDICINE

## 2019-06-24 RX ORDER — GLIPIZIDE 10 MG/1
TABLET, FILM COATED, EXTENDED RELEASE ORAL
Qty: 180 TABLET | Refills: 0 | Status: CANCELLED | OUTPATIENT
Start: 2019-06-24

## 2019-06-24 NOTE — PROGRESS NOTES
HISTORY OF PRESENT ILLNESS:  Masha Garcia is a 64 y.o. female who presents to the clinic today for a routine medical physical exam. Her last physical exam was approximately 1 years(s) ago.        PAST MEDICAL HISTORY:  Past Medical History:   Diagnosis Date    AR (allergic rhinitis)     Breast cancer 06/2013    right; followed by oncology, Dr. Oliveira; s/p lumpectomy and  radiation    History of colonic polyps     Hyperlipidemia LDL goal < 100     Hypertension     Lower extremity edema     Mild chronic anemia     Obesity     Osteopenia     Sickle cell trait     Thyroid nodule     Type II or unspecified type diabetes mellitus without mention of complication, uncontrolled     Vitamin D deficiency disease        PAST SURGICAL HISTORY:  Past Surgical History:   Procedure Laterality Date    benign right breast biopsy  2012    COLONOSCOPY N/A 2/18/2013    [facility]    lumpectomy right breast  6/2013    REIMPLANTATION-PARATHYROID TISSUE  12/28/2016    [facility] 2[facility]    right ankle fracture repair      THYROIDECTOMY total Bilateral 12/28/2016    [facility] at [facility]    TOTAL ABDOMINAL HYSTERECTOMY      TOTAL THYROIDECTOMY  12/28/2016       SOCIAL HISTORY:  Social History     Socioeconomic History    Marital status:      Spouse name: Not on file    Number of children: 3    Years of education: Not on file    Highest education level: Not on file   Occupational History    Occupation: retired   Social Needs    Financial resource strain: Not on file    Food insecurity:     Worry: Not on file     Inability: Not on file    Transportation needs:     Medical: Not on file     Non-medical: Not on file   Tobacco Use    Smoking status: Never Smoker    Smokeless tobacco: Never Used   Substance and Sexual Activity    Alcohol use: No    Drug use: Not on file    Sexual activity: Not on  file   Lifestyle    Physical activity:     Days per week: Not on file     Minutes per session: Not on file    Stress: Not on file   Relationships    Social connections:     Talks on phone: Not on file     Gets together: Not on file     Attends Confucianist service: Not on file     Active member of club or organization: Not on file     Attends meetings of clubs or organizations: Not on file     Relationship status: Not on file   Other Topics Concern    Not on file   Social History Narrative    Not on file       FAMILY HISTORY:  Family History   Problem Relation Age of Onset    Diabetes Mother     Hypertension Mother     Hypertension Father     Prostate cancer Father     Diabetes Brother     Cancer Neg Hx     Breast cancer Neg Hx     Colon cancer Neg Hx     Ovarian cancer Neg Hx     Amblyopia Neg Hx     Blindness Neg Hx     Cataracts Neg Hx     Glaucoma Neg Hx     Macular degeneration Neg Hx     Retinal detachment Neg Hx     Strabismus Neg Hx     Stroke Neg Hx     Thyroid disease Neg Hx        ALLERGIES AND MEDICATIONS: updated and reviewed.  Review of patient's allergies indicates:   Allergen Reactions    Fosamax [alendronate] Other (See Comments)     - 'funny feeling'    Ace inhibitors Other (See Comments)     cough    Penicillins Hives     Medication List with Changes/Refills   Current Medications    CALCIUM CARBONATE-VITAMIN D3 (CALCIUM 600 + D,3,) 600 MG CALCIUM- 200 UNIT CAP    Take 1 tablet by mouth every morning.     GLIPIZIDE (GLUCOTROL) 10 MG TR24    TAKE ONE TABLET BY MOUTH TWICE DAILY    HYDROCHLOROTHIAZIDE (HYDRODIURIL) 25 MG TABLET    TAKE 1 TABLET EVERY DAY    LEVOTHYROXINE (SYNTHROID) 125 MCG TABLET    Take 1 tablet (125 mcg total) by mouth before breakfast.    LINAGLIPTIN (TRADJENTA) 5 MG TAB TABLET    Take 1 tablet (5 mg total) by mouth once daily.    LORATADINE (CLARITIN) 10 MG TABLET    Take 10 mg by mouth daily as needed.     LOSARTAN (COZAAR) 25 MG TABLET    TAKE 1 TABLET  EVERY DAY    LOVASTATIN (MEVACOR) 20 MG TABLET    TAKE 1 TABLET EVERY DAY    METFORMIN (GLUCOPHAGE) 1000 MG TABLET    TAKE 1 TABLET TWICE DAILY   Discontinued Medications    ANASTROZOLE (ARIMIDEX) 1 MG TAB    Take 1 tablet (1 mg total) by mouth every morning.    GLIPIZIDE (GLUCOTROL) 10 MG TR24    TAKE 1 TABLET BY MOUTH TWICE DAILY         CARE TEAM:  Patient Care Team:  Brenda Bailey MD as PCP - General (Internal Medicine)  Eun Frederick MD as Consulting Physician (Oncology)  Elsy Dubose MD as Consulting Physician (Endocrinology)  Angeles Cao LPN as Licensed Practical Nurse           SCREENING HISTORY:  Health Maintenance       Date Due Completion Date    Shingles Vaccine (1 of 2) 03/09/2005 ---    Colonoscopy 06/13/2019 6/13/2014    Urine Microalbumin 07/23/2019 7/23/2018    TETANUS VACCINE 07/24/2019 7/24/2009    Influenza Vaccine 08/01/2019 12/12/2018    Eye Exam 08/21/2019 8/21/2018    Override on 12/9/2014: Done    Override on 11/1/2010: Done    Foot Exam 12/12/2019 12/12/2018    Override on 9/28/2016: Done (normal see note in visit)    DEXA SCAN 12/13/2019 12/13/2017    Hemoglobin A1c 12/22/2019 6/22/2019    Mammogram 06/05/2020 6/5/2019    Override on 6/19/2018: Done (imageing center)    Override on 6/15/2016: Done (done at [facility] normal scanned report to chart)    Override on 3/18/2015: Done    Override on 3/1/2014: Done ([facility])    Override on 4/1/2013: Done    Override on 2/1/2012: Done    Lipid Panel 06/22/2020 6/22/2019    Low Dose Statin 06/24/2020 6/24/2019            REVIEW OF SYSTEMS:   The patient reports: fair dietary habits.  The patient reports : that they do not exercise.  Review of Systems   Constitutional: Negative for chills, fatigue, fever and unexpected weight change.   HENT: Negative for congestion and postnasal drip.    Eyes: Negative for pain and visual disturbance.   Respiratory: Negative for cough, shortness of breath and wheezing.   "  Cardiovascular: Negative for chest pain, palpitations and leg swelling.   Gastrointestinal: Negative for abdominal pain, constipation, diarrhea, nausea and vomiting.   Genitourinary: Negative for dysuria.   Musculoskeletal: Positive for arthralgias. Negative for back pain.   Skin: Negative for rash.        Has some itching at site of keloid formation from thyroidectomy.   Neurological: Negative for weakness and headaches.   Psychiatric/Behavioral: Negative for dysphoric mood and sleep disturbance. The patient is not nervous/anxious.      Breast ROS: negative for breast lumps             Physical Examination:   Vitals:    06/24/19 1042   BP: 118/72   Pulse: 72   Temp: 98.4 °F (36.9 °C)     Weight: 103 kg (226 lb 15.4 oz)   Height: 5' 6" (167.6 cm)   Body mass index is 36.63 kg/m².      Patient did not require to have a chaperone present during the exam today.  General appearance - alert, well appearing, and in no distress and obese  Mental status - alert, oriented to person, place, and time, normal mood, behavior, speech, dress, motor activity, and thought processes  Eyes - pupils equal and reactive, extraocular eye movements intact, sclera anicteric  Mouth - mucous membranes moist, pharynx normal without lesions  Neck - supple, no significant adenopathy, carotids upstroke normal bilaterally, no bruits, thyroid exam: Thyroidectomy Scar Noted with keloid formation  Lymphatics - no palpable lymphadenopathy  Chest - clear to auscultation, no wheezes, rales or rhonchi, symmetric air entry  Heart - normal rate and regular rhythm, no gallops noted  Abdomen - soft, nontender, nondistended, no masses or organomegaly  Breasts - not examined  Back exam - mild limited range of motion, no significant pain with motion noted during exam; in depth exam deferred  Neurological - alert, oriented, normal speech, no focal findings or movement disorder noted, cranial nerves II through XII intact  Musculoskeletal - no muscular " tenderness noted, Moderate osteoarthritic changes noted to both knee joints. No joint effusions noted.   Extremities - pedal edema trace +  Skin - normal coloration and turgor, no rashes, no suspicious skin lesions noted      Lab Results   Component Value Date    HGBA1C 7.5 (H) 06/22/2019    HGBA1C 7.6 (H) 12/12/2018    HGBA1C 7.1 (H) 06/16/2018      Lab Results   Component Value Date    CHOL 128 06/22/2019    CHOL 152 06/16/2018    CHOL 137 04/01/2017     Lab Results   Component Value Date    LDLCALC 61.2 (L) 06/22/2019    LDLCALC 80.8 06/16/2018    LDLCALC 67.4 04/01/2017          ASSESSMENT AND PLAN:  1. Routine medical exam  Counseled on age appropriate medical preventative services including age appropriate cancer screenings, age appropriate eye and dental exams, over all nutritional health, need for a consistent exercise regimen, and an over all push towards maintaining a vigorous and active lifestyle.  Counseled on age appropriate vaccines and discussed upcoming health care needs based on age/gender. Discussed good sleep hygiene and stress management.   She is scheduled for her colonoscopy on 7/2.    2. Uncontrolled type 2 diabetes mellitus without complication, without long-term current use of insulin  Diabetes currently is controlled for age and comorbid conditions. We discussed diabetic diet and regular exercise. We discussed home blood sugar monitoring, if appropriate - the patient should test once daily and as needed. Continue current medication regimen. and Recheck A1c in 6 months.  Diabetic complications addressed: Not applicable.  Patient was counseled on the need for yearly eye exam to screen for/monitor diabetic retinopathy and yearly diabetic foot exam.   - Microalbumin/creatinine urine ratio; Future    3. Essential hypertension  Discussed sodium restriction, maintaining ideal body weight and regular exercise program as physiologic means to achieve blood pressure control. The patient will strive  towards this. The current medical regimen is effective;  continue present plan and medications. Recommended patient to check home readings to monitor and see me for followup as scheduled or sooner as needed. Patient was educated that both decongestant and anti-inflammatory medication may raise blood pressure.     4. Hyperlipidemia with target LDL less than 100  We discussed low fat diet and regular exercise.The current medical regimen is effective;  continue present plan and medications.      5. Hypothyroidism, postsurgical  Patient is clinically euthyroid. Continue current regimen.     6. Osteopenia of multiple sites  We discussed adequate calcium and vitamin D supplementation. We discussed fall precautions. She is up to date on her BMD. No need for prescription medication at this time. Followed by GYN.    7. Sickle cell trait  Stable. Asymptomatic. Observe.     8. HX: breast cancer  Followed by oncology.    9. Severe obesity (BMI 35.0-35.9 with comorbidity)  The patient is asked to make an attempt to improve diet and exercise patterns to aid in medical management of this problem.           Follow up in about 6 months (around 12/24/2019), or if symptoms worsen or fail to improve, for follow up chronic medical conditions.. or sooner as needed.

## 2019-07-03 ENCOUNTER — TELEPHONE (OUTPATIENT)
Dept: ADMINISTRATIVE | Facility: HOSPITAL | Age: 64
End: 2019-07-03

## 2019-07-03 RX ORDER — LEVOTHYROXINE SODIUM 125 UG/1
125 TABLET ORAL DAILY
Refills: 3 | COMMUNITY
Start: 2019-06-24 | End: 2021-01-04 | Stop reason: ALTCHOICE

## 2019-07-07 RX ORDER — GLIPIZIDE 10 MG/1
TABLET, FILM COATED, EXTENDED RELEASE ORAL
Qty: 180 TABLET | Refills: 1 | Status: SHIPPED | OUTPATIENT
Start: 2019-07-07 | End: 2020-01-28

## 2019-07-15 ENCOUNTER — OFFICE VISIT (OUTPATIENT)
Dept: HEMATOLOGY/ONCOLOGY | Facility: CLINIC | Age: 64
End: 2019-07-15
Payer: MEDICARE

## 2019-07-15 VITALS
BODY MASS INDEX: 34.43 KG/M2 | TEMPERATURE: 98 F | OXYGEN SATURATION: 97 % | WEIGHT: 219.38 LBS | HEART RATE: 84 BPM | SYSTOLIC BLOOD PRESSURE: 119 MMHG | HEIGHT: 67 IN | DIASTOLIC BLOOD PRESSURE: 59 MMHG

## 2019-07-15 DIAGNOSIS — D64.9 ANEMIA, UNSPECIFIED TYPE: ICD-10-CM

## 2019-07-15 DIAGNOSIS — Z17.0 MALIGNANT NEOPLASM OF RIGHT BREAST IN FEMALE, ESTROGEN RECEPTOR POSITIVE, UNSPECIFIED SITE OF BREAST: Primary | ICD-10-CM

## 2019-07-15 DIAGNOSIS — C50.911 MALIGNANT NEOPLASM OF RIGHT BREAST IN FEMALE, ESTROGEN RECEPTOR POSITIVE, UNSPECIFIED SITE OF BREAST: Primary | ICD-10-CM

## 2019-07-15 PROCEDURE — 99213 OFFICE O/P EST LOW 20 MIN: CPT | Mod: HCNC,S$GLB,, | Performed by: INTERNAL MEDICINE

## 2019-07-15 PROCEDURE — 3078F PR MOST RECENT DIASTOLIC BLOOD PRESSURE < 80 MM HG: ICD-10-PCS | Mod: HCNC,CPTII,S$GLB, | Performed by: INTERNAL MEDICINE

## 2019-07-15 PROCEDURE — 99999 PR PBB SHADOW E&M-EST. PATIENT-LVL III: CPT | Mod: PBBFAC,HCNC,, | Performed by: INTERNAL MEDICINE

## 2019-07-15 PROCEDURE — 3078F DIAST BP <80 MM HG: CPT | Mod: HCNC,CPTII,S$GLB, | Performed by: INTERNAL MEDICINE

## 2019-07-15 PROCEDURE — 99999 PR PBB SHADOW E&M-EST. PATIENT-LVL III: ICD-10-PCS | Mod: PBBFAC,HCNC,, | Performed by: INTERNAL MEDICINE

## 2019-07-15 PROCEDURE — 3074F SYST BP LT 130 MM HG: CPT | Mod: HCNC,CPTII,S$GLB, | Performed by: INTERNAL MEDICINE

## 2019-07-15 PROCEDURE — 3074F PR MOST RECENT SYSTOLIC BLOOD PRESSURE < 130 MM HG: ICD-10-PCS | Mod: HCNC,CPTII,S$GLB, | Performed by: INTERNAL MEDICINE

## 2019-07-15 PROCEDURE — 3008F PR BODY MASS INDEX (BMI) DOCUMENTED: ICD-10-PCS | Mod: HCNC,CPTII,S$GLB, | Performed by: INTERNAL MEDICINE

## 2019-07-15 PROCEDURE — 99213 PR OFFICE/OUTPT VISIT, EST, LEVL III, 20-29 MIN: ICD-10-PCS | Mod: HCNC,S$GLB,, | Performed by: INTERNAL MEDICINE

## 2019-07-15 PROCEDURE — 3008F BODY MASS INDEX DOCD: CPT | Mod: HCNC,CPTII,S$GLB, | Performed by: INTERNAL MEDICINE

## 2019-07-15 NOTE — PROGRESS NOTES
Chief Complaint :   Breast Cancer    Hx of Present illness :  Patient is a 64 y.o. year old female who presents to the clinic  for oncology followup. dx'd to have Carcinoma Right Breast  In . Lumpectomy and radiation therapy followed by adjuvant Arimidex therapy. Breast cancer Index did not show  Any benefit with extended harmonal therapy.  Completed arimidex therapy in 2018. Since last visit has had colonoscopic exam on 19 : documented to have a single sessile polyp in the ascending colon. polypectpomy done. Documented to have Grade 2 Internal hemmorrhoids.  Allergies :    Review of patient's allergies indicates:   Allergen Reactions    Ace inhibitors Other (See Comments)     cough    Penicillins Hives       Occupation : retired .    Transfusion :  None    Menstrual & obstetric Hx :  3; para 3.  Age of menarche: 12  Age of first pregnancy: 18  Lactation history: None  Age of menopause:  Hysterectomy in her late 40's for bleeding  HRT:  For many years.    Present Meds :   Medication List with Changes/Refills   Current Medications    CALCIUM CARBONATE-VITAMIN D3 (CALCIUM 600 + D,3,) 600 MG CALCIUM- 200 UNIT CAP    Take 1 tablet by mouth every morning.     GLIPIZIDE (GLUCOTROL) 10 MG TR24    TAKE ONE TABLET BY MOUTH TWICE DAILY    GLIPIZIDE (GLUCOTROL) 10 MG TR24    TAKE 1 TABLET BY MOUTH TWICE DAILY    HYDROCHLOROTHIAZIDE (HYDRODIURIL) 25 MG TABLET    TAKE 1 TABLET EVERY DAY    LEVOTHYROXINE (SYNTHROID) 125 MCG TABLET    Take 125 mcg by mouth once daily.    LINAGLIPTIN (TRADJENTA) 5 MG TAB TABLET    Take 1 tablet (5 mg total) by mouth once daily.    LORATADINE (CLARITIN) 10 MG TABLET    Take 10 mg by mouth daily as needed.     LOSARTAN (COZAAR) 25 MG TABLET    TAKE 1 TABLET EVERY DAY    LOVASTATIN (MEVACOR) 20 MG TABLET    TAKE 1 TABLET EVERY DAY    METFORMIN (GLUCOPHAGE) 1000 MG TABLET    TAKE 1 TABLET TWICE DAILY       Past Medical Hx :  Carcinoma right breast. Diabetes  Mellitus type 2; Hypertension; hyperlipidemia; Allergic rhinitis; osteopenia; sickle cell trait; Thyroid nodule. No Hx of hepatitis, TB, Sarcoid, Lupus, rheumatoid arthritis, seizure disorder, CVA, PE or DVT. No prior Hx of Chemotherapy    Past Medical Hx :  Past Medical History:   Diagnosis Date    AR (allergic rhinitis)     Breast cancer 06/2013    right; followed by oncology, Dr. Oliveira; s/p lumpectomy and  radiation    History of colonic polyps     Hyperlipidemia LDL goal < 100     Hypertension     Lower extremity edema     Mild chronic anemia     Obesity     Osteopenia     Sickle cell trait     Thyroid nodule     Type II or unspecified type diabetes mellitus without mention of complication, uncontrolled     Vitamin D deficiency disease        Travel Hx :  N/A    Immunization :  Immunization History   Administered Date(s) Administered    Influenza - Quadrivalent 10/08/2014    Influenza - Quadrivalent - PF 09/28/2016, 12/06/2017, 12/12/2018    Influenza Split 11/26/2012, 01/10/2014    Pneumococcal Conjugate - 13 Valent 05/04/2016    Pneumococcal Polysaccharide - 23 Valent 08/13/2008, 12/06/2017    Tdap 07/24/2009       Family Hx : reviewed  Family History   Problem Relation Age of Onset    Diabetes Mother     Hypertension Mother     Hypertension Father     Prostate cancer Father     Diabetes Brother     Cancer Neg Hx     Breast cancer Neg Hx     Colon cancer Neg Hx     Ovarian cancer Neg Hx     Amblyopia Neg Hx     Blindness Neg Hx     Cataracts Neg Hx     Glaucoma Neg Hx     Macular degeneration Neg Hx     Retinal detachment Neg Hx     Strabismus Neg Hx     Stroke Neg Hx     Thyroid disease Neg Hx        Social Hx : Reviewed  Social History     Socioeconomic History    Marital status:      Spouse name: Not on file    Number of children: 3    Years of education: Not on file    Highest education level: Not on file   Occupational History    Occupation: retired    Social Needs    Financial resource strain: Not on file    Food insecurity:     Worry: Not on file     Inability: Not on file    Transportation needs:     Medical: Not on file     Non-medical: Not on file   Tobacco Use    Smoking status: Never Smoker    Smokeless tobacco: Never Used   Substance and Sexual Activity    Alcohol use: No    Drug use: Not on file    Sexual activity: Not on file   Lifestyle    Physical activity:     Days per week: Not on file     Minutes per session: Not on file    Stress: Not on file   Relationships    Social connections:     Talks on phone: Not on file     Gets together: Not on file     Attends Druze service: Not on file     Active member of club or organization: Not on file     Attends meetings of clubs or organizations: Not on file     Relationship status: Not on file   Other Topics Concern    Not on file   Social History Narrative    Not on file       Surgery :  Right Breast Bx; Lumpectomy Right; Tubal ligation; Complete Hysterectomy; Thyroidectomy.  Colonoscopy in 2014.  EGD in  Aug 2018  Colonoscopy 07/02/1`9    Symptoms :    Review of Systems   Constitutional: Negative for chills, diaphoresis, fever, malaise/fatigue and weight loss.   HENT: Positive for congestion. Negative for hearing loss, nosebleeds, sore throat and tinnitus.    Eyes: Negative for blurred vision, double vision and photophobia.        Wears Glasses   Respiratory: Negative for cough, hemoptysis, sputum production and shortness of breath.    Cardiovascular: Negative.  Negative for chest pain, palpitations, orthopnea, claudication, leg swelling and PND.   Gastrointestinal: Negative for abdominal pain, blood in stool, constipation, diarrhea (Mild), heartburn, nausea and vomiting.        Diarrhoea a lot better.   Genitourinary: Negative.  Negative for dysuria, flank pain, frequency, hematuria and urgency.   Musculoskeletal: Positive for joint pain. Negative for back pain, falls, myalgias and neck  pain.        Mild   Skin: Negative for itching and rash.   Neurological: Negative for dizziness, tingling, tremors, sensory change and headaches.   Endo/Heme/Allergies: Negative for polydipsia. Does not bruise/bleed easily.   Psychiatric/Behavioral: Negative for depression and memory loss. The patient is nervous/anxious (Sometimes) and has insomnia.        Physical Exam :  Nurse Ana present in the room  Physical Exam   Constitutional: She is oriented to person, place, and time and well-developed, well-nourished, and in no distress. No distress.   HENT:   Head: Normocephalic and atraumatic.   Right Ear: External ear normal.   Left Ear: External ear normal.   Nose: Nose normal.   Mouth/Throat: Oropharynx is clear and moist. No oropharyngeal exudate.   Eyes: Pupils are equal, round, and reactive to light. Conjunctivae and EOM are normal. Right eye exhibits no discharge. Left eye exhibits no discharge. No scleral icterus.   Neck: Normal range of motion and full passive range of motion without pain. Neck supple.       Cardiovascular: Normal rate, regular rhythm, S1 normal, normal heart sounds, intact distal pulses and normal pulses. PMI is not displaced.   Pulmonary/Chest: Breath sounds normal. No apnea. Right breast exhibits no inverted nipple, no mass, no nipple discharge, no skin change and no tenderness. Left breast exhibits no inverted nipple, no mass, no nipple discharge, no skin change and no tenderness.       Abdominal: Soft. Normal appearance, normal aorta and bowel sounds are normal. She exhibits no distension, no ascites and no mass. There is no hepatosplenomegaly, splenomegaly or hepatomegaly. There is no tenderness. There is no rebound and no CVA tenderness. No hernia.   Genitourinary:   Genitourinary Comments: Not Examined   Musculoskeletal: Normal range of motion.   Lymphadenopathy:        Head (right side): No submental, no submandibular, no tonsillar, no preauricular, no posterior auricular and no  occipital adenopathy present.        Head (left side): No submental, no submandibular, no tonsillar, no preauricular, no posterior auricular and no occipital adenopathy present.     She has no cervical adenopathy.     She has no axillary adenopathy.        Right: No inguinal, no supraclavicular and no epitrochlear adenopathy present.        Left: No inguinal, no supraclavicular and no epitrochlear adenopathy present.   Neurological: She is alert and oriented to person, place, and time. She has normal motor skills, normal sensation, normal strength, normal reflexes and intact cranial nerves. Gait normal. GCS score is 15.   Skin: Skin is warm, dry and intact. No rash noted. She is not diaphoretic. No cyanosis. No pallor. Nails show no clubbing.   Psychiatric: Mood, memory, affect and judgment normal.       Labs & Imaging :   06/22/19 :  Serum Ferritin  150; serum iron 80; TIBC 293; sat 27 %; hgb 10.7; hct 32.6; MCV 84; platelets 275,000 ANC 6,800    Dx :  Carcinoma Right breast in remission.   Gastritis. Gastric polyp removed. Colon polyp removed      Assessment & Plan:  Reviewed with patient. . OTC Iron One daily.  Get CBc in one month Oncology followup in 4.  months with CBC,CMP,CEA.  Patient understands and verbalised.   Advance Care Planning     Power of   I initiated the process of advance care planning today and explained the importance of this process to the patient.  I introduced the concept of advance directives to the patient, as well. Then the patient received detailed information about the importance of designating a Health Care Power of  (HCPOA). She was also instructed to communicate with this person about their wishes for future healthcare, should she become sick and lose decision-making capacity. The patient has not  previously appointed a HCPOA.          Living Will  During this visit, I engaged the patient in the advance care planning process.  The patient and I reviewed the role for  advance directives and their purpose in directing future healthcare if the patient's unable to speak for him/herself.  At this point in time, the patient does have full decision-making capacity.  We discussed different extreme health states that she could experience, and reviewed what kind of medical care she would want in those situations.  The patient communicated that if she were comatose and had little chance of a meaningful recovery, she  want machines/life-sustaining treatments used.  In addition to the above preference, other important end-of-life issues for the patient .  The patient  HAS NOT completed a living will to reflect these preferences.  The patient   HAS NOT  already designated a healthcare power of  to make decisions on her behalf.       Brochures given

## 2019-07-16 DIAGNOSIS — I10 ESSENTIAL HYPERTENSION: ICD-10-CM

## 2019-07-16 DIAGNOSIS — E78.5 HYPERLIPIDEMIA WITH TARGET LDL LESS THAN 100: ICD-10-CM

## 2019-07-16 RX ORDER — LOVASTATIN 20 MG/1
TABLET ORAL
Qty: 90 TABLET | Refills: 1 | Status: SHIPPED | OUTPATIENT
Start: 2019-07-16 | End: 2019-12-02 | Stop reason: SDUPTHER

## 2019-07-16 RX ORDER — LOSARTAN POTASSIUM 25 MG/1
TABLET ORAL
Qty: 90 TABLET | Refills: 1 | Status: SHIPPED | OUTPATIENT
Start: 2019-07-16 | End: 2019-12-02 | Stop reason: SDUPTHER

## 2019-07-16 RX ORDER — METFORMIN HYDROCHLORIDE 1000 MG/1
TABLET ORAL
Qty: 180 TABLET | Refills: 1 | Status: SHIPPED | OUTPATIENT
Start: 2019-07-16 | End: 2019-12-17 | Stop reason: SINTOL

## 2019-08-07 DIAGNOSIS — I10 ESSENTIAL HYPERTENSION: ICD-10-CM

## 2019-08-08 RX ORDER — HYDROCHLOROTHIAZIDE 25 MG/1
TABLET ORAL
Qty: 90 TABLET | Refills: 1 | Status: SHIPPED | OUTPATIENT
Start: 2019-08-08 | End: 2020-03-19

## 2019-08-15 ENCOUNTER — LAB VISIT (OUTPATIENT)
Dept: LAB | Facility: HOSPITAL | Age: 64
End: 2019-08-15
Attending: INTERNAL MEDICINE
Payer: MEDICARE

## 2019-08-15 DIAGNOSIS — C50.911 MALIGNANT NEOPLASM OF RIGHT BREAST IN FEMALE, ESTROGEN RECEPTOR POSITIVE, UNSPECIFIED SITE OF BREAST: Primary | ICD-10-CM

## 2019-08-15 DIAGNOSIS — Z17.0 MALIGNANT NEOPLASM OF RIGHT BREAST IN FEMALE, ESTROGEN RECEPTOR POSITIVE, UNSPECIFIED SITE OF BREAST: Primary | ICD-10-CM

## 2019-08-15 DIAGNOSIS — D64.9 ANEMIA, UNSPECIFIED TYPE: ICD-10-CM

## 2019-08-15 LAB
BASOPHILS # BLD AUTO: 0.04 K/UL (ref 0–0.2)
BASOPHILS NFR BLD: 0.3 % (ref 0–1.9)
DIFFERENTIAL METHOD: ABNORMAL
EOSINOPHIL # BLD AUTO: 0.1 K/UL (ref 0–0.5)
EOSINOPHIL NFR BLD: 1.2 % (ref 0–8)
ERYTHROCYTE [DISTWIDTH] IN BLOOD BY AUTOMATED COUNT: 14.5 % (ref 11.5–14.5)
HCT VFR BLD AUTO: 32.3 % (ref 37–48.5)
HGB BLD-MCNC: 10.7 G/DL (ref 12–16)
LYMPHOCYTES # BLD AUTO: 3.9 K/UL (ref 1–4.8)
LYMPHOCYTES NFR BLD: 32.5 % (ref 18–48)
MCH RBC QN AUTO: 27.6 PG (ref 27–31)
MCHC RBC AUTO-ENTMCNC: 33.1 G/DL (ref 32–36)
MCV RBC AUTO: 84 FL (ref 82–98)
MONOCYTES # BLD AUTO: 0.6 K/UL (ref 0.3–1)
MONOCYTES NFR BLD: 4.7 % (ref 4–15)
NEUTROPHILS # BLD AUTO: 7.4 K/UL (ref 1.8–7.7)
NEUTROPHILS NFR BLD: 61.3 % (ref 38–73)
PLATELET # BLD AUTO: 308 K/UL (ref 150–350)
PMV BLD AUTO: 10.8 FL (ref 9.2–12.9)
RBC # BLD AUTO: 3.87 M/UL (ref 4–5.4)
WBC # BLD AUTO: 12.03 K/UL (ref 3.9–12.7)

## 2019-08-15 PROCEDURE — 36415 COLL VENOUS BLD VENIPUNCTURE: CPT | Mod: HCNC,PO

## 2019-08-15 PROCEDURE — 85025 COMPLETE CBC W/AUTO DIFF WBC: CPT | Mod: HCNC,PO

## 2019-08-19 ENCOUNTER — TELEPHONE (OUTPATIENT)
Dept: HEMATOLOGY/ONCOLOGY | Facility: CLINIC | Age: 64
End: 2019-08-19

## 2019-08-19 NOTE — TELEPHONE ENCOUNTER
The patient called about her lab results   spoke with patient. Reviewed lab results. Continue monitoring.

## 2019-08-21 ENCOUNTER — TELEPHONE (OUTPATIENT)
Dept: FAMILY MEDICINE | Facility: CLINIC | Age: 64
End: 2019-08-21

## 2019-08-21 NOTE — TELEPHONE ENCOUNTER
How long has she been on the metformin?  Can we see from the pharmacy if we can try the extended release formulation?

## 2019-08-21 NOTE — TELEPHONE ENCOUNTER
Spoke with pt requesting to change metformin rx . Pt states she is experiencing an increase in BM. Pt expressed she barely been able to make it to restroom x3days.   Please advise.

## 2019-08-21 NOTE — TELEPHONE ENCOUNTER
Spoke with patient and she has been on Metformin since 2012. I contact patient's insurance and spoke with Nyasia and she informed me that insurance does cover Metformin extended release but will cost her $8000.00. I contact patient with this information and I also informed her that provider recommend that she be referred to Endo to help better to assist and manage her diabetes. Patient in agreement with referral.

## 2019-08-21 NOTE — TELEPHONE ENCOUNTER
Spoke with patient with charted recommendation. Patient said that Dr Dubose does not treat her for her diabetes. Dr Dubose only treat her for her Thyroid condition. She said that her provider is the only one treating her for her diabetes. She said that Dr Dubose offered and she decline.

## 2019-08-21 NOTE — TELEPHONE ENCOUNTER
Patient sees Dr. Dubose, endocrinology, for her diabetes.  I recommend she contact her for medication change recommendations.

## 2019-10-18 DIAGNOSIS — E11.9 TYPE 2 DIABETES MELLITUS WITHOUT COMPLICATION, UNSPECIFIED WHETHER LONG TERM INSULIN USE: ICD-10-CM

## 2019-12-02 ENCOUNTER — TELEPHONE (OUTPATIENT)
Dept: ENDOCRINOLOGY | Facility: CLINIC | Age: 64
End: 2019-12-02

## 2019-12-02 DIAGNOSIS — I10 ESSENTIAL HYPERTENSION: ICD-10-CM

## 2019-12-02 DIAGNOSIS — E78.5 HYPERLIPIDEMIA WITH TARGET LDL LESS THAN 100: ICD-10-CM

## 2019-12-02 NOTE — TELEPHONE ENCOUNTER
----- Message from Codie Santos sent at 12/2/2019  1:58 PM CST -----  Contact: Self  Type: Patient Call Back    Who called:Masha    What is the request in detail:Patient called to request labs prior to appointment. Please call to advise    Can the clinic reply by [facility]?    Would the patient rather a call back or a response via [facility]? Call    Best call back number: [phone]

## 2019-12-02 NOTE — TELEPHONE ENCOUNTER
Called pt regarding scheduling a lab appt prior to her new pt visit with Sophie. Lab appt scheduled and confirmed for 12/9/19 @9am at the [facility] in [place]

## 2019-12-03 RX ORDER — LOSARTAN POTASSIUM 25 MG/1
TABLET ORAL
Qty: 90 TABLET | Refills: 0 | Status: SHIPPED | OUTPATIENT
Start: 2019-12-03 | End: 2020-07-10

## 2019-12-03 RX ORDER — LOVASTATIN 20 MG/1
TABLET ORAL
Qty: 90 TABLET | Refills: 0 | Status: SHIPPED | OUTPATIENT
Start: 2019-12-03 | End: 2020-05-21

## 2019-12-09 ENCOUNTER — LAB VISIT (OUTPATIENT)
Dept: LAB | Facility: HOSPITAL | Age: 64
End: 2019-12-09
Payer: MEDICARE

## 2019-12-09 LAB
ESTIMATED AVG GLUCOSE: 166 MG/DL (ref 68–131)
HBA1C MFR BLD HPLC: 7.4 % (ref 4–5.6)

## 2019-12-09 PROCEDURE — 36415 COLL VENOUS BLD VENIPUNCTURE: CPT | Mod: HCNC,PO

## 2019-12-09 PROCEDURE — 83036 HEMOGLOBIN GLYCOSYLATED A1C: CPT | Mod: HCNC

## 2019-12-14 ENCOUNTER — PATIENT OUTREACH (OUTPATIENT)
Dept: ADMINISTRATIVE | Facility: OTHER | Age: 64
End: 2019-12-14

## 2019-12-16 ENCOUNTER — LAB VISIT (OUTPATIENT)
Dept: LAB | Facility: HOSPITAL | Age: 64
End: 2019-12-16
Attending: INTERNAL MEDICINE
Payer: MEDICARE

## 2019-12-16 DIAGNOSIS — C50.911 MALIGNANT NEOPLASM OF RIGHT BREAST IN FEMALE, ESTROGEN RECEPTOR POSITIVE, UNSPECIFIED SITE OF BREAST: ICD-10-CM

## 2019-12-16 DIAGNOSIS — D64.9 ANEMIA, UNSPECIFIED TYPE: ICD-10-CM

## 2019-12-16 DIAGNOSIS — Z17.0 MALIGNANT NEOPLASM OF RIGHT BREAST IN FEMALE, ESTROGEN RECEPTOR POSITIVE, UNSPECIFIED SITE OF BREAST: ICD-10-CM

## 2019-12-16 LAB
ALBUMIN SERPL BCP-MCNC: 3.4 G/DL (ref 3.5–5.2)
ALP SERPL-CCNC: 70 U/L (ref 55–135)
ALT SERPL W/O P-5'-P-CCNC: 15 U/L (ref 10–44)
ANION GAP SERPL CALC-SCNC: 10 MMOL/L (ref 8–16)
AST SERPL-CCNC: 16 U/L (ref 10–40)
BILIRUB SERPL-MCNC: 0.6 MG/DL (ref 0.1–1)
BUN SERPL-MCNC: 13 MG/DL (ref 8–23)
CALCIUM SERPL-MCNC: 10.2 MG/DL (ref 8.7–10.5)
CHLORIDE SERPL-SCNC: 106 MMOL/L (ref 95–110)
CO2 SERPL-SCNC: 26 MMOL/L (ref 23–29)
CREAT SERPL-MCNC: 0.8 MG/DL (ref 0.5–1.4)
ERYTHROCYTE [DISTWIDTH] IN BLOOD BY AUTOMATED COUNT: 14.4 % (ref 11.5–14.5)
EST. GFR  (AFRICAN AMERICAN): >60 ML/MIN/1.73 M^2
EST. GFR  (NON AFRICAN AMERICAN): >60 ML/MIN/1.73 M^2
GLUCOSE SERPL-MCNC: 128 MG/DL (ref 70–110)
HCT VFR BLD AUTO: 32.3 % (ref 37–48.5)
HGB BLD-MCNC: 10.9 G/DL (ref 12–16)
IRON SERPL-MCNC: 49 UG/DL (ref 30–160)
MCH RBC QN AUTO: 28.7 PG (ref 27–31)
MCHC RBC AUTO-ENTMCNC: 33.7 G/DL (ref 32–36)
MCV RBC AUTO: 85 FL (ref 82–98)
NEUTROPHILS # BLD AUTO: 7.7 K/UL (ref 1.8–7.7)
PLATELET # BLD AUTO: 276 K/UL (ref 150–350)
PMV BLD AUTO: 11.5 FL (ref 9.2–12.9)
POTASSIUM SERPL-SCNC: 3.5 MMOL/L (ref 3.5–5.1)
PROT SERPL-MCNC: 7 G/DL (ref 6–8.4)
RBC # BLD AUTO: 3.8 M/UL (ref 4–5.4)
SATURATED IRON: 16 % (ref 20–50)
SODIUM SERPL-SCNC: 142 MMOL/L (ref 136–145)
TOTAL IRON BINDING CAPACITY: 311 UG/DL (ref 250–450)
TRANSFERRIN SERPL-MCNC: 210 MG/DL (ref 200–375)
WBC # BLD AUTO: 12.01 K/UL (ref 3.9–12.7)

## 2019-12-16 PROCEDURE — 36415 COLL VENOUS BLD VENIPUNCTURE: CPT | Mod: HCNC,PO

## 2019-12-16 PROCEDURE — 80053 COMPREHEN METABOLIC PANEL: CPT | Mod: HCNC

## 2019-12-16 PROCEDURE — 83540 ASSAY OF IRON: CPT | Mod: HCNC

## 2019-12-16 PROCEDURE — 85027 COMPLETE CBC AUTOMATED: CPT | Mod: HCNC

## 2019-12-17 ENCOUNTER — OFFICE VISIT (OUTPATIENT)
Dept: ENDOCRINOLOGY | Facility: CLINIC | Age: 64
End: 2019-12-17
Payer: MEDICARE

## 2019-12-17 VITALS
SYSTOLIC BLOOD PRESSURE: 127 MMHG | DIASTOLIC BLOOD PRESSURE: 76 MMHG | BODY MASS INDEX: 36.99 KG/M2 | HEIGHT: 66 IN | WEIGHT: 230.13 LBS | HEART RATE: 68 BPM

## 2019-12-17 DIAGNOSIS — E78.5 HYPERLIPIDEMIA, UNSPECIFIED HYPERLIPIDEMIA TYPE: ICD-10-CM

## 2019-12-17 DIAGNOSIS — E66.01 SEVERE OBESITY (BMI 35.0-35.9 WITH COMORBIDITY): ICD-10-CM

## 2019-12-17 DIAGNOSIS — I10 ESSENTIAL HYPERTENSION: ICD-10-CM

## 2019-12-17 DIAGNOSIS — E89.0 HYPOTHYROIDISM, POSTSURGICAL: ICD-10-CM

## 2019-12-17 PROCEDURE — 3008F PR BODY MASS INDEX (BMI) DOCUMENTED: ICD-10-PCS | Mod: HCNC,CPTII,S$GLB, | Performed by: NURSE PRACTITIONER

## 2019-12-17 PROCEDURE — 99999 PR PBB SHADOW E&M-EST. PATIENT-LVL III: CPT | Mod: PBBFAC,HCNC,, | Performed by: NURSE PRACTITIONER

## 2019-12-17 PROCEDURE — 99499 RISK ADDL DX/OHS AUDIT: ICD-10-PCS | Mod: HCNC,S$GLB,, | Performed by: NURSE PRACTITIONER

## 2019-12-17 PROCEDURE — 3078F PR MOST RECENT DIASTOLIC BLOOD PRESSURE < 80 MM HG: ICD-10-PCS | Mod: HCNC,CPTII,S$GLB, | Performed by: NURSE PRACTITIONER

## 2019-12-17 PROCEDURE — 3008F BODY MASS INDEX DOCD: CPT | Mod: HCNC,CPTII,S$GLB, | Performed by: NURSE PRACTITIONER

## 2019-12-17 PROCEDURE — 99204 PR OFFICE/OUTPT VISIT, NEW, LEVL IV, 45-59 MIN: ICD-10-PCS | Mod: HCNC,S$GLB,, | Performed by: NURSE PRACTITIONER

## 2019-12-17 PROCEDURE — 99999 PR PBB SHADOW E&M-EST. PATIENT-LVL III: ICD-10-PCS | Mod: PBBFAC,HCNC,, | Performed by: NURSE PRACTITIONER

## 2019-12-17 PROCEDURE — 3074F SYST BP LT 130 MM HG: CPT | Mod: HCNC,CPTII,S$GLB, | Performed by: NURSE PRACTITIONER

## 2019-12-17 PROCEDURE — 3078F DIAST BP <80 MM HG: CPT | Mod: HCNC,CPTII,S$GLB, | Performed by: NURSE PRACTITIONER

## 2019-12-17 PROCEDURE — 3074F PR MOST RECENT SYSTOLIC BLOOD PRESSURE < 130 MM HG: ICD-10-PCS | Mod: HCNC,CPTII,S$GLB, | Performed by: NURSE PRACTITIONER

## 2019-12-17 PROCEDURE — 99499 UNLISTED E&M SERVICE: CPT | Mod: HCNC,S$GLB,, | Performed by: NURSE PRACTITIONER

## 2019-12-17 PROCEDURE — 99204 OFFICE O/P NEW MOD 45 MIN: CPT | Mod: HCNC,S$GLB,, | Performed by: NURSE PRACTITIONER

## 2019-12-17 RX ORDER — METFORMIN HYDROCHLORIDE 500 MG/1
1000 TABLET, EXTENDED RELEASE ORAL 2 TIMES DAILY WITH MEALS
Qty: 360 TABLET | Refills: 0 | Status: SHIPPED | OUTPATIENT
Start: 2019-12-17 | End: 2020-02-18 | Stop reason: SDUPTHER

## 2019-12-17 NOTE — PROGRESS NOTES
"CC: This 64 y.o. Black or  female  is here for evaluation of  T2DM along with comorbidities indicated in the Visit Diagnosis section of this encounter.    HPI: Masha Garcia was diagnosed with T2DM "a while." Patient is unsure when she was diagnosed.     New to Endocrine.   Patient sees Dr. Elsy Dubose for thryoid. Patient has c/o diarrhea on metformin; she states that Dr. Dubose could actually take care of her DM as well and is unsure why she is being referred here to [facility].     She was told that metformin ER would be $8k.     C/o cost of Tradjenta. She is in the prescription gap and usually falls into mid year.     LAST DIABETES EDUCATION: upon diagnosis     HOSPITALIZED FOR DIABETES OR RELATED COMPLICATIONS -  No.    PRESCRIBED DIABETES MEDICATIONS: glipizide XL 10 mg BID, metformin IR 1000 mg BID, Tradjenta 5 mg once daily     Misses medication doses - No    DM COMPLICATIONS: none    SIGNIFICANT DIABETES MED HISTORY: diarrhea on metformin instant release.     SELF MONITORING BLOOD GLUCOSE: Checks blood glucose at home - very infrequent     HYPOGLYCEMIC EPISODES: denies but she does get diaphoretic over night; has never confirmed with BG reading     CURRENT DIET: drinks regular sodas (2 cans/day). Eats 3 meals/day. She believes her diet is pretty healthy.   Diet recall: dinner was 1 slice of bread, sausage, 1/2 soda; lunch was fried chicken wing with fried rice at a party. Breakfast was sausage biscuit with egg.     CURRENT EXERCISE: she has a walking tape 1/2 hour that she does 1-2x/week.       /76 (BP Location: Left arm, Patient Position: Sitting, BP Method: X-Large (Automatic))   Pulse 68   Ht 5' 6" (1.676 m)   Wt 104.4 kg (230 lb 1.6 oz)   BMI 37.14 kg/m²       ROS:   CONSTITUTIONAL: Appetite good, denies fatigue  SKIN: No rash or pruritis   EYES: No visual disturbances  RESPIRATORY: No shortness of breath or cough  CARDIAC: No chest pain or palpitations  GI: No " nausea, vomiting; + urgent frequent diarrhea - worse when she eats certain foods or if she gets upset   : No urinary frequency or dysuria   MS: + right foot pain upon ambulation    NEURO: No paresthesias or tremors.   OTHER: + night sweats         PHYSICAL EXAM:  GENERAL: Well developed, well nourished. No acute distress.   PSYCH: AAOx3, appropriate mood and affect, conversant, well-groomed. Judgement and insight good.   NEURO: Cranial nerves grossly intact. Speech clear, no tremor.   NECK: Trachea midline, no lymphadenopathy. S/p thyroidectomy   CHEST: Respirations even and unlabored. CTA bilaterally.  CARDIOVASCULAR: Regular rate and rhythm. No bruits. No murmur. No edema.   ABDOMEN: Soft, non-tender, non-distended. Bowel sounds present.   MS: Gait steady. No clubbing.   SKIN: Normal skin turgor. Skin warm and dry. No areas of breakdown. + nuchal acanthosis nigricans.  FEET: Footware appropriate.     Protective Sensation (w/ 10 gram monofilament):  Right: Intact  Left: Intact    Visual Inspection:  Skin Breakdown -  Neither   Low arches     Pedal Pulses:   Right: Present  Left: Present    Posterior tibialis:   Right:Present  Left: Present        Hemoglobin A1C   Date Value Ref Range Status   12/09/2019 7.4 (H) 4.0 - 5.6 % Final     Comment:     ADA Screening Guidelines:  5.7-6.4%  Consistent with prediabetes  >or=6.5%  Consistent with diabetes  High levels of fetal hemoglobin interfere with the HbA1C  assay. Heterozygous hemoglobin variants (HbS, HgC, etc)do  not significantly interfere with this assay.   However, presence of multiple variants may affect accuracy.     06/22/2019 7.5 (H) 4.0 - 5.6 % Final     Comment:     ADA Screening Guidelines:  5.7-6.4%  Consistent with prediabetes  >or=6.5%  Consistent with diabetes  High levels of fetal hemoglobin interfere with the HbA1C  assay. Heterozygous hemoglobin variants (HbS, HgC, etc)do  not significantly interfere with this assay.   However, presence of multiple  variants may affect accuracy.     12/12/2018 7.6 (H) 4.0 - 5.6 % Final     Comment:     ADA Screening Guidelines:  5.7-6.4%  Consistent with prediabetes  >or=6.5%  Consistent with diabetes  High levels of fetal hemoglobin interfere with the HbA1C  assay. Heterozygous hemoglobin variants (HbS, HgC, etc)do  not significantly interfere with this assay.   However, presence of multiple variants may affect accuracy.             Chemistry        Component Value Date/Time     12/16/2019 0911    K 3.5 12/16/2019 0911     12/16/2019 0911    CO2 26 12/16/2019 0911    BUN 13 12/16/2019 0911    CREATININE 0.8 12/16/2019 0911     (H) 12/16/2019 0911        Component Value Date/Time    CALCIUM 10.2 12/16/2019 0911    ALKPHOS 70 12/16/2019 0911    AST 16 12/16/2019 0911    ALT 15 12/16/2019 0911    BILITOT 0.6 12/16/2019 0911    ESTGFRAFRICA >60.0 12/16/2019 0911    EGFRNONAA >60.0 12/16/2019 0911          Lab Results   Component Value Date    LDLCALC 61.2 (L) 06/22/2019        Ref. Range 6/22/2019 09:30   Cholesterol Latest Ref Range: 120 - 199 mg/dL 128   HDL Latest Ref Range: 40 - 75 mg/dL 48   Hdl/Cholesterol Ratio Latest Ref Range: 20.0 - 50.0 % 37.5   LDL Cholesterol External Latest Ref Range: 63.0 - 159.0 mg/dL 61.2 (L)   Non-HDL Cholesterol Latest Units: mg/dL 80   Total Cholesterol/HDL Ratio Latest Ref Range: 2.0 - 5.0  2.7   Triglycerides Latest Ref Range: 30 - 150 mg/dL 94       Lab Results   Component Value Date    MICALBCREAT 5.9 06/24/2019             ASSESSMENT and PLAN:    A1C GOAL: < 7 %     1. Uncontrolled type 2 diabetes mellitus without complication  Avoid beverages with sugar.     Switch to metformin ER (extended release) 500 mg tablets. Take 2 tablets twice daily with food.   To avoid diarrhea from metformin, can try 7-13 grams/day of psyllium fiber like sugar-free Metamucil and take metformin with food. May need to reduce to 3 or 2 tablets per day of metformin if still having diarrhea  despite fiber intake.     Continue glipizide XL 10 mg twice daily as well as Tradjenta; may consider stopping Tradjenta due to high cost if blood sugars start falling less than 80 with better diet and exercise.     See Diabetes Educator/Registered Dietician for Medical Nutrition Therapy.   Start exercise regimen. Try walking video or other exercise for 1/2 hour 5 days a week.     Test blood sugar 1-2x/day and keep a log.     Return to clinic as needed. She will follow up with Dr. Dubose around January for both thyroid and diabetes.         Ambulatory Referral to Diabetes Education   2. Essential hypertension  controlled   3. Severe obesity (BMI 35.0-35.9 with comorbidity)  Increases insulin resistance.      4. Hypothyroidism, postsurgical  As above   5. Hyperlipidemia, unspecified hyperlipidemia type  Continue atorvastatin        Orders Placed This Encounter   Procedures    Ambulatory Referral to Diabetes Education     Standing Status:   Future     Standing Expiration Date:   12/17/2020     Referral Priority:   Routine     Referral Type:   Consultation     Referral Reason:   Specialty Services Required     Requested Specialty:   Endocrinology     Number of Visits Requested:   1     Expiration Date:   12/17/2020        No follow-ups on file.     Thank you very much for allowing me to participate in Masha Garcia's care.

## 2019-12-17 NOTE — PATIENT INSTRUCTIONS
Avoid beverages with sugar.     Switch to metformin ER (extended release) 500 mg tablets. Take 2 tablets twice daily with food.   To avoid diarrhea from metformin, can try 7-13 grams/day of psyllium fiber like sugar-free Metamucil and take metformin with food. May need to reduce to 3 or 2 tablets per day of metformin if still having diarrhea despite fiber intake.     Continue glipizide XL 10 mg twice daily as well as Tradjenta; may consider stopping Tradjenta due to high cost if blood sugars start falling less than 80 with better diet and exercise.     See Diabetes Educator/Registered Dietician for Medical Nutrition Therapy.   Start exercise regimen. Try walking video or other exercise for 1/2 hour 5 days a week.     Test blood sugar 1-2x/day and keep a log.     Return to clinic as needed. She will follow up with Dr. Dubose around January for both thyroid and diabetes.

## 2019-12-17 NOTE — LETTER
December 17, 2019      Brenda Bailey MD  [address] [place] [address]           [facility]  [address]  [place] [address]  Phone: [phone]  Fax: [phone]          Patient: Masha Garcia   MR Number: [id number]   YOB: 1955   Date of Visit: 12/17/2019       Dear Dr. Brenda Bailey:    Thank you for referring Masha Garcia to me for evaluation. Attached you will find relevant portions of my assessment and plan of care.    If you have questions, please do not hesitate to call me. I look forward to following Masha Garcia along with you.    Sincerely,    Sophie Medley NP    Enclosure  CC:  No Recipients    If you would like to receive this communication electronically, please contact [email or web address] or [phone] to request more information on [facility] access.    For providers and/or their staff who would like to refer a patient to [facility], please contact us through our one-stop-shop provider referral line, [facility], at [phone].    If you feel you have received this communication in error or would no longer like to receive these types of communications, please e-mail [email or web address]

## 2019-12-17 NOTE — PROGRESS NOTES
Chief Complaint :   Breast Cancer    Hx of Present illness :  Patient is a 64 y.o. year old female who presents to the clinic  for oncology followup. dx'd to have Carcinoma Right Breast  In . Lumpectomy and radiation therapy followed by adjuvant Arimidex therapy. Breast cancer Index did not show  Any benefit with extended harmonal therapy.  Completed arimidex therapy in 2018. Since last visit has had colonoscopic exam on 19 : documented to have a single sessile polyp in the ascending colon. polypectpomy done. Documented to have Grade 2 Internal hemmorrhoids.  Allergies :    Review of patient's allergies indicates:   Allergen Reactions    Ace inhibitors Other (See Comments)     cough    Penicillins Hives       Occupation : retired .    Transfusion :  None    Menstrual & obstetric Hx :  3; para 3.  Age of menarche: 12  Age of first pregnancy: 18  Lactation history: None  Age of menopause:  Hysterectomy in her late 40's for bleeding  HRT:  For many years.    Present Meds :   Medication List with Changes/Refills   Current Medications    CALCIUM CARBONATE-VITAMIN D3 (CALCIUM 600 + D,3,) 600 MG CALCIUM- 200 UNIT CAP    Take 1 tablet by mouth every morning.     GLIPIZIDE (GLUCOTROL) 10 MG TR24    TAKE 1 TABLET BY MOUTH TWICE DAILY    HYDROCHLOROTHIAZIDE (HYDRODIURIL) 25 MG TABLET    TAKE 1 TABLET EVERY DAY    LEVOTHYROXINE (SYNTHROID) 125 MCG TABLET    Take 125 mcg by mouth once daily.    LINAGLIPTIN (TRADJENTA) 5 MG TAB TABLET    Take 1 tablet (5 mg total) by mouth once daily.    LORATADINE (CLARITIN) 10 MG TABLET    Take 10 mg by mouth daily as needed.     LOSARTAN (COZAAR) 25 MG TABLET    TAKE 1 TABLET EVERY DAY    LOVASTATIN (MEVACOR) 20 MG TABLET    TAKE 1 TABLET EVERY DAY    METFORMIN (GLUCOPHAGE-XR) 500 MG 24 HR TABLET    Take 2 tablets (1,000 mg total) by mouth 2 (two) times daily with meals.       Past Medical Hx :  Carcinoma right breast. Diabetes Mellitus type 2;  Hypertension; hyperlipidemia; Allergic rhinitis; osteopenia; sickle cell trait; Thyroid nodule. No Hx of hepatitis, TB, Sarcoid, Lupus, rheumatoid arthritis, seizure disorder, CVA, PE or DVT. No prior Hx of Chemotherapy    Past Medical Hx :  Past Medical History:   Diagnosis Date    AR (allergic rhinitis)     Breast cancer 06/2013    right; followed by oncology, Dr. Oliveira; s/p lumpectomy and  radiation    History of colonic polyps     Hyperlipidemia LDL goal < 100     Hypertension     Lower extremity edema     Mild chronic anemia     Obesity     Osteopenia     Sickle cell trait     Thyroid nodule     Type II or unspecified type diabetes mellitus without mention of complication, uncontrolled     Vitamin D deficiency disease        Travel Hx :  N/A    Immunization :  Immunization History   Administered Date(s) Administered    Influenza - Quadrivalent 10/08/2014    Influenza - Quadrivalent - PF (6 months and older) 09/28/2016, 12/06/2017, 12/12/2018    Influenza Split 11/26/2012, 01/10/2014    Pneumococcal Conjugate - 13 Valent 05/04/2016    Pneumococcal Polysaccharide - 23 Valent 08/13/2008, 12/06/2017    Tdap 07/24/2009       Family Hx : reviewed  Family History   Problem Relation Age of Onset    Diabetes Mother     Hypertension Mother     Hypertension Father     Prostate cancer Father     Diabetes Brother     Cancer Neg Hx     Breast cancer Neg Hx     Colon cancer Neg Hx     Ovarian cancer Neg Hx     Amblyopia Neg Hx     Blindness Neg Hx     Cataracts Neg Hx     Glaucoma Neg Hx     Macular degeneration Neg Hx     Retinal detachment Neg Hx     Strabismus Neg Hx     Stroke Neg Hx     Thyroid disease Neg Hx        Social Hx : Reviewed  Social History     Socioeconomic History    Marital status:      Spouse name: Not on file    Number of children: 3    Years of education: Not on file    Highest education level: Not on file   Occupational History    Occupation: retired    Social Needs    Financial resource strain: Not on file    Food insecurity:     Worry: Not on file     Inability: Not on file    Transportation needs:     Medical: Not on file     Non-medical: Not on file   Tobacco Use    Smoking status: Never Smoker    Smokeless tobacco: Never Used   Substance and Sexual Activity    Alcohol use: No    Drug use: Not on file    Sexual activity: Not on file   Lifestyle    Physical activity:     Days per week: Not on file     Minutes per session: Not on file    Stress: Not on file   Relationships    Social connections:     Talks on phone: Not on file     Gets together: Not on file     Attends [employer] service: Not on file     Active member of club or organization: Not on file     Attends meetings of clubs or organizations: Not on file     Relationship status: Not on file   Other Topics Concern    Not on file   Social History Narrative    Not on file       Surgery :  Right Breast Bx; Lumpectomy Right; Tubal ligation; Complete Hysterectomy; Thyroidectomy.  Colonoscopy in 2014.  EGD in  Aug 2018  Colonoscopy 07/02/1`9    Symptoms :    Review of Systems   Constitutional: Negative for chills, diaphoresis, fever, malaise/fatigue and weight loss.   HENT: Positive for congestion. Negative for hearing loss, nosebleeds, sore throat and tinnitus.    Eyes: Negative for blurred vision, double vision and photophobia.        Wears Glasses   Respiratory: Negative for cough, hemoptysis, sputum production and shortness of breath.    Cardiovascular: Negative.  Negative for chest pain, palpitations, orthopnea, claudication, leg swelling and PND.   Gastrointestinal: Positive for diarrhea (Mild). Negative for abdominal pain, blood in stool, constipation, heartburn, nausea and vomiting.        Diarrhoea  Related to Metgormin   Genitourinary: Negative.  Negative for dysuria, flank pain, frequency, hematuria and urgency.   Musculoskeletal: Positive for joint pain. Negative for back pain, falls,  myalgias and neck pain.        Mild   Skin: Negative for itching and rash.   Neurological: Negative for dizziness, tingling, tremors, sensory change and headaches.   Endo/Heme/Allergies: Negative for polydipsia. Does not bruise/bleed easily.   Psychiatric/Behavioral: Negative for depression and memory loss. The patient is nervous/anxious (Sometimes) and has insomnia.        Physical Exam :  Nurse Ana present in the room  Physical Exam   Constitutional: She is oriented to person, place, and time and well-developed, well-nourished, and in no distress. No distress.   HENT:   Head: Normocephalic and atraumatic.   Right Ear: External ear normal.   Left Ear: External ear normal.   Nose: Nose normal.   Mouth/Throat: Oropharynx is clear and moist. No oropharyngeal exudate.   Eyes: Pupils are equal, round, and reactive to light. Conjunctivae and EOM are normal. Right eye exhibits no discharge. Left eye exhibits no discharge. No scleral icterus.   Neck: Normal range of motion and full passive range of motion without pain. Neck supple.       Cardiovascular: Normal rate, regular rhythm, S1 normal, normal heart sounds, intact distal pulses and normal pulses. PMI is not displaced.   Pulmonary/Chest: Breath sounds normal. No apnea. Right breast exhibits no inverted nipple, no mass, no nipple discharge, no skin change and no tenderness. Left breast exhibits no inverted nipple, no mass, no nipple discharge, no skin change and no tenderness.       Abdominal: Soft. Normal appearance, normal aorta and bowel sounds are normal. She exhibits no distension, no ascites and no mass. There is no hepatosplenomegaly, splenomegaly or hepatomegaly. There is no tenderness. There is no rebound and no CVA tenderness. No hernia.   Genitourinary:   Genitourinary Comments: Not Examined   Musculoskeletal: Normal range of motion.   Lymphadenopathy:        Head (right side): No submental, no submandibular, no tonsillar, no preauricular, no posterior  auricular and no occipital adenopathy present.        Head (left side): No submental, no submandibular, no tonsillar, no preauricular, no posterior auricular and no occipital adenopathy present.     She has no cervical adenopathy.     She has no axillary adenopathy.        Right: No inguinal, no supraclavicular and no epitrochlear adenopathy present.        Left: No inguinal, no supraclavicular and no epitrochlear adenopathy present.   Neurological: She is alert and oriented to person, place, and time. She has normal motor skills, normal sensation, normal strength, normal reflexes and intact cranial nerves. Gait normal. GCS score is 15.   Skin: Skin is warm, dry and intact. No rash noted. She is not diaphoretic. No cyanosis. No pallor. Nails show no clubbing.   Psychiatric: Mood, memory, affect and judgment normal.       Labs & Imaging :   12/16/19 : Hgb 10.9; Hct 32.3; MCV 85; Platelets 276,000 ANC 7,700. Serum iron 49; TIBC 311; sat 16 %; NFBS 128; Cr.0.8; ca 10.2; bili 0.6  Liver enzymes normal. eFR . 60.     Dx :  Carcinoma Right breast in remission. Chronic anemia.         Assessment & Plan:  Reviewed with patient. . OTC Iron One daily.   Oncology followup in 4.  months with CBC,CMP,CEA.  Patient understands and verbalised.   Advance Care Planning     Power of   I initiated the process of advance care planning today and explained the importance of this process to the patient.  I introduced the concept of advance directives to the patient, as well. Then the patient received detailed information about the importance of designating a Health Care Power of  (HCPOA). She was also instructed to communicate with this person about their wishes for future healthcare, should she become sick and lose decision-making capacity. The patient has not  previously appointed a HCPOA.          Living Will  During this visit, I engaged the patient in the advance care planning process.  The patient and I reviewed the  role for advance directives and their purpose in directing future healthcare if the patient's unable to speak for him/herself.  At this point in time, the patient does have full decision-making capacity.  We discussed different extreme health states that she could experience, and reviewed what kind of medical care she would want in those situations.  The patient communicated that if she were comatose and had little chance of a meaningful recovery, she  want machines/life-sustaining treatments used.  In addition to the above preference, other important end-of-life issues for the patient .  The patient  HAS NOT completed a living will to reflect these preferences.  The patient   HAS NOT  already designated a healthcare power of  to make decisions on her behalf.       Brochures given

## 2019-12-18 ENCOUNTER — OFFICE VISIT (OUTPATIENT)
Dept: HEMATOLOGY/ONCOLOGY | Facility: CLINIC | Age: 64
End: 2019-12-18
Payer: MEDICARE

## 2019-12-18 VITALS
WEIGHT: 232.38 LBS | SYSTOLIC BLOOD PRESSURE: 126 MMHG | OXYGEN SATURATION: 99 % | HEIGHT: 67 IN | BODY MASS INDEX: 36.47 KG/M2 | DIASTOLIC BLOOD PRESSURE: 77 MMHG | HEART RATE: 72 BPM | TEMPERATURE: 98 F

## 2019-12-18 DIAGNOSIS — D64.9 ANEMIA, UNSPECIFIED TYPE: ICD-10-CM

## 2019-12-18 DIAGNOSIS — C50.911 MALIGNANT NEOPLASM OF RIGHT BREAST IN FEMALE, ESTROGEN RECEPTOR POSITIVE, UNSPECIFIED SITE OF BREAST: Primary | ICD-10-CM

## 2019-12-18 DIAGNOSIS — Z17.0 MALIGNANT NEOPLASM OF RIGHT BREAST IN FEMALE, ESTROGEN RECEPTOR POSITIVE, UNSPECIFIED SITE OF BREAST: Primary | ICD-10-CM

## 2019-12-18 PROCEDURE — 3078F PR MOST RECENT DIASTOLIC BLOOD PRESSURE < 80 MM HG: ICD-10-PCS | Mod: HCNC,CPTII,S$GLB, | Performed by: INTERNAL MEDICINE

## 2019-12-18 PROCEDURE — 99999 PR PBB SHADOW E&M-EST. PATIENT-LVL III: CPT | Mod: PBBFAC,HCNC,, | Performed by: INTERNAL MEDICINE

## 2019-12-18 PROCEDURE — 3074F PR MOST RECENT SYSTOLIC BLOOD PRESSURE < 130 MM HG: ICD-10-PCS | Mod: HCNC,CPTII,S$GLB, | Performed by: INTERNAL MEDICINE

## 2019-12-18 PROCEDURE — 3008F BODY MASS INDEX DOCD: CPT | Mod: HCNC,CPTII,S$GLB, | Performed by: INTERNAL MEDICINE

## 2019-12-18 PROCEDURE — 3074F SYST BP LT 130 MM HG: CPT | Mod: HCNC,CPTII,S$GLB, | Performed by: INTERNAL MEDICINE

## 2019-12-18 PROCEDURE — 99213 OFFICE O/P EST LOW 20 MIN: CPT | Mod: HCNC,S$GLB,, | Performed by: INTERNAL MEDICINE

## 2019-12-18 PROCEDURE — 3078F DIAST BP <80 MM HG: CPT | Mod: HCNC,CPTII,S$GLB, | Performed by: INTERNAL MEDICINE

## 2019-12-18 PROCEDURE — 99213 PR OFFICE/OUTPT VISIT, EST, LEVL III, 20-29 MIN: ICD-10-PCS | Mod: HCNC,S$GLB,, | Performed by: INTERNAL MEDICINE

## 2019-12-18 PROCEDURE — 3008F PR BODY MASS INDEX (BMI) DOCUMENTED: ICD-10-PCS | Mod: HCNC,CPTII,S$GLB, | Performed by: INTERNAL MEDICINE

## 2019-12-18 PROCEDURE — 99999 PR PBB SHADOW E&M-EST. PATIENT-LVL III: ICD-10-PCS | Mod: PBBFAC,HCNC,, | Performed by: INTERNAL MEDICINE

## 2020-01-09 ENCOUNTER — PATIENT OUTREACH (OUTPATIENT)
Dept: ADMINISTRATIVE | Facility: OTHER | Age: 65
End: 2020-01-09

## 2020-01-28 RX ORDER — GLIPIZIDE 10 MG/1
TABLET, FILM COATED, EXTENDED RELEASE ORAL
Qty: 180 TABLET | Refills: 0 | Status: SHIPPED | OUTPATIENT
Start: 2020-01-28 | End: 2020-05-08

## 2020-02-04 ENCOUNTER — OFFICE VISIT (OUTPATIENT)
Dept: FAMILY MEDICINE | Facility: CLINIC | Age: 65
End: 2020-02-04
Payer: MEDICARE

## 2020-02-04 VITALS
TEMPERATURE: 98 F | WEIGHT: 231.69 LBS | HEIGHT: 66 IN | BODY MASS INDEX: 37.23 KG/M2 | OXYGEN SATURATION: 95 % | DIASTOLIC BLOOD PRESSURE: 62 MMHG | SYSTOLIC BLOOD PRESSURE: 114 MMHG | HEART RATE: 64 BPM

## 2020-02-04 DIAGNOSIS — E28.310 SYMPTOMATIC PREMATURE MENOPAUSE: ICD-10-CM

## 2020-02-04 DIAGNOSIS — Z23 NEED FOR IMMUNIZATION AGAINST INFLUENZA: ICD-10-CM

## 2020-02-04 DIAGNOSIS — Z85.3 HX: BREAST CANCER: ICD-10-CM

## 2020-02-04 DIAGNOSIS — M85.89 OSTEOPENIA OF MULTIPLE SITES: ICD-10-CM

## 2020-02-04 DIAGNOSIS — E55.9 VITAMIN D DEFICIENCY DISEASE: ICD-10-CM

## 2020-02-04 DIAGNOSIS — D64.9 ANEMIA, UNSPECIFIED TYPE: Primary | ICD-10-CM

## 2020-02-04 DIAGNOSIS — I10 ESSENTIAL HYPERTENSION: ICD-10-CM

## 2020-02-04 DIAGNOSIS — M89.9 DISORDER OF BONE AND CARTILAGE: ICD-10-CM

## 2020-02-04 DIAGNOSIS — M94.9 DISORDER OF BONE AND CARTILAGE: ICD-10-CM

## 2020-02-04 DIAGNOSIS — E66.01 SEVERE OBESITY (BMI 35.0-35.9 WITH COMORBIDITY): ICD-10-CM

## 2020-02-04 DIAGNOSIS — E78.5 HYPERLIPIDEMIA WITH TARGET LDL LESS THAN 100: ICD-10-CM

## 2020-02-04 DIAGNOSIS — E89.0 HYPOTHYROIDISM, POSTSURGICAL: ICD-10-CM

## 2020-02-04 DIAGNOSIS — D57.3 SICKLE CELL TRAIT: ICD-10-CM

## 2020-02-04 PROCEDURE — 3078F DIAST BP <80 MM HG: CPT | Mod: HCNC,CPTII,S$GLB, | Performed by: NURSE PRACTITIONER

## 2020-02-04 PROCEDURE — 3074F SYST BP LT 130 MM HG: CPT | Mod: HCNC,CPTII,S$GLB, | Performed by: NURSE PRACTITIONER

## 2020-02-04 PROCEDURE — G0008 FLU VACCINE (QUAD) GREATER THAN OR EQUAL TO 3YO PRESERVATIVE FREE IM: ICD-10-PCS | Mod: HCNC,S$GLB,, | Performed by: NURSE PRACTITIONER

## 2020-02-04 PROCEDURE — 99499 UNLISTED E&M SERVICE: CPT | Mod: S$GLB,,, | Performed by: NURSE PRACTITIONER

## 2020-02-04 PROCEDURE — 99999 PR PBB SHADOW E&M-EST. PATIENT-LVL V: CPT | Mod: PBBFAC,HCNC,, | Performed by: NURSE PRACTITIONER

## 2020-02-04 PROCEDURE — 3078F PR MOST RECENT DIASTOLIC BLOOD PRESSURE < 80 MM HG: ICD-10-PCS | Mod: HCNC,CPTII,S$GLB, | Performed by: NURSE PRACTITIONER

## 2020-02-04 PROCEDURE — 3008F PR BODY MASS INDEX (BMI) DOCUMENTED: ICD-10-PCS | Mod: HCNC,CPTII,S$GLB, | Performed by: NURSE PRACTITIONER

## 2020-02-04 PROCEDURE — 99214 OFFICE O/P EST MOD 30 MIN: CPT | Mod: 25,HCNC,S$GLB, | Performed by: NURSE PRACTITIONER

## 2020-02-04 PROCEDURE — 99214 PR OFFICE/OUTPT VISIT, EST, LEVL IV, 30-39 MIN: ICD-10-PCS | Mod: 25,HCNC,S$GLB, | Performed by: NURSE PRACTITIONER

## 2020-02-04 PROCEDURE — 3074F PR MOST RECENT SYSTOLIC BLOOD PRESSURE < 130 MM HG: ICD-10-PCS | Mod: HCNC,CPTII,S$GLB, | Performed by: NURSE PRACTITIONER

## 2020-02-04 PROCEDURE — 90686 FLU VACCINE (QUAD) GREATER THAN OR EQUAL TO 3YO PRESERVATIVE FREE IM: ICD-10-PCS | Mod: HCNC,S$GLB,, | Performed by: NURSE PRACTITIONER

## 2020-02-04 PROCEDURE — 90686 IIV4 VACC NO PRSV 0.5 ML IM: CPT | Mod: HCNC,S$GLB,, | Performed by: NURSE PRACTITIONER

## 2020-02-04 PROCEDURE — 99999 PR PBB SHADOW E&M-EST. PATIENT-LVL V: ICD-10-PCS | Mod: PBBFAC,HCNC,, | Performed by: NURSE PRACTITIONER

## 2020-02-04 PROCEDURE — G0008 ADMIN INFLUENZA VIRUS VAC: HCPCS | Mod: HCNC,S$GLB,, | Performed by: NURSE PRACTITIONER

## 2020-02-04 PROCEDURE — 99499 RISK ADDL DX/OHS AUDIT: ICD-10-PCS | Mod: S$GLB,,, | Performed by: NURSE PRACTITIONER

## 2020-02-04 PROCEDURE — 3008F BODY MASS INDEX DOCD: CPT | Mod: HCNC,CPTII,S$GLB, | Performed by: NURSE PRACTITIONER

## 2020-02-04 NOTE — PATIENT INSTRUCTIONS
Flu shot today  Schedule bone dexa scan  Fasting labs one week before physical with Dr. Bailey after 6/24/2019  Continue all current medications

## 2020-02-04 NOTE — PROGRESS NOTES
Subjective:       Patient ID: Masha Garcia is a 64 y.o. female.    Chief Complaint: Hyperlipidemia (F/U); Hypertension (F/U); and Diabetes (F/U)    64-year-old female presents to the clinic today with follow-up on diabetes, hypertension, and hyperlipidemia.  She states her blood sugar this morning was 117.  She says her blood sugars in the morning run around 115-125.  She has gotten blood sugars as high as 300 after eating.  She has fair dietary habits.  She does a walking exercise tape 3 times a week for 30 minites at a time.  She is compliant with all of her medications.  She denies any headaches, dizziness, or blurred vision.  She denies any cardiac chest pain, heart palpitations, shortness of breath, or swelling to lower extremities.  She is due for an eye exam.  She has seen Dr. Lindsay in the past.  She is due for DEXA scan.  She will get her flu shot today.  On December 9, 2019 her hemoglobin A1c was 7.4.    Review of Systems   Constitutional: Negative for activity change.   Respiratory: Negative for cough, chest tightness, shortness of breath and wheezing.    Cardiovascular: Negative for chest pain, palpitations and leg swelling.   Gastrointestinal: Negative for abdominal pain, constipation, diarrhea, nausea and vomiting.   Musculoskeletal: Negative for gait problem.   Skin: Negative for color change.   Neurological: Negative for dizziness, syncope and light-headedness.       Objective:      Physical Exam   Constitutional: She is oriented to person, place, and time. She appears well-developed and well-nourished. No distress.   Eyes: Pupils are equal, round, and reactive to light. Conjunctivae and EOM are normal. Right eye exhibits no discharge. Left eye exhibits no discharge. No scleral icterus.   Neck: Normal range of motion. Neck supple. No JVD present.   Cardiovascular: Normal rate, regular rhythm and normal heart sounds.   No murmur heard.  Pulmonary/Chest: Effort normal and breath sounds normal. No  respiratory distress. She has no wheezes. She has no rales.   Abdominal: Soft. Bowel sounds are normal. There is no tenderness.   Musculoskeletal: Normal range of motion. She exhibits no edema.   Protective Sensation (w/ 10 gram monofilament):  Right: Intact  Left: Intact    Visual Inspection:  Dry Skin -  Bilateral    Pedal Pulses:   Right: Present  Left: Present    Posterior tibialis:   Right:Present  Left: Present     Neurological: She is alert and oriented to person, place, and time.   Skin: Skin is warm and dry. She is not diaphoretic.   Psychiatric: She has a normal mood and affect.       Assessment:       1. Anemia, unspecified type    2. HX: breast cancer    3. Hyperlipidemia with target LDL less than 100    4. Essential hypertension    5. Hypothyroidism, postsurgical    6. Osteopenia of multiple sites    7. Sickle cell trait    8. Severe obesity (BMI 35.0-35.9 with comorbidity)    9. Uncontrolled type 2 diabetes mellitus without complication    10. Vitamin D deficiency disease    11. Disorder of bone and cartilage    12. Need for immunization against influenza    13. Uncontrolled type 2 diabetes mellitus without complication, without long-term current use of insulin    14. Symptomatic premature menopause         Plan:         Anemia, unspecified type  -     CBC auto differential; Future; Expected date: 02/04/2020    HX: breast cancer  - followed by Dr. Oliveira    Hyperlipidemia with target LDL less than 100  -     Comprehensive metabolic panel; Future; Expected date: 02/04/2020  -     Lipid panel; Future; Expected date: 02/04/2020  - The current medical regimen is effective;  continue present plan and medications.    Essential hypertension  - The current medical regimen is effective;  continue present plan and medications.    Hypothyroidism, postsurgical  -     TSH; Future; Expected date: 02/04/2020  - The current medical regimen is effective;  continue present plan and medications.    Osteopenia of multiple  sites  - check Dexa bone scan    Sickle cell trait  - stable, observe, asymptomatic     Severe obesity (BMI 35.0-35.9 with comorbidity)  - The patient is asked to make an attempt to improve diet and exercise patterns to aid in medical management of this problem.    Uncontrolled type 2 diabetes mellitus without complication  -     Ambulatory referral/consult to Optometry; Future; Expected date: 02/11/2020  - continue current medicaitons    Vitamin D deficiency disease  - The current medical regimen is effective;  continue present plan and medications.    Disorder of bone and cartilage  -     DXA Bone Density Spine And Hip; Future; Expected date: 02/04/2020  -     Vitamin D; Future; Expected date: 02/04/2020    Need for immunization against influenza  -     Influenza - Quadrivalent (6 months+) (PF)    Uncontrolled type 2 diabetes mellitus without complication, without long-term current use of insulin  -     linaGLIPtin (TRADJENTA) 5 mg Tab tablet; Take 1 tablet (5 mg total) by mouth once daily.  Dispense: 30 tablet; Refill: 5  -     Hemoglobin A1c; Future; Expected date: 02/04/2020  -     Microalbumin/creatinine urine ratio; Future; Expected date: 02/04/2020    Symptomatic premature menopause   -     DXA Bone Density Spine And Hip; Future; Expected date: 02/04/2020

## 2020-02-12 ENCOUNTER — HOSPITAL ENCOUNTER (OUTPATIENT)
Dept: RADIOLOGY | Facility: HOSPITAL | Age: 65
Discharge: HOME | End: 2020-02-12
Attending: NURSE PRACTITIONER
Payer: MEDICARE

## 2020-02-12 ENCOUNTER — TELEPHONE (OUTPATIENT)
Dept: FAMILY MEDICINE | Facility: CLINIC | Age: 65
End: 2020-02-12

## 2020-02-12 DIAGNOSIS — E28.310 SYMPTOMATIC PREMATURE MENOPAUSE: ICD-10-CM

## 2020-02-12 DIAGNOSIS — M94.9 DISORDER OF BONE AND CARTILAGE: ICD-10-CM

## 2020-02-12 DIAGNOSIS — M89.9 DISORDER OF BONE AND CARTILAGE: ICD-10-CM

## 2020-02-12 PROCEDURE — 77080 DXA BONE DENSITY AXIAL: CPT | Mod: TC,HCNC

## 2020-02-12 PROCEDURE — 77080 DEXA BONE DENSITY SPINE HIP: ICD-10-PCS | Mod: 26,HCNC,, | Performed by: RADIOLOGY

## 2020-02-12 PROCEDURE — 77080 DXA BONE DENSITY AXIAL: CPT | Mod: 26,HCNC,, | Performed by: RADIOLOGY

## 2020-02-12 NOTE — TELEPHONE ENCOUNTER
I spoke with the patient and explained that her bone density test was normal. Patient verbalized understanding of above.

## 2020-02-18 RX ORDER — METFORMIN HYDROCHLORIDE 500 MG/1
TABLET, EXTENDED RELEASE ORAL
Qty: 360 TABLET | Refills: 0 | OUTPATIENT
Start: 2020-02-18

## 2020-02-18 NOTE — TELEPHONE ENCOUNTER
Advise patient - Refill declined as patient reported at last visit she would f/u with Dr. Elsy Dubose for diabetes in January. Please refer to Dr. Dubose for refills for diabetic meds.

## 2020-02-24 RX ORDER — METFORMIN HYDROCHLORIDE 500 MG/1
1000 TABLET, EXTENDED RELEASE ORAL 2 TIMES DAILY WITH MEALS
Qty: 360 TABLET | Refills: 0 | Status: SHIPPED | OUTPATIENT
Start: 2020-02-24 | End: 2020-03-23 | Stop reason: SDUPTHER

## 2020-03-18 DIAGNOSIS — I10 ESSENTIAL HYPERTENSION: ICD-10-CM

## 2020-03-19 RX ORDER — HYDROCHLOROTHIAZIDE 25 MG/1
TABLET ORAL
Qty: 90 TABLET | Refills: 1 | Status: SHIPPED | OUTPATIENT
Start: 2020-03-19 | End: 2020-08-07

## 2020-03-23 RX ORDER — METFORMIN HYDROCHLORIDE 500 MG/1
1000 TABLET, EXTENDED RELEASE ORAL 2 TIMES DAILY WITH MEALS
Qty: 360 TABLET | Refills: 0 | Status: SHIPPED | OUTPATIENT
Start: 2020-03-23 | End: 2020-05-18

## 2020-03-23 NOTE — TELEPHONE ENCOUNTER
----- Message from Luzma Carmen sent at 3/23/2020  9:23 AM CDT -----  Contact: MADELINE GARCIA [[id number]]      Can the clinic reply in [facility]: no    Please refill the medication(s) listed below. Please call the patient when the prescription(s) is ready for  at this phone number   [phone] (home)     Medication #1 metFORMIN (GLUCOPHAGE-XR) 500 MG XR 24hr tablet       Preferred Pharmacy:   [facility] - [place], [place] - [address]  [address] [address]  Phone: [phone] Fax: [phone]

## 2020-03-24 ENCOUNTER — TELEPHONE (OUTPATIENT)
Dept: FAMILY MEDICINE | Facility: CLINIC | Age: 65
End: 2020-03-24

## 2020-03-24 NOTE — TELEPHONE ENCOUNTER
----- Message from Real Lindsay sent at 3/24/2020  9:14 AM CDT -----  Contact: MADELINE GARCIA [[id number]]  Type:  Patient Returning Call    Who Called:     Who Left Message for Patient:     Does the patient know what this is regarding?: missed call/ medication    Best Call Back Number: [phone]    Additional Information:  N/a

## 2020-04-30 ENCOUNTER — LAB VISIT (OUTPATIENT)
Dept: LAB | Facility: HOSPITAL | Age: 65
End: 2020-04-30
Attending: INTERNAL MEDICINE
Payer: MEDICARE

## 2020-04-30 DIAGNOSIS — Z17.0 MALIGNANT NEOPLASM OF RIGHT BREAST IN FEMALE, ESTROGEN RECEPTOR POSITIVE, UNSPECIFIED SITE OF BREAST: ICD-10-CM

## 2020-04-30 DIAGNOSIS — D64.9 ANEMIA, UNSPECIFIED TYPE: ICD-10-CM

## 2020-04-30 DIAGNOSIS — C50.911 MALIGNANT NEOPLASM OF RIGHT BREAST IN FEMALE, ESTROGEN RECEPTOR POSITIVE, UNSPECIFIED SITE OF BREAST: ICD-10-CM

## 2020-04-30 LAB
ALBUMIN SERPL BCP-MCNC: 3.5 G/DL (ref 3.5–5.2)
ALP SERPL-CCNC: 68 U/L (ref 55–135)
ALT SERPL W/O P-5'-P-CCNC: 16 U/L (ref 10–44)
ANION GAP SERPL CALC-SCNC: 12 MMOL/L (ref 8–16)
AST SERPL-CCNC: 17 U/L (ref 10–40)
BASOPHILS # BLD AUTO: 0.04 K/UL (ref 0–0.2)
BASOPHILS NFR BLD: 0.4 % (ref 0–1.9)
BILIRUB SERPL-MCNC: 0.6 MG/DL (ref 0.1–1)
BUN SERPL-MCNC: 10 MG/DL (ref 8–23)
CALCIUM SERPL-MCNC: 9.7 MG/DL (ref 8.7–10.5)
CHLORIDE SERPL-SCNC: 106 MMOL/L (ref 95–110)
CO2 SERPL-SCNC: 27 MMOL/L (ref 23–29)
CREAT SERPL-MCNC: 0.8 MG/DL (ref 0.5–1.4)
DIFFERENTIAL METHOD: ABNORMAL
EOSINOPHIL # BLD AUTO: 0.1 K/UL (ref 0–0.5)
EOSINOPHIL NFR BLD: 1.1 % (ref 0–8)
ERYTHROCYTE [DISTWIDTH] IN BLOOD BY AUTOMATED COUNT: 14.4 % (ref 11.5–14.5)
EST. GFR  (AFRICAN AMERICAN): >60 ML/MIN/1.73 M^2
EST. GFR  (NON AFRICAN AMERICAN): >60 ML/MIN/1.73 M^2
GLUCOSE SERPL-MCNC: 159 MG/DL (ref 70–110)
HCT VFR BLD AUTO: 32.1 % (ref 37–48.5)
HGB BLD-MCNC: 10.5 G/DL (ref 12–16)
IMM GRANULOCYTES # BLD AUTO: 0.08 K/UL (ref 0–0.04)
IMM GRANULOCYTES NFR BLD AUTO: 0.7 % (ref 0–0.5)
LYMPHOCYTES # BLD AUTO: 3.7 K/UL (ref 1–4.8)
LYMPHOCYTES NFR BLD: 32.5 % (ref 18–48)
MCH RBC QN AUTO: 27.6 PG (ref 27–31)
MCHC RBC AUTO-ENTMCNC: 32.7 G/DL (ref 32–36)
MCV RBC AUTO: 85 FL (ref 82–98)
MONOCYTES # BLD AUTO: 0.5 K/UL (ref 0.3–1)
MONOCYTES NFR BLD: 4 % (ref 4–15)
NEUTROPHILS # BLD AUTO: 7 K/UL (ref 1.8–7.7)
NEUTROPHILS NFR BLD: 61.3 % (ref 38–73)
NRBC BLD-RTO: 0 /100 WBC
PLATELET # BLD AUTO: 251 K/UL (ref 150–350)
PMV BLD AUTO: 12 FL (ref 9.2–12.9)
POTASSIUM SERPL-SCNC: 3.8 MMOL/L (ref 3.5–5.1)
PROT SERPL-MCNC: 7.1 G/DL (ref 6–8.4)
RBC # BLD AUTO: 3.8 M/UL (ref 4–5.4)
SODIUM SERPL-SCNC: 145 MMOL/L (ref 136–145)
WBC # BLD AUTO: 11.35 K/UL (ref 3.9–12.7)

## 2020-04-30 PROCEDURE — 80053 COMPREHEN METABOLIC PANEL: CPT | Mod: HCNC

## 2020-04-30 PROCEDURE — 86300 IMMUNOASSAY TUMOR CA 15-3: CPT | Mod: HCNC

## 2020-04-30 PROCEDURE — 36415 COLL VENOUS BLD VENIPUNCTURE: CPT | Mod: HCNC,PO

## 2020-04-30 PROCEDURE — 85025 COMPLETE CBC W/AUTO DIFF WBC: CPT | Mod: HCNC

## 2020-04-30 PROCEDURE — 82378 CARCINOEMBRYONIC ANTIGEN: CPT | Mod: HCNC

## 2020-04-30 NOTE — PROGRESS NOTES
VIRTUAL AUDIO VISIT  Location : Home  Reason for followup : Breast Cancer.  Time spent 15 mins.       Chief Complaint :   Breast Cancer    Hx of Present illness :  Patient is a 65 y.o. year old female who presents to the clinic  for oncology followup. dx'd to have Carcinoma Right Breast  In . Lumpectomy and radiation therapy followed by adjuvant Arimidex therapy. Breast cancer Index did not show  Any benefit with extended harmonal therapy.  Completed arimidex therapy in 2018. Since last visit has had colonoscopic exam on 19 : documented to have a single sessile polyp in the ascending colon. polypectpomy done. Documented to have Grade 2 Internal hemmorrhoids.  Allergies :    Review of patient's allergies indicates:   Allergen Reactions    Ace inhibitors Other (See Comments)     cough    Penicillins Hives       Occupation : retired .    Transfusion :  None    Menstrual & obstetric Hx :  3; para 3.  Age of menarche: 12  Age of first pregnancy: 18  Lactation history: None  Age of menopause:  Hysterectomy in her late 40's for bleeding  HRT:  For many years.    Present Meds :   Medication List with Changes/Refills   Current Medications    CALCIUM CARBONATE-VITAMIN D3 (CALCIUM 600 + D,3,) 600 MG CALCIUM- 200 UNIT CAP    Take 1 tablet by mouth every morning.     GLIPIZIDE (GLUCOTROL) 10 MG TR24    TAKE 1 TABLET BY MOUTH TWICE DAILY    HYDROCHLOROTHIAZIDE (HYDRODIURIL) 25 MG TABLET    TAKE 1 TABLET EVERY DAY    LEVOTHYROXINE (SYNTHROID) 125 MCG TABLET    Take 125 mcg by mouth once daily.    LINAGLIPTIN (TRADJENTA) 5 MG TAB TABLET    Take 1 tablet (5 mg total) by mouth once daily.    LORATADINE (CLARITIN) 10 MG TABLET    Take 10 mg by mouth daily as needed.     LOSARTAN (COZAAR) 25 MG TABLET    TAKE 1 TABLET EVERY DAY    LOVASTATIN (MEVACOR) 20 MG TABLET    TAKE 1 TABLET EVERY DAY    METFORMIN (GLUCOPHAGE-XR) 500 MG XR 24HR TABLET    Take 2 tablets (1,000 mg total) by mouth 2 (two)  times daily with meals.       Past Medical Hx :  Carcinoma right breast. Diabetes Mellitus type 2; Hypertension; hyperlipidemia; Allergic rhinitis; osteopenia; sickle cell trait; Thyroid nodule. No Hx of hepatitis, TB, Sarcoid, Lupus, rheumatoid arthritis, seizure disorder, CVA, PE or DVT. No prior Hx of Chemotherapy    Past Medical Hx :  Past Medical History:   Diagnosis Date    AR (allergic rhinitis)     Breast cancer 06/2013    right; followed by oncology, Dr. Oliveira; s/p lumpectomy and  radiation    History of colonic polyps     Hyperlipidemia LDL goal < 100     Hypertension     Lower extremity edema     Mild chronic anemia     Obesity     Osteopenia     Sickle cell trait     Thyroid nodule     Type II or unspecified type diabetes mellitus without mention of complication, uncontrolled     Vitamin D deficiency disease        Travel Hx :  N/A    Immunization :  Immunization History   Administered Date(s) Administered    Influenza - Quadrivalent 10/08/2014    Influenza - Quadrivalent - PF (6 months and older) 09/28/2016, 12/06/2017, 12/12/2018, 02/04/2020    Influenza Split 11/26/2012, 01/10/2014    Pneumococcal Conjugate - 13 Valent 05/04/2016    Pneumococcal Polysaccharide - 23 Valent 08/13/2008, 12/06/2017    Tdap 07/24/2009       Family Hx : reviewed  Family History   Problem Relation Age of Onset    Diabetes Mother     Hypertension Mother     Hypertension Father     Prostate cancer Father     Diabetes Brother     Cancer Neg Hx     Breast cancer Neg Hx     Colon cancer Neg Hx     Ovarian cancer Neg Hx     Amblyopia Neg Hx     Blindness Neg Hx     Cataracts Neg Hx     Glaucoma Neg Hx     Macular degeneration Neg Hx     Retinal detachment Neg Hx     Strabismus Neg Hx     Stroke Neg Hx     Thyroid disease Neg Hx        Social Hx : Reviewed  Social History     Socioeconomic History    Marital status:      Spouse name: Not on file    Number of children: 3    Years of  education: Not on file    Highest education level: Not on file   Occupational History    Occupation: retired   Social Needs    Financial resource strain: Not on file    Food insecurity:     Worry: Not on file     Inability: Not on file    Transportation needs:     Medical: Not on file     Non-medical: Not on file   Tobacco Use    Smoking status: Never Smoker    Smokeless tobacco: Never Used   Substance and Sexual Activity    Alcohol use: No    Drug use: Not on file    Sexual activity: Not on file   Lifestyle    Physical activity:     Days per week: Not on file     Minutes per session: Not on file    Stress: Not on file   Relationships    Social connections:     Talks on phone: Not on file     Gets together: Not on file     Attends Muslim service: Not on file     Active member of club or organization: Not on file     Attends meetings of clubs or organizations: Not on file     Relationship status: Not on file   Other Topics Concern    Not on file   Social History Narrative    Not on file       Surgery :  Right Breast Bx; Lumpectomy Right; Tubal ligation; Complete Hysterectomy; Thyroidectomy.  Colonoscopy in 2014.  EGD in  Aug 2018  Colonoscopy 07/02/1`9    Symptoms :    Review of Systems   Constitutional: Negative for chills, diaphoresis, fever, malaise/fatigue and weight loss.        Good appetite.   HENT: Positive for congestion. Negative for hearing loss, nosebleeds, sore throat and tinnitus.    Eyes: Negative for blurred vision, double vision and photophobia.        Wears Glasses   Respiratory: Negative for cough, hemoptysis, sputum production and shortness of breath.    Cardiovascular: Positive for leg swelling (Sometimes). Negative for chest pain, palpitations, orthopnea and claudication.   Gastrointestinal: Positive for diarrhea (Mild). Negative for abdominal pain, blood in stool, constipation, heartburn, nausea and vomiting.        Diarrhoea  Related to Metgormin   Genitourinary: Negative.   Negative for dysuria, flank pain, frequency, hematuria and urgency.   Musculoskeletal: Positive for joint pain. Negative for back pain, falls, myalgias and neck pain.        Mild   Skin: Negative for itching and rash.   Neurological: Negative for dizziness, tingling, tremors, sensory change and headaches.   Endo/Heme/Allergies: Negative for polydipsia. Does not bruise/bleed easily.   Psychiatric/Behavioral: Negative for depression and memory loss. The patient is nervous/anxious (Sometimes) and has insomnia.      Physical Exam :  N/A    Labs & Imaging :  04/30/2020 : NFBS 159; Cr.0.8; eGFR > 60; Ca 9.7; bili 0.6; Liver enzymes Normal.  CEA and CA 27.29 pending.  Hgb 10.5; Hct 32.1; MCV 85; Platelets 251,000. ANC 7,000.       12/16/19 : Hgb 10.9; Hct 32.3; MCV 85; Platelets 276,000 ANC 7,700. Serum iron 49; TIBC 311; sat 16 %; NFBS 128; Cr.0.8; ca 10.2; bili 0.6  Liver enzymes normal. eFR . 60.     Dx :  Carcinoma Right breast in remission. Chronic anemia.         Assessment & Plan:  Reviewed with patient. . OTC Iron One daily.   Oncology followup in 4.  months with CBC,CMP,CEA.  Patient understands and verbalised.   Advance Care Planning     Power of   I initiated the process of advance care planning today and explained the importance of this process to the patient.  I introduced the concept of advance directives to the patient, as well. Then the patient received detailed information about the importance of designating a Health Care Power of  (HCPOA). She was also instructed to communicate with this person about their wishes for future healthcare, should she become sick and lose decision-making capacity. The patient has not  previously appointed a HCPOA.          Living Will  During this visit, I engaged the patient in the advance care planning process.  The patient and I reviewed the role for advance directives and their purpose in directing future healthcare if the patient's unable to speak for  him/herself.  At this point in time, the patient does have full decision-making capacity.  We discussed different extreme health states that she could experience, and reviewed what kind of medical care she would want in those situations.  The patient communicated that if she were comatose and had little chance of a meaningful recovery, she  want machines/life-sustaining treatments used.  In addition to the above preference, other important end-of-life issues for the patient .  The patient  HAS NOT completed a living will to reflect these preferences.  The patient   HAS NOT  already designated a healthcare power of  to make decisions on her behalf.       Brochures given

## 2020-05-01 ENCOUNTER — OFFICE VISIT (OUTPATIENT)
Dept: HEMATOLOGY/ONCOLOGY | Facility: CLINIC | Age: 65
End: 2020-05-01
Payer: MEDICARE

## 2020-05-01 DIAGNOSIS — C50.911 MALIGNANT NEOPLASM OF RIGHT BREAST IN FEMALE, ESTROGEN RECEPTOR POSITIVE, UNSPECIFIED SITE OF BREAST: Primary | ICD-10-CM

## 2020-05-01 DIAGNOSIS — D64.9 ANEMIA, UNSPECIFIED TYPE: ICD-10-CM

## 2020-05-01 DIAGNOSIS — Z17.0 MALIGNANT NEOPLASM OF RIGHT BREAST IN FEMALE, ESTROGEN RECEPTOR POSITIVE, UNSPECIFIED SITE OF BREAST: Primary | ICD-10-CM

## 2020-05-01 LAB
CANCER AG27-29 SERPL-ACNC: 21.3 U/ML
CEA SERPL-MCNC: <1 NG/ML (ref 0–5)

## 2020-05-01 PROCEDURE — 1101F PR PT FALLS ASSESS DOC 0-1 FALLS W/OUT INJ PAST YR: ICD-10-PCS | Mod: HCNC,CPTII,95, | Performed by: INTERNAL MEDICINE

## 2020-05-01 PROCEDURE — 99442 PR PHYSICIAN TELEPHONE EVALUATION 11-20 MIN: ICD-10-PCS | Mod: HCNC,95,, | Performed by: INTERNAL MEDICINE

## 2020-05-01 PROCEDURE — 1101F PT FALLS ASSESS-DOCD LE1/YR: CPT | Mod: HCNC,CPTII,95, | Performed by: INTERNAL MEDICINE

## 2020-05-01 PROCEDURE — 99442 PR PHYSICIAN TELEPHONE EVALUATION 11-20 MIN: CPT | Mod: HCNC,95,, | Performed by: INTERNAL MEDICINE

## 2020-05-08 RX ORDER — GLIPIZIDE 10 MG/1
TABLET, FILM COATED, EXTENDED RELEASE ORAL
Qty: 180 TABLET | Refills: 0 | Status: SHIPPED | OUTPATIENT
Start: 2020-05-08 | End: 2020-08-07

## 2020-05-18 RX ORDER — METFORMIN HYDROCHLORIDE 500 MG/1
1000 TABLET, EXTENDED RELEASE ORAL 2 TIMES DAILY WITH MEALS
Qty: 360 TABLET | Refills: 0 | Status: SHIPPED | OUTPATIENT
Start: 2020-05-18 | End: 2020-07-22

## 2020-05-18 NOTE — TELEPHONE ENCOUNTER
Care Due:                  Date            Visit Type   Department     Provider  --------------------------------------------------------------------------------    Last Visit: None Found      None         None Found  Next Visit: None Scheduled  None         None Found                                                            Last  Test          Frequency    Reason                     Performed    Due Date  --------------------------------------------------------------------------------    Office Visit  12 months..  linaGLIPtin, metFORMIN...  Not Found    Overdue    eGFR........  12 months..  metFORMIN................  Not Found    Overdue    HBA1C.......  6 months...  linaGLIPtin, metFORMIN...  12- 06-    Powered by [employer]. Reference number: [id number]. 5/18/2020 2:15:04 PM CDT

## 2020-05-21 DIAGNOSIS — E78.5 HYPERLIPIDEMIA WITH TARGET LDL LESS THAN 100: ICD-10-CM

## 2020-05-21 RX ORDER — LOVASTATIN 20 MG/1
TABLET ORAL
Qty: 90 TABLET | Refills: 0 | Status: SHIPPED | OUTPATIENT
Start: 2020-05-21 | End: 2020-07-21

## 2020-06-16 ENCOUNTER — LAB VISIT (OUTPATIENT)
Dept: LAB | Facility: HOSPITAL | Age: 65
End: 2020-06-16
Attending: INTERNAL MEDICINE
Payer: MEDICARE

## 2020-06-16 DIAGNOSIS — M89.9 DISORDER OF BONE AND CARTILAGE: ICD-10-CM

## 2020-06-16 DIAGNOSIS — D64.9 ANEMIA, UNSPECIFIED TYPE: ICD-10-CM

## 2020-06-16 DIAGNOSIS — E78.5 HYPERLIPIDEMIA WITH TARGET LDL LESS THAN 100: ICD-10-CM

## 2020-06-16 DIAGNOSIS — E89.0 HYPOTHYROIDISM, POSTSURGICAL: ICD-10-CM

## 2020-06-16 DIAGNOSIS — M94.9 DISORDER OF BONE AND CARTILAGE: ICD-10-CM

## 2020-06-16 LAB
ALBUMIN SERPL BCP-MCNC: 3.6 G/DL (ref 3.5–5.2)
ALP SERPL-CCNC: 74 U/L (ref 55–135)
ALT SERPL W/O P-5'-P-CCNC: 16 U/L (ref 10–44)
ANION GAP SERPL CALC-SCNC: 13 MMOL/L (ref 8–16)
AST SERPL-CCNC: 18 U/L (ref 10–40)
BASOPHILS # BLD AUTO: 0.06 K/UL (ref 0–0.2)
BASOPHILS NFR BLD: 0.4 % (ref 0–1.9)
BILIRUB SERPL-MCNC: 0.5 MG/DL (ref 0.1–1)
BUN SERPL-MCNC: 11 MG/DL (ref 8–23)
CALCIUM SERPL-MCNC: 9.8 MG/DL (ref 8.7–10.5)
CHLORIDE SERPL-SCNC: 105 MMOL/L (ref 95–110)
CHOLEST SERPL-MCNC: 143 MG/DL (ref 120–199)
CHOLEST/HDLC SERPL: 2.6 {RATIO} (ref 2–5)
CO2 SERPL-SCNC: 26 MMOL/L (ref 23–29)
CREAT SERPL-MCNC: 0.9 MG/DL (ref 0.5–1.4)
DIFFERENTIAL METHOD: ABNORMAL
EOSINOPHIL # BLD AUTO: 0.1 K/UL (ref 0–0.5)
EOSINOPHIL NFR BLD: 1 % (ref 0–8)
ERYTHROCYTE [DISTWIDTH] IN BLOOD BY AUTOMATED COUNT: 14 % (ref 11.5–14.5)
EST. GFR  (AFRICAN AMERICAN): >60 ML/MIN/1.73 M^2
EST. GFR  (NON AFRICAN AMERICAN): >60 ML/MIN/1.73 M^2
ESTIMATED AVG GLUCOSE: 189 MG/DL (ref 68–131)
GLUCOSE SERPL-MCNC: 138 MG/DL (ref 70–110)
HBA1C MFR BLD HPLC: 8.2 % (ref 4–5.6)
HCT VFR BLD AUTO: 34.2 % (ref 37–48.5)
HDLC SERPL-MCNC: 56 MG/DL (ref 40–75)
HDLC SERPL: 39.2 % (ref 20–50)
HGB BLD-MCNC: 11.2 G/DL (ref 12–16)
IMM GRANULOCYTES # BLD AUTO: 0.07 K/UL (ref 0–0.04)
IMM GRANULOCYTES NFR BLD AUTO: 0.5 % (ref 0–0.5)
LDLC SERPL CALC-MCNC: 63.2 MG/DL (ref 63–159)
LYMPHOCYTES # BLD AUTO: 4.9 K/UL (ref 1–4.8)
LYMPHOCYTES NFR BLD: 34.3 % (ref 18–48)
MCH RBC QN AUTO: 28 PG (ref 27–31)
MCHC RBC AUTO-ENTMCNC: 32.7 G/DL (ref 32–36)
MCV RBC AUTO: 86 FL (ref 82–98)
MONOCYTES # BLD AUTO: 0.5 K/UL (ref 0.3–1)
MONOCYTES NFR BLD: 3.6 % (ref 4–15)
NEUTROPHILS # BLD AUTO: 8.5 K/UL (ref 1.8–7.7)
NEUTROPHILS NFR BLD: 60.2 % (ref 38–73)
NONHDLC SERPL-MCNC: 87 MG/DL
NRBC BLD-RTO: 0 /100 WBC
PLATELET # BLD AUTO: 208 K/UL (ref 150–350)
PMV BLD AUTO: 13 FL (ref 9.2–12.9)
POTASSIUM SERPL-SCNC: 3.6 MMOL/L (ref 3.5–5.1)
PROT SERPL-MCNC: 7.4 G/DL (ref 6–8.4)
RBC # BLD AUTO: 4 M/UL (ref 4–5.4)
SODIUM SERPL-SCNC: 144 MMOL/L (ref 136–145)
T4 FREE SERPL-MCNC: 0.96 NG/DL (ref 0.71–1.51)
TRIGL SERPL-MCNC: 119 MG/DL (ref 30–150)
TSH SERPL DL<=0.005 MIU/L-ACNC: 7.38 UIU/ML (ref 0.4–4)
WBC # BLD AUTO: 14.18 K/UL (ref 3.9–12.7)

## 2020-06-16 PROCEDURE — 80053 COMPREHEN METABOLIC PANEL: CPT | Mod: HCNC

## 2020-06-16 PROCEDURE — 83036 HEMOGLOBIN GLYCOSYLATED A1C: CPT | Mod: HCNC

## 2020-06-16 PROCEDURE — 84443 ASSAY THYROID STIM HORMONE: CPT | Mod: HCNC

## 2020-06-16 PROCEDURE — 85025 COMPLETE CBC W/AUTO DIFF WBC: CPT | Mod: HCNC

## 2020-06-16 PROCEDURE — 84439 ASSAY OF FREE THYROXINE: CPT | Mod: HCNC

## 2020-06-16 PROCEDURE — 36415 COLL VENOUS BLD VENIPUNCTURE: CPT | Mod: HCNC,PO

## 2020-06-16 PROCEDURE — 80061 LIPID PANEL: CPT | Mod: HCNC

## 2020-06-16 PROCEDURE — 82306 VITAMIN D 25 HYDROXY: CPT | Mod: HCNC

## 2020-06-17 LAB — 25(OH)D3+25(OH)D2 SERPL-MCNC: 25 NG/ML (ref 30–96)

## 2020-07-01 ENCOUNTER — OFFICE VISIT (OUTPATIENT)
Dept: FAMILY MEDICINE | Facility: CLINIC | Age: 65
End: 2020-07-01
Payer: MEDICARE

## 2020-07-01 VITALS
DIASTOLIC BLOOD PRESSURE: 66 MMHG | TEMPERATURE: 98 F | SYSTOLIC BLOOD PRESSURE: 114 MMHG | WEIGHT: 231.38 LBS | HEIGHT: 66 IN | OXYGEN SATURATION: 96 % | HEART RATE: 74 BPM | BODY MASS INDEX: 37.18 KG/M2

## 2020-07-01 DIAGNOSIS — D57.3 SICKLE CELL TRAIT: ICD-10-CM

## 2020-07-01 DIAGNOSIS — I10 ESSENTIAL HYPERTENSION: ICD-10-CM

## 2020-07-01 DIAGNOSIS — E78.5 HYPERLIPIDEMIA WITH TARGET LDL LESS THAN 100: ICD-10-CM

## 2020-07-01 DIAGNOSIS — Z85.3 HX: BREAST CANCER: ICD-10-CM

## 2020-07-01 DIAGNOSIS — E55.9 VITAMIN D DEFICIENCY DISEASE: ICD-10-CM

## 2020-07-01 DIAGNOSIS — E66.01 SEVERE OBESITY (BMI 35.0-35.9 WITH COMORBIDITY): ICD-10-CM

## 2020-07-01 DIAGNOSIS — E89.0 HYPOTHYROIDISM, POSTSURGICAL: ICD-10-CM

## 2020-07-01 PROCEDURE — 1101F PT FALLS ASSESS-DOCD LE1/YR: CPT | Mod: HCNC,CPTII,S$GLB, | Performed by: NURSE PRACTITIONER

## 2020-07-01 PROCEDURE — 3052F HG A1C>EQUAL 8.0%<EQUAL 9.0%: CPT | Mod: HCNC,CPTII,S$GLB, | Performed by: NURSE PRACTITIONER

## 2020-07-01 PROCEDURE — 3078F DIAST BP <80 MM HG: CPT | Mod: HCNC,CPTII,S$GLB, | Performed by: NURSE PRACTITIONER

## 2020-07-01 PROCEDURE — 3052F PR MOST RECENT HEMOGLOBIN A1C LEVEL 8.0 - < 9.0%: ICD-10-PCS | Mod: HCNC,CPTII,S$GLB, | Performed by: NURSE PRACTITIONER

## 2020-07-01 PROCEDURE — 99999 PR PBB SHADOW E&M-EST. PATIENT-LVL IV: CPT | Mod: PBBFAC,HCNC,, | Performed by: NURSE PRACTITIONER

## 2020-07-01 PROCEDURE — 1101F PR PT FALLS ASSESS DOC 0-1 FALLS W/OUT INJ PAST YR: ICD-10-PCS | Mod: HCNC,CPTII,S$GLB, | Performed by: NURSE PRACTITIONER

## 2020-07-01 PROCEDURE — 3008F PR BODY MASS INDEX (BMI) DOCUMENTED: ICD-10-PCS | Mod: HCNC,CPTII,S$GLB, | Performed by: NURSE PRACTITIONER

## 2020-07-01 PROCEDURE — 3078F PR MOST RECENT DIASTOLIC BLOOD PRESSURE < 80 MM HG: ICD-10-PCS | Mod: HCNC,CPTII,S$GLB, | Performed by: NURSE PRACTITIONER

## 2020-07-01 PROCEDURE — 3008F BODY MASS INDEX DOCD: CPT | Mod: HCNC,CPTII,S$GLB, | Performed by: NURSE PRACTITIONER

## 2020-07-01 PROCEDURE — 99214 OFFICE O/P EST MOD 30 MIN: CPT | Mod: HCNC,S$GLB,, | Performed by: NURSE PRACTITIONER

## 2020-07-01 PROCEDURE — 99214 PR OFFICE/OUTPT VISIT, EST, LEVL IV, 30-39 MIN: ICD-10-PCS | Mod: HCNC,S$GLB,, | Performed by: NURSE PRACTITIONER

## 2020-07-01 PROCEDURE — 99499 UNLISTED E&M SERVICE: CPT | Mod: HCNC,S$GLB,, | Performed by: NURSE PRACTITIONER

## 2020-07-01 PROCEDURE — 99999 PR PBB SHADOW E&M-EST. PATIENT-LVL IV: ICD-10-PCS | Mod: PBBFAC,HCNC,, | Performed by: NURSE PRACTITIONER

## 2020-07-01 PROCEDURE — 3074F PR MOST RECENT SYSTOLIC BLOOD PRESSURE < 130 MM HG: ICD-10-PCS | Mod: HCNC,CPTII,S$GLB, | Performed by: NURSE PRACTITIONER

## 2020-07-01 PROCEDURE — 3074F SYST BP LT 130 MM HG: CPT | Mod: HCNC,CPTII,S$GLB, | Performed by: NURSE PRACTITIONER

## 2020-07-01 PROCEDURE — 99499 RISK ADDL DX/OHS AUDIT: ICD-10-PCS | Mod: HCNC,S$GLB,, | Performed by: NURSE PRACTITIONER

## 2020-07-01 RX ORDER — LEVOTHYROXINE SODIUM 150 UG/1
150 TABLET ORAL
Qty: 90 TABLET | Refills: 3 | Status: SHIPPED | OUTPATIENT
Start: 2020-07-01 | End: 2021-06-25

## 2020-07-01 NOTE — PATIENT INSTRUCTIONS
Mammogram scheduled tomorrow   Pap smear in 3 months   Check HgB A1c, TSH and Free T4 in 3 months with one week follow up with me  Increase Synthyroid to .150 mg daily on a empty stomach  Take Vit D 2,000 IU daily  Watch diet very closely  Stop drinking regular cokes   Appt with Dr. Bailey in 6 months

## 2020-07-01 NOTE — PROGRESS NOTES
Subjective:       Patient ID: Masha Garcia is a 65 y.o. female.    Chief Complaint: Annual Exam    65-year-old female presents to the clinic today for annual physical exam.  She has poor dietary habits.  She admits to drinking 2 regular Cokes every day.  She does a walking tape 30 minutes 3 times a week.  She is compliant with all of her medications.  She says she is not taking her vitamin-D regularly like she is suppose too.  She is currently taking Synthroid 0.125 mg on an empty stomach.  She denies any headaches, dizziness, or blurred vision.  She denies any cardiac chest pain, heart palpitations, shortness breath, or swelling to lower extremities. I explained to her that her hemoglobin A1c was 8.3 which meant her diabetes was poorly controlled.  I also explained that she did get her hemoglobin a.m. A1c under control would have to start an injection weekly.    Past Medical History:   Diagnosis Date    AR (allergic rhinitis)     Breast cancer 06/2013    right; followed by oncology, Dr. Oliveira; s/p lumpectomy and  radiation    History of colonic polyps     Hyperlipidemia LDL goal < 100     Hypertension     Lower extremity edema     Mild chronic anemia     Obesity     Osteopenia     Sickle cell trait     Thyroid nodule     Type II or unspecified type diabetes mellitus without mention of complication, uncontrolled     Vitamin D deficiency disease      Past Surgical History:   Procedure Laterality Date    benign right breast biopsy  2012    lumpectomy right breast  6/2013    right ankle fracture repair      TOTAL ABDOMINAL HYSTERECTOMY      TOTAL THYROIDECTOMY  12/28/2016      reports that she has never smoked. She has never used smokeless tobacco. She reports that she does not drink alcohol.  Review of Systems   Constitutional: Negative for activity change.   Respiratory: Negative for cough, chest tightness, shortness of breath and wheezing.    Cardiovascular: Negative for chest pain, palpitations  and leg swelling.   Gastrointestinal: Negative for abdominal pain, constipation, diarrhea, nausea and vomiting.   Musculoskeletal: Negative for gait problem.   Skin: Negative for color change.   Neurological: Negative for dizziness, syncope and light-headedness.       Objective:      Physical Exam  Constitutional:       General: She is not in acute distress.     Appearance: She is well-developed. She is not diaphoretic.   Eyes:      General: No scleral icterus.        Right eye: No discharge.         Left eye: No discharge.      Conjunctiva/sclera: Conjunctivae normal.      Pupils: Pupils are equal, round, and reactive to light.   Neck:      Musculoskeletal: Normal range of motion and neck supple.      Vascular: No JVD.   Cardiovascular:      Rate and Rhythm: Normal rate and regular rhythm.      Heart sounds: Normal heart sounds. No murmur.   Pulmonary:      Effort: Pulmonary effort is normal. No respiratory distress.      Breath sounds: Normal breath sounds. No wheezing or rales.   Abdominal:      General: Bowel sounds are normal.      Palpations: Abdomen is soft.      Tenderness: There is no abdominal tenderness.   Musculoskeletal: Normal range of motion.   Skin:     General: Skin is warm and dry.   Neurological:      Mental Status: She is alert and oriented to person, place, and time.         Assessment:       1. Uncontrolled type 2 diabetes mellitus without complication    2. Hyperlipidemia with target LDL less than 100    3. Essential hypertension    4. Vitamin D deficiency disease    5. Hypothyroidism, postsurgical    6. Sickle cell trait    7. Severe obesity (BMI 35.0-35.9 with comorbidity)    8. HX: breast cancer        Plan:         Uncontrolled type 2 diabetes mellitus without complication  -     Hemoglobin A1C; Future; Expected date: 07/01/2020  - continue current medications  - stop drinking regular cokes   - watch diet closely    Hyperlipidemia with target LDL less than 100  - The current medical  regimen is effective;  continue present plan and medications.    Essential hypertension  - The current medical regimen is effective;  continue present plan and medications.    Vitamin D deficiency disease  - take Vit D 2,000 IU daily    Hypothyroidism, postsurgical  -     levothyroxine (SYNTHROID) 150 MCG tablet; Take 1 tablet (150 mcg total) by mouth before breakfast.  Dispense: 90 tablet; Refill: 3  -     TSH; Future; Expected date: 07/01/2020  -     T4, free; Future; Expected date: 07/01/2020  - increase Synthroid .150 mg daily on a empty stomach    Sickle cell trait  - stable, observe, asymptomatic    Severe obesity (BMI 35.0-35.9 with comorbidity)  - The patient is asked to make an attempt to improve diet and exercise patterns to aid in medical management of this problem.    HX: breast cancer  -completed 5 years of tamoxifen

## 2020-07-02 LAB — BCS RECOMMENDATION EXT: NORMAL

## 2020-07-22 RX ORDER — METFORMIN HYDROCHLORIDE 500 MG/1
TABLET, EXTENDED RELEASE ORAL
Qty: 360 TABLET | Refills: 1 | Status: SHIPPED | OUTPATIENT
Start: 2020-07-22 | End: 2021-01-13

## 2020-08-07 DIAGNOSIS — I10 ESSENTIAL HYPERTENSION: ICD-10-CM

## 2020-08-07 RX ORDER — HYDROCHLOROTHIAZIDE 25 MG/1
TABLET ORAL
Qty: 90 TABLET | Refills: 1 | Status: SHIPPED | OUTPATIENT
Start: 2020-08-07 | End: 2020-11-05

## 2020-08-12 RX ORDER — LINAGLIPTIN 5 MG/1
5 TABLET, FILM COATED ORAL DAILY
Qty: 90 TABLET | Refills: 1 | Status: SHIPPED | OUTPATIENT
Start: 2020-08-12 | End: 2021-02-22 | Stop reason: SDUPTHER

## 2020-08-27 NOTE — PROGRESS NOTES
Chief Complaint :   Breast Cancer    Hx of Present illness :  Patient is a 65 y.o. year old female who presents to the clinic  for oncology followup. dx'd to have Carcinoma Right Breast  In . Lumpectomy and radiation therapy followed by adjuvant Arimidex therapy. Breast cancer Index did not show  Any benefit with extended harmonal therapy.  Completed arimidex therapy in 2018. Since last visit has had colonoscopic exam on 19 : documented to have a single sessile polyp in the ascending colon. polypectpomy done. Documented to have Grade 2 Internal hemmorrhoids.  Allergies :    Review of patient's allergies indicates:   Allergen Reactions    Ace inhibitors Other (See Comments)     cough    Penicillins Hives       Occupation : retired .    Transfusion :  None    Menstrual & obstetric Hx :  3; para 3.  Age of menarche: 12  Age of first pregnancy: 18  Lactation history: None  Age of menopause:  Hysterectomy in her late 40's for bleeding  HRT:  For many years.    Present Meds :   Medication List with Changes/Refills   Current Medications    CALCIUM CARBONATE-VITAMIN D3 (CALCIUM 600 + D,3,) 600 MG CALCIUM- 200 UNIT CAP    Take 1 tablet by mouth every morning.     GLIPIZIDE (GLUCOTROL) 10 MG TR24    Take 1 tablet by mouth twice daily    HYDROCHLOROTHIAZIDE (HYDRODIURIL) 25 MG TABLET    TAKE 1 TABLET EVERY DAY    LEVOTHYROXINE (SYNTHROID) 125 MCG TABLET    Take 125 mcg by mouth once daily.    LEVOTHYROXINE (SYNTHROID) 150 MCG TABLET    Take 1 tablet (150 mcg total) by mouth before breakfast.    LINAGLIPTIN (TRADJENTA) 5 MG TAB TABLET    Take 1 tablet (5 mg total) by mouth once daily.    LORATADINE (CLARITIN) 10 MG TABLET    Take 10 mg by mouth daily as needed.     LOSARTAN (COZAAR) 25 MG TABLET    TAKE 1 TABLET EVERY DAY    LOVASTATIN (MEVACOR) 20 MG TABLET    TAKE 1 TABLET EVERY DAY    METFORMIN (GLUCOPHAGE-XR) 500 MG XR 24HR TABLET    TAKE 2 TABLETS TWICE DAILY WITH MEALS       Past  Medical Hx :  Carcinoma right breast. Diabetes Mellitus type 2; Hypertension; hyperlipidemia; Allergic rhinitis; osteopenia; sickle cell trait; Thyroid nodule. No Hx of hepatitis, TB, Sarcoid, Lupus, rheumatoid arthritis, seizure disorder, CVA, PE or DVT. No prior Hx of Chemotherapy    Past Medical Hx :  Past Medical History:   Diagnosis Date    AR (allergic rhinitis)     Breast cancer 06/2013    right; followed by oncology, Dr. Oliveira; s/p lumpectomy and  radiation    History of colonic polyps     Hyperlipidemia LDL goal < 100     Hypertension     Lower extremity edema     Mild chronic anemia     Obesity     Osteopenia     Sickle cell trait     Thyroid nodule     Type II or unspecified type diabetes mellitus without mention of complication, uncontrolled     Vitamin D deficiency disease        Travel Hx :  N/A    Immunization :  Immunization History   Administered Date(s) Administered    Influenza - Quadrivalent 10/08/2014    Influenza - Quadrivalent - PF *Preferred* (6 months and older) 09/28/2016, 12/06/2017, 12/12/2018, 02/04/2020    Influenza Split 11/26/2012, 01/10/2014    Pneumococcal Conjugate - 13 Valent 05/04/2016    Pneumococcal Polysaccharide - 23 Valent 08/13/2008, 12/06/2017    Tdap 07/24/2009       Family Hx : reviewed  Family History   Problem Relation Age of Onset    Diabetes Mother     Hypertension Mother     Hypertension Father     Prostate cancer Father     Diabetes Brother     Cancer Neg Hx     Breast cancer Neg Hx     Colon cancer Neg Hx     Ovarian cancer Neg Hx     Amblyopia Neg Hx     Blindness Neg Hx     Cataracts Neg Hx     Glaucoma Neg Hx     Macular degeneration Neg Hx     Retinal detachment Neg Hx     Strabismus Neg Hx     Stroke Neg Hx     Thyroid disease Neg Hx        Social Hx : Reviewed  Social History     Socioeconomic History    Marital status:      Spouse name: Not on file    Number of children: 3    Years of education: Not on file     Highest education level: Not on file   Occupational History    Occupation: retired   Social Needs    Financial resource strain: Not on file    Food insecurity     Worry: Not on file     Inability: Not on file    Transportation needs     Medical: Not on file     Non-medical: Not on file   Tobacco Use    Smoking status: Never Smoker    Smokeless tobacco: Never Used   Substance and Sexual Activity    Alcohol use: No    Drug use: Not on file    Sexual activity: Not on file   Lifestyle    Physical activity     Days per week: Not on file     Minutes per session: Not on file    Stress: Not on file   Relationships    Social connections     Talks on phone: Not on file     Gets together: Not on file     Attends Jew service: Not on file     Active member of club or organization: Not on file     Attends meetings of clubs or organizations: Not on file     Relationship status: Not on file   Other Topics Concern    Not on file   Social History Narrative    Not on file       Surgery :  Right Breast Bx; Lumpectomy Right; Tubal ligation; Complete Hysterectomy; Thyroidectomy.  Colonoscopy in 2014.  EGD in  Aug 2018  Colonoscopy 07/02/1`9    Symptoms :   Appetite normal; normal energy.   Review of Systems   Constitutional: Negative for chills, diaphoresis, fever, malaise/fatigue and weight loss.   HENT: Positive for congestion. Negative for hearing loss, nosebleeds, sore throat and tinnitus.    Eyes: Negative for blurred vision, double vision and photophobia.        Wears Glasses   Respiratory: Negative for cough, hemoptysis, sputum production and shortness of breath.    Cardiovascular: Negative.  Negative for chest pain, palpitations, orthopnea, claudication, leg swelling and PND.   Gastrointestinal: Positive for diarrhea (Mild). Negative for abdominal pain, blood in stool, constipation, heartburn, nausea and vomiting.        Diarrhoea  Related to Metgormin not as bad.   Genitourinary: Negative.  Negative for  dysuria, flank pain, frequency, hematuria and urgency.   Musculoskeletal: Positive for joint pain. Negative for back pain, falls, myalgias and neck pain.        Mild   Skin: Negative for itching and rash.   Neurological: Negative for dizziness, tingling, tremors, sensory change and headaches.   Endo/Heme/Allergies: Negative for polydipsia. Does not bruise/bleed easily.   Psychiatric/Behavioral: Negative for depression and memory loss. The patient is nervous/anxious (Sometimes) and has insomnia.        Physical Exam :   Medical assistant  Yani  present in the room  Physical Exam   Constitutional: She is oriented to person, place, and time and well-developed, well-nourished, and in no distress. No distress.   HENT:   Head: Normocephalic and atraumatic.   Right Ear: External ear normal.   Left Ear: External ear normal.   Nose: Nose normal.   Mouth/Throat: Oropharynx is clear and moist. No oropharyngeal exudate.   Eyes: Pupils are equal, round, and reactive to light. Conjunctivae and EOM are normal. Right eye exhibits no discharge. Left eye exhibits no discharge. No scleral icterus.   Neck: Normal range of motion and full passive range of motion without pain. Neck supple.       Cardiovascular: Normal rate, regular rhythm, S1 normal, normal heart sounds, intact distal pulses and normal pulses. PMI is not displaced.   Pulmonary/Chest: Breath sounds normal. No apnea. Right breast exhibits no inverted nipple, no mass, no nipple discharge, no skin change and no tenderness. Left breast exhibits no inverted nipple, no mass, no nipple discharge, no skin change and no tenderness.   Abdominal: Soft. Normal appearance, normal aorta and bowel sounds are normal. She exhibits no distension, no ascites and no mass. There is no hepatosplenomegaly, splenomegaly or hepatomegaly. There is no abdominal tenderness. There is no rebound and no CVA tenderness. No hernia.   Genitourinary:    Genitourinary Comments: Not Examined      Musculoskeletal: Normal range of motion.   Lymphadenopathy:        Head (right side): No submental, no submandibular, no tonsillar, no preauricular, no posterior auricular and no occipital adenopathy present.        Head (left side): No submental, no submandibular, no tonsillar, no preauricular, no posterior auricular and no occipital adenopathy present.     She has no cervical adenopathy.     She has no axillary adenopathy.        Right: No supraclavicular and no epitrochlear adenopathy present.        Left: No supraclavicular and no epitrochlear adenopathy present.   Neurological: She is alert and oriented to person, place, and time. She has normal motor skills, normal sensation, normal strength, normal reflexes and intact cranial nerves. Gait normal. GCS score is 15.   Skin: Skin is warm, dry and intact. No rash noted. She is not diaphoretic. No cyanosis. No pallor. Nails show no clubbing.   Psychiatric: Mood, memory, affect and judgment normal.       Labs & Imaging :  08/28/2020 : Hgb 10.8; hct 33; MCV 85; Platelets 252,000 ANC 7,700. ;      NFBS 167; Cr.0.9; Ca 10.2; bili 0.6; Liver enzymes normal.       eGFR > 60.  CEA < 1.0. CA 27.29 antigen normal.        12/16/19 : Hgb 10.9; Hct 32.3; MCV 85; Platelets 276,000 ANC 7,700. Serum iron 49; TIBC 311; sat 16 %; NFBS 128; Cr.0.8; ca 10.2; bili 0.6  Liver enzymes normal. eFR . 60.     Dx :  Carcinoma Right breast in remission. Chronic anemia.         Assessment & Plan:  Reviewed with patient. . OTC Iron One daily.   Oncology followup in 4.  months with CBC,CMP,CEA.  Patient understands and verbalised.   Advance Care Planning     Power of   I initiated the process of advance care planning today and explained the importance of this process to the patient.  I introduced the concept of advance directives to the patient, as well. Then the patient received detailed information about the importance of designating a Health Care Power of  (HCPOA). She  was also instructed to communicate with this person about their wishes for future healthcare, should she become sick and lose decision-making capacity. The patient has not  previously appointed a HCPOA.          Living Will  During this visit, I engaged the patient in the advance care planning process.  The patient and I reviewed the role for advance directives and their purpose in directing future healthcare if the patient's unable to speak for him/herself.  At this point in time, the patient does have full decision-making capacity.  We discussed different extreme health states that she could experience, and reviewed what kind of medical care she would want in those situations.  The patient communicated that if she were comatose and had little chance of a meaningful recovery, she  want machines/life-sustaining treatments used.  In addition to the above preference, other important end-of-life issues for the patient .  The patient  HAS NOT completed a living will to reflect these preferences.  The patient   HAS NOT  already designated a healthcare power of  to make decisions on her behalf.       Brochures given

## 2020-08-28 ENCOUNTER — LAB VISIT (OUTPATIENT)
Dept: LAB | Facility: HOSPITAL | Age: 65
End: 2020-08-28
Attending: INTERNAL MEDICINE
Payer: MEDICARE

## 2020-08-28 DIAGNOSIS — C50.911 MALIGNANT NEOPLASM OF RIGHT BREAST IN FEMALE, ESTROGEN RECEPTOR POSITIVE, UNSPECIFIED SITE OF BREAST: ICD-10-CM

## 2020-08-28 DIAGNOSIS — Z17.0 MALIGNANT NEOPLASM OF RIGHT BREAST IN FEMALE, ESTROGEN RECEPTOR POSITIVE, UNSPECIFIED SITE OF BREAST: ICD-10-CM

## 2020-08-28 DIAGNOSIS — D64.9 ANEMIA, UNSPECIFIED TYPE: ICD-10-CM

## 2020-08-28 LAB
ALBUMIN SERPL BCP-MCNC: 3.6 G/DL (ref 3.5–5.2)
ALP SERPL-CCNC: 72 U/L (ref 55–135)
ALT SERPL W/O P-5'-P-CCNC: 18 U/L (ref 10–44)
ANION GAP SERPL CALC-SCNC: 9 MMOL/L (ref 8–16)
AST SERPL-CCNC: 19 U/L (ref 10–40)
BASOPHILS # BLD AUTO: 0.05 K/UL (ref 0–0.2)
BASOPHILS NFR BLD: 0.4 % (ref 0–1.9)
BILIRUB SERPL-MCNC: 0.6 MG/DL (ref 0.1–1)
BUN SERPL-MCNC: 11 MG/DL (ref 8–23)
CALCIUM SERPL-MCNC: 10.2 MG/DL (ref 8.7–10.5)
CEA SERPL-MCNC: <1 NG/ML (ref 0–5)
CHLORIDE SERPL-SCNC: 104 MMOL/L (ref 95–110)
CO2 SERPL-SCNC: 31 MMOL/L (ref 23–29)
CREAT SERPL-MCNC: 0.9 MG/DL (ref 0.5–1.4)
DIFFERENTIAL METHOD: ABNORMAL
EOSINOPHIL # BLD AUTO: 0.2 K/UL (ref 0–0.5)
EOSINOPHIL NFR BLD: 1.3 % (ref 0–8)
ERYTHROCYTE [DISTWIDTH] IN BLOOD BY AUTOMATED COUNT: 14.2 % (ref 11.5–14.5)
EST. GFR  (AFRICAN AMERICAN): >60 ML/MIN/1.73 M^2
EST. GFR  (NON AFRICAN AMERICAN): >60 ML/MIN/1.73 M^2
GLUCOSE SERPL-MCNC: 167 MG/DL (ref 70–110)
HCT VFR BLD AUTO: 33 % (ref 37–48.5)
HGB BLD-MCNC: 10.8 G/DL (ref 12–16)
IMM GRANULOCYTES # BLD AUTO: 0.05 K/UL (ref 0–0.04)
IMM GRANULOCYTES NFR BLD AUTO: 0.4 % (ref 0–0.5)
LYMPHOCYTES # BLD AUTO: 3.7 K/UL (ref 1–4.8)
LYMPHOCYTES NFR BLD: 30.5 % (ref 18–48)
MCH RBC QN AUTO: 27.8 PG (ref 27–31)
MCHC RBC AUTO-ENTMCNC: 32.7 G/DL (ref 32–36)
MCV RBC AUTO: 85 FL (ref 82–98)
MONOCYTES # BLD AUTO: 0.5 K/UL (ref 0.3–1)
MONOCYTES NFR BLD: 4.2 % (ref 4–15)
NEUTROPHILS # BLD AUTO: 7.7 K/UL (ref 1.8–7.7)
NEUTROPHILS NFR BLD: 63.2 % (ref 38–73)
NRBC BLD-RTO: 0 /100 WBC
PLATELET # BLD AUTO: 252 K/UL (ref 150–350)
PMV BLD AUTO: 11.8 FL (ref 9.2–12.9)
POTASSIUM SERPL-SCNC: 3.7 MMOL/L (ref 3.5–5.1)
PROT SERPL-MCNC: 7.2 G/DL (ref 6–8.4)
RBC # BLD AUTO: 3.89 M/UL (ref 4–5.4)
SODIUM SERPL-SCNC: 144 MMOL/L (ref 136–145)
WBC # BLD AUTO: 12.15 K/UL (ref 3.9–12.7)

## 2020-08-28 PROCEDURE — 82378 CARCINOEMBRYONIC ANTIGEN: CPT | Mod: HCNC

## 2020-08-28 PROCEDURE — 36415 COLL VENOUS BLD VENIPUNCTURE: CPT | Mod: HCNC,PO

## 2020-08-28 PROCEDURE — 85025 COMPLETE CBC W/AUTO DIFF WBC: CPT | Mod: HCNC

## 2020-08-28 PROCEDURE — 86300 IMMUNOASSAY TUMOR CA 15-3: CPT | Mod: HCNC

## 2020-08-28 PROCEDURE — 80053 COMPREHEN METABOLIC PANEL: CPT | Mod: HCNC

## 2020-08-31 LAB — CANCER AG27-29 SERPL-ACNC: 22.5 U/ML

## 2020-09-01 ENCOUNTER — OFFICE VISIT (OUTPATIENT)
Dept: HEMATOLOGY/ONCOLOGY | Facility: CLINIC | Age: 65
End: 2020-09-01
Payer: MEDICARE

## 2020-09-01 VITALS
SYSTOLIC BLOOD PRESSURE: 116 MMHG | TEMPERATURE: 98 F | WEIGHT: 229.75 LBS | OXYGEN SATURATION: 96 % | BODY MASS INDEX: 36.06 KG/M2 | HEIGHT: 67 IN | HEART RATE: 77 BPM | DIASTOLIC BLOOD PRESSURE: 75 MMHG

## 2020-09-01 DIAGNOSIS — C50.911 MALIGNANT NEOPLASM OF RIGHT BREAST IN FEMALE, ESTROGEN RECEPTOR POSITIVE, UNSPECIFIED SITE OF BREAST: Primary | ICD-10-CM

## 2020-09-01 DIAGNOSIS — Z17.0 MALIGNANT NEOPLASM OF RIGHT BREAST IN FEMALE, ESTROGEN RECEPTOR POSITIVE, UNSPECIFIED SITE OF BREAST: Primary | ICD-10-CM

## 2020-09-01 DIAGNOSIS — D64.9 ANEMIA, UNSPECIFIED TYPE: ICD-10-CM

## 2020-09-01 DIAGNOSIS — Z85.3 HISTORY OF BREAST CANCER: ICD-10-CM

## 2020-09-01 PROCEDURE — 99999 PR PBB SHADOW E&M-EST. PATIENT-LVL III: ICD-10-PCS | Mod: PBBFAC,HCNC,, | Performed by: INTERNAL MEDICINE

## 2020-09-01 PROCEDURE — 1101F PT FALLS ASSESS-DOCD LE1/YR: CPT | Mod: HCNC,CPTII,S$GLB, | Performed by: INTERNAL MEDICINE

## 2020-09-01 PROCEDURE — 3074F SYST BP LT 130 MM HG: CPT | Mod: HCNC,CPTII,S$GLB, | Performed by: INTERNAL MEDICINE

## 2020-09-01 PROCEDURE — 99213 PR OFFICE/OUTPT VISIT, EST, LEVL III, 20-29 MIN: ICD-10-PCS | Mod: HCNC,S$GLB,, | Performed by: INTERNAL MEDICINE

## 2020-09-01 PROCEDURE — 3078F DIAST BP <80 MM HG: CPT | Mod: HCNC,CPTII,S$GLB, | Performed by: INTERNAL MEDICINE

## 2020-09-01 PROCEDURE — 99999 PR PBB SHADOW E&M-EST. PATIENT-LVL III: CPT | Mod: PBBFAC,HCNC,, | Performed by: INTERNAL MEDICINE

## 2020-09-01 PROCEDURE — 3008F PR BODY MASS INDEX (BMI) DOCUMENTED: ICD-10-PCS | Mod: HCNC,CPTII,S$GLB, | Performed by: INTERNAL MEDICINE

## 2020-09-01 PROCEDURE — 1101F PR PT FALLS ASSESS DOC 0-1 FALLS W/OUT INJ PAST YR: ICD-10-PCS | Mod: HCNC,CPTII,S$GLB, | Performed by: INTERNAL MEDICINE

## 2020-09-01 PROCEDURE — 3078F PR MOST RECENT DIASTOLIC BLOOD PRESSURE < 80 MM HG: ICD-10-PCS | Mod: HCNC,CPTII,S$GLB, | Performed by: INTERNAL MEDICINE

## 2020-09-01 PROCEDURE — 99213 OFFICE O/P EST LOW 20 MIN: CPT | Mod: HCNC,S$GLB,, | Performed by: INTERNAL MEDICINE

## 2020-09-01 PROCEDURE — 3074F PR MOST RECENT SYSTOLIC BLOOD PRESSURE < 130 MM HG: ICD-10-PCS | Mod: HCNC,CPTII,S$GLB, | Performed by: INTERNAL MEDICINE

## 2020-09-01 PROCEDURE — 3008F BODY MASS INDEX DOCD: CPT | Mod: HCNC,CPTII,S$GLB, | Performed by: INTERNAL MEDICINE

## 2020-10-06 ENCOUNTER — LAB VISIT (OUTPATIENT)
Dept: LAB | Facility: HOSPITAL | Age: 65
End: 2020-10-06
Attending: NURSE PRACTITIONER
Payer: MEDICARE

## 2020-10-06 DIAGNOSIS — E89.0 HYPOTHYROIDISM, POSTSURGICAL: ICD-10-CM

## 2020-10-06 LAB
ESTIMATED AVG GLUCOSE: 177 MG/DL (ref 68–131)
HBA1C MFR BLD HPLC: 7.8 % (ref 4–5.6)
T4 FREE SERPL-MCNC: 1.37 NG/DL (ref 0.71–1.51)
TSH SERPL DL<=0.005 MIU/L-ACNC: 0.05 UIU/ML (ref 0.4–4)

## 2020-10-06 PROCEDURE — 83036 HEMOGLOBIN GLYCOSYLATED A1C: CPT | Mod: HCNC

## 2020-10-06 PROCEDURE — 36415 COLL VENOUS BLD VENIPUNCTURE: CPT | Mod: HCNC,PO

## 2020-10-06 PROCEDURE — 84443 ASSAY THYROID STIM HORMONE: CPT | Mod: HCNC

## 2020-10-06 PROCEDURE — 84439 ASSAY OF FREE THYROXINE: CPT | Mod: HCNC

## 2020-10-13 ENCOUNTER — OFFICE VISIT (OUTPATIENT)
Dept: FAMILY MEDICINE | Facility: CLINIC | Age: 65
End: 2020-10-13
Payer: MEDICARE

## 2020-10-13 VITALS
SYSTOLIC BLOOD PRESSURE: 120 MMHG | BODY MASS INDEX: 35.96 KG/M2 | OXYGEN SATURATION: 97 % | HEIGHT: 66 IN | HEART RATE: 75 BPM | WEIGHT: 223.75 LBS | TEMPERATURE: 98 F | DIASTOLIC BLOOD PRESSURE: 70 MMHG

## 2020-10-13 DIAGNOSIS — D57.3 SICKLE CELL TRAIT: ICD-10-CM

## 2020-10-13 DIAGNOSIS — E11.65 UNCONTROLLED TYPE 2 DIABETES MELLITUS WITH HYPERGLYCEMIA: ICD-10-CM

## 2020-10-13 DIAGNOSIS — I10 ESSENTIAL HYPERTENSION: Primary | ICD-10-CM

## 2020-10-13 DIAGNOSIS — M85.89 OSTEOPENIA OF MULTIPLE SITES: ICD-10-CM

## 2020-10-13 DIAGNOSIS — E55.9 VITAMIN D DEFICIENCY DISEASE: ICD-10-CM

## 2020-10-13 DIAGNOSIS — E66.01 SEVERE OBESITY (BMI 35.0-35.9 WITH COMORBIDITY): ICD-10-CM

## 2020-10-13 DIAGNOSIS — Z23 NEED FOR IMMUNIZATION AGAINST INFLUENZA: ICD-10-CM

## 2020-10-13 DIAGNOSIS — E78.5 HYPERLIPIDEMIA WITH TARGET LDL LESS THAN 100: ICD-10-CM

## 2020-10-13 DIAGNOSIS — E89.0 HYPOTHYROIDISM, POSTSURGICAL: ICD-10-CM

## 2020-10-13 DIAGNOSIS — Z85.3 HX: BREAST CANCER: ICD-10-CM

## 2020-10-13 PROCEDURE — 90694 FLU VACCINE - QUADRIVALENT - ADJUVANTED: ICD-10-PCS | Mod: HCNC,S$GLB,, | Performed by: NURSE PRACTITIONER

## 2020-10-13 PROCEDURE — 3051F PR MOST RECENT HEMOGLOBIN A1C LEVEL 7.0 - < 8.0%: ICD-10-PCS | Mod: HCNC,CPTII,S$GLB, | Performed by: NURSE PRACTITIONER

## 2020-10-13 PROCEDURE — 99999 PR PBB SHADOW E&M-EST. PATIENT-LVL IV: ICD-10-PCS | Mod: PBBFAC,HCNC,, | Performed by: NURSE PRACTITIONER

## 2020-10-13 PROCEDURE — 3008F PR BODY MASS INDEX (BMI) DOCUMENTED: ICD-10-PCS | Mod: HCNC,CPTII,S$GLB, | Performed by: NURSE PRACTITIONER

## 2020-10-13 PROCEDURE — 3078F PR MOST RECENT DIASTOLIC BLOOD PRESSURE < 80 MM HG: ICD-10-PCS | Mod: HCNC,CPTII,S$GLB, | Performed by: NURSE PRACTITIONER

## 2020-10-13 PROCEDURE — 99214 PR OFFICE/OUTPT VISIT, EST, LEVL IV, 30-39 MIN: ICD-10-PCS | Mod: HCNC,25,S$GLB, | Performed by: NURSE PRACTITIONER

## 2020-10-13 PROCEDURE — 3008F BODY MASS INDEX DOCD: CPT | Mod: HCNC,CPTII,S$GLB, | Performed by: NURSE PRACTITIONER

## 2020-10-13 PROCEDURE — 3051F HG A1C>EQUAL 7.0%<8.0%: CPT | Mod: HCNC,CPTII,S$GLB, | Performed by: NURSE PRACTITIONER

## 2020-10-13 PROCEDURE — 3078F DIAST BP <80 MM HG: CPT | Mod: HCNC,CPTII,S$GLB, | Performed by: NURSE PRACTITIONER

## 2020-10-13 PROCEDURE — 99214 OFFICE O/P EST MOD 30 MIN: CPT | Mod: HCNC,25,S$GLB, | Performed by: NURSE PRACTITIONER

## 2020-10-13 PROCEDURE — 99999 PR PBB SHADOW E&M-EST. PATIENT-LVL IV: CPT | Mod: PBBFAC,HCNC,, | Performed by: NURSE PRACTITIONER

## 2020-10-13 PROCEDURE — G0008 ADMIN INFLUENZA VIRUS VAC: HCPCS | Mod: HCNC,S$GLB,, | Performed by: NURSE PRACTITIONER

## 2020-10-13 PROCEDURE — 3074F SYST BP LT 130 MM HG: CPT | Mod: HCNC,CPTII,S$GLB, | Performed by: NURSE PRACTITIONER

## 2020-10-13 PROCEDURE — 1101F PR PT FALLS ASSESS DOC 0-1 FALLS W/OUT INJ PAST YR: ICD-10-PCS | Mod: HCNC,CPTII,S$GLB, | Performed by: NURSE PRACTITIONER

## 2020-10-13 PROCEDURE — 90694 VACC AIIV4 NO PRSRV 0.5ML IM: CPT | Mod: HCNC,S$GLB,, | Performed by: NURSE PRACTITIONER

## 2020-10-13 PROCEDURE — 3074F PR MOST RECENT SYSTOLIC BLOOD PRESSURE < 130 MM HG: ICD-10-PCS | Mod: HCNC,CPTII,S$GLB, | Performed by: NURSE PRACTITIONER

## 2020-10-13 PROCEDURE — G0008 FLU VACCINE - QUADRIVALENT - ADJUVANTED: ICD-10-PCS | Mod: HCNC,S$GLB,, | Performed by: NURSE PRACTITIONER

## 2020-10-13 PROCEDURE — 1101F PT FALLS ASSESS-DOCD LE1/YR: CPT | Mod: HCNC,CPTII,S$GLB, | Performed by: NURSE PRACTITIONER

## 2020-10-13 NOTE — PATIENT INSTRUCTIONS
Must watch diet very closely  Continue exercising  Continue all current medications   Flu shot today   Get a copy of colonoscopy done at [facility]   HgB A1C in 3 months with follow up with Dr. Bailey 1/4/2021 as scheduled   Schedule a appt with her eye doctor before the end of the year   Get tetanus and shingles vaccine at your pharmacy

## 2020-10-13 NOTE — PROGRESS NOTES
Subjective:       Patient ID: Masha Garcia is a 65 y.o. female.    Chief Complaint: Gynecologic Exam, Results (Labs), and Flu Vaccine    65-year-old female presents to the clinic today for follow-up on diabetes.  I explained to her that her hemoglobin A1c was 7.8.  She is admits to having very poor dietary habits.  She does a exercise tape t 3 times a week for 30 minutes.  She is compliant with all of her medications.  She is already on the max dose of glipizide, metformin, and Trajenta.  She does not want to take any injections.  I stressed the importance that if she did get her hemoglobin A1c under control she is going to require an injection once a week or insulin.  She states her blood sugars run anywhere from 160-180.  She does not need a Pap smear since she is 65 years old and has had a complete hysterectomy.  She denies any vaginal bleeding.  She has a history of breast cancer which is followed by oncology.  She will call and schedule an appointment with Dr. Lindsay for her yearly eye exam.  His number was given to her.  She states that she had a colonoscopy done at [facility] about 2 years ago.  She does want her flu shot today. I explained to her that she need to get her tetanus and shingles vaccine at her pharmacy due to her insurance.  She denies any headaches, dizziness, or blurred vision.  She denies any cardiac chest pain, heart palpitations, shortness breath, or swelling to lower extremities.    Review of Systems   Respiratory: Negative for cough, shortness of breath and wheezing.    Cardiovascular: Negative for chest pain, palpitations and leg swelling.   Gastrointestinal: Negative for abdominal pain, diarrhea, nausea and vomiting.   Neurological: Negative for dizziness, light-headedness and headaches.         Objective:      Physical Exam  Vitals signs reviewed.   Constitutional:       General: She is not in acute distress.     Appearance: Normal appearance. She is obese. She is not  ill-appearing, toxic-appearing or diaphoretic.   Neck:      Musculoskeletal: Normal range of motion and neck supple.      Vascular: No carotid bruit.   Cardiovascular:      Rate and Rhythm: Normal rate and regular rhythm.      Heart sounds: Normal heart sounds. No murmur.   Pulmonary:      Effort: Pulmonary effort is normal.      Breath sounds: Normal breath sounds. No wheezing or rhonchi.   Abdominal:      General: Bowel sounds are normal.      Palpations: Abdomen is soft.      Tenderness: There is no abdominal tenderness.   Musculoskeletal: Normal range of motion.         General: Swelling present.   Skin:     General: Skin is warm and dry.   Neurological:      Mental Status: She is alert and oriented to person, place, and time.   Psychiatric:         Mood and Affect: Mood normal.         Behavior: Behavior normal.         Thought Content: Thought content normal.         Assessment:       1. Essential hypertension    2. Vitamin D deficiency disease    3. Hypothyroidism, postsurgical    4. Severe obesity (BMI 35.0-35.9 with comorbidity)    5. Hyperlipidemia with target LDL less than 100    6. HX: breast cancer    7. Sickle cell trait    8. Osteopenia of multiple sites    9. Need for immunization against influenza    10. Uncontrolled type 2 diabetes mellitus with hyperglycemia        Plan:         Essential hypertension  - The current medical regimen is effective;  continue present plan and medications.    Vitamin D deficiency disease  - The current medical regimen is effective;  continue present plan and medications.    Hypothyroidism, postsurgical  - The current medical regimen is effective;  continue present plan and medications.    Severe obesity (BMI 35.0-35.9 with comorbidity)  - The patient is asked to make an attempt to improve diet and exercise patterns to aid in medical management of this problem.    Hyperlipidemia with target LDL less than 100  - The current medical regimen is effective;  continue present  plan and medications.    HX: breast cancer  - followed by oncology    Sickle cell trait  - stable, observe, asymptomatic     Osteopenia of multiple sites  - on calcium and vit d     Need for immunization against influenza  -     Influenza (FLUAD) - Quadrivalent (Adjuvanted) *Preferred* (65+) (PF)    Uncontrolled type 2 diabetes mellitus with hyperglycemia  -     Hemoglobin A1C; Future; Expected date: 10/13/2020  - continue current medications as this time   - does not want to add any type of injections  - will watch diet more closely

## 2020-11-02 ENCOUNTER — PES CALL (OUTPATIENT)
Dept: ADMINISTRATIVE | Facility: CLINIC | Age: 65
End: 2020-11-02

## 2020-11-05 DIAGNOSIS — I10 ESSENTIAL HYPERTENSION: ICD-10-CM

## 2020-11-05 DIAGNOSIS — Z11.4 SCREENING FOR HIV (HUMAN IMMUNODEFICIENCY VIRUS): Primary | ICD-10-CM

## 2020-11-05 RX ORDER — HYDROCHLOROTHIAZIDE 25 MG/1
TABLET ORAL
Qty: 90 TABLET | Refills: 0 | Status: SHIPPED | OUTPATIENT
Start: 2020-11-05 | End: 2021-03-08

## 2020-11-25 DIAGNOSIS — E11.65 UNCONTROLLED TYPE 2 DIABETES MELLITUS WITH HYPERGLYCEMIA: Primary | ICD-10-CM

## 2020-12-15 ENCOUNTER — OFFICE VISIT (OUTPATIENT)
Dept: OPTOMETRY | Facility: CLINIC | Age: 65
End: 2020-12-15
Payer: COMMERCIAL

## 2020-12-15 DIAGNOSIS — H52.03 HYPEROPIA WITH PRESBYOPIA OF BOTH EYES: ICD-10-CM

## 2020-12-15 DIAGNOSIS — Z01.00 EYE EXAM, ROUTINE: Primary | ICD-10-CM

## 2020-12-15 DIAGNOSIS — H52.4 HYPEROPIA WITH PRESBYOPIA OF BOTH EYES: ICD-10-CM

## 2020-12-15 LAB
LEFT EYE DM RETINOPATHY: NEGATIVE
RIGHT EYE DM RETINOPATHY: NEGATIVE

## 2020-12-15 PROCEDURE — 92014 COMPRE OPH EXAM EST PT 1/>: CPT | Mod: HCNC,S$GLB,, | Performed by: OPTOMETRIST

## 2020-12-15 PROCEDURE — 92014 PR EYE EXAM, EST PATIENT,COMPREHESV: ICD-10-PCS | Mod: HCNC,S$GLB,, | Performed by: OPTOMETRIST

## 2020-12-15 PROCEDURE — 99999 PR PBB SHADOW E&M-EST. PATIENT-LVL III: ICD-10-PCS | Mod: PBBFAC,HCNC,, | Performed by: OPTOMETRIST

## 2020-12-15 PROCEDURE — 99999 PR PBB SHADOW E&M-EST. PATIENT-LVL III: CPT | Mod: PBBFAC,HCNC,, | Performed by: OPTOMETRIST

## 2020-12-15 PROCEDURE — 92015 PR REFRACTION: ICD-10-PCS | Mod: HCNC,S$GLB,, | Performed by: OPTOMETRIST

## 2020-12-15 PROCEDURE — 92015 DETERMINE REFRACTIVE STATE: CPT | Mod: HCNC,S$GLB,, | Performed by: OPTOMETRIST

## 2020-12-15 NOTE — PROGRESS NOTES
HPI     Annual [facility] vision exam   No vision issues would like to update rx  (-)Flashes (-)Floaters  (-)Itch, (-)tear, (-)burn, (--)Dryness. (-) OTC Drops   (-)Photophobia  (-)Glare (-)diplopia (--) headaches          Last edited by Ray Lindsay, OD on 12/15/2020  1:41 PM. (History)            Assessment /Plan     For exam results, see Encounter Report.    Eye exam, routine  -[facility]    Hyperopia with presbyopia of both eyes  Eyeglass Final Rx     Eyeglass Final Rx       Sphere Cylinder Axis Dist VA Add    Right +1.75 Sphere  20/20 +2.50    Left +1.75 +0.75 010 20/20 +2.50    Type: PAL    Expiration Date: 12/16/2021                  RTC 1 yr

## 2020-12-23 DIAGNOSIS — E11.65 UNCONTROLLED TYPE 2 DIABETES MELLITUS WITH HYPERGLYCEMIA: ICD-10-CM

## 2020-12-27 PROBLEM — Z17.0 MALIGNANT NEOPLASM OF RIGHT BREAST IN FEMALE, ESTROGEN RECEPTOR POSITIVE: Status: RESOLVED | Noted: 2019-07-15 | Resolved: 2020-12-27

## 2020-12-27 PROBLEM — Z85.3 HISTORY OF BREAST CANCER: Status: RESOLVED | Noted: 2020-09-01 | Resolved: 2020-12-27

## 2020-12-27 PROBLEM — C50.911 MALIGNANT NEOPLASM OF RIGHT BREAST IN FEMALE, ESTROGEN RECEPTOR POSITIVE: Status: RESOLVED | Noted: 2019-07-15 | Resolved: 2020-12-27

## 2020-12-27 PROBLEM — D64.9 ANEMIA: Status: RESOLVED | Noted: 2019-07-15 | Resolved: 2020-12-27

## 2020-12-30 ENCOUNTER — LAB VISIT (OUTPATIENT)
Dept: LAB | Facility: HOSPITAL | Age: 65
End: 2020-12-30
Attending: NURSE PRACTITIONER
Payer: MEDICARE

## 2020-12-30 DIAGNOSIS — E11.65 UNCONTROLLED TYPE 2 DIABETES MELLITUS WITH HYPERGLYCEMIA: ICD-10-CM

## 2020-12-30 DIAGNOSIS — Z11.4 SCREENING FOR HIV (HUMAN IMMUNODEFICIENCY VIRUS): ICD-10-CM

## 2020-12-30 LAB
ESTIMATED AVG GLUCOSE: 174 MG/DL (ref 68–131)
HBA1C MFR BLD HPLC: 7.7 % (ref 4–5.6)

## 2020-12-30 PROCEDURE — 36415 COLL VENOUS BLD VENIPUNCTURE: CPT | Mod: HCNC,PO

## 2020-12-30 PROCEDURE — 83036 HEMOGLOBIN GLYCOSYLATED A1C: CPT | Mod: HCNC

## 2020-12-30 PROCEDURE — 86703 HIV-1/HIV-2 1 RESULT ANTBDY: CPT | Mod: HCNC

## 2020-12-31 LAB — HIV 1+2 AB+HIV1 P24 AG SERPL QL IA: NEGATIVE

## 2021-01-04 ENCOUNTER — OFFICE VISIT (OUTPATIENT)
Dept: FAMILY MEDICINE | Facility: CLINIC | Age: 66
End: 2021-01-04
Payer: MEDICARE

## 2021-01-04 VITALS
BODY MASS INDEX: 36.14 KG/M2 | HEART RATE: 69 BPM | TEMPERATURE: 97 F | SYSTOLIC BLOOD PRESSURE: 130 MMHG | WEIGHT: 224.88 LBS | DIASTOLIC BLOOD PRESSURE: 70 MMHG | OXYGEN SATURATION: 99 % | HEIGHT: 66 IN

## 2021-01-04 DIAGNOSIS — E66.01 SEVERE OBESITY (BMI 35.0-35.9 WITH COMORBIDITY): ICD-10-CM

## 2021-01-04 DIAGNOSIS — Z78.0 OSTEOPENIA AFTER MENOPAUSE: ICD-10-CM

## 2021-01-04 DIAGNOSIS — D57.3 SICKLE CELL TRAIT: ICD-10-CM

## 2021-01-04 DIAGNOSIS — E78.5 HYPERLIPIDEMIA WITH TARGET LDL LESS THAN 100: ICD-10-CM

## 2021-01-04 DIAGNOSIS — I10 ESSENTIAL HYPERTENSION: ICD-10-CM

## 2021-01-04 DIAGNOSIS — Z23 NEED FOR SHINGLES VACCINE: ICD-10-CM

## 2021-01-04 DIAGNOSIS — Z71.89 ADVANCED DIRECTIVES, COUNSELING/DISCUSSION: ICD-10-CM

## 2021-01-04 DIAGNOSIS — M85.80 OSTEOPENIA AFTER MENOPAUSE: ICD-10-CM

## 2021-01-04 DIAGNOSIS — E89.0 HYPOTHYROIDISM, POSTSURGICAL: ICD-10-CM

## 2021-01-04 DIAGNOSIS — Z00.00 ROUTINE MEDICAL EXAM: Primary | ICD-10-CM

## 2021-01-04 DIAGNOSIS — E11.65 UNCONTROLLED TYPE 2 DIABETES MELLITUS WITH HYPERGLYCEMIA: ICD-10-CM

## 2021-01-04 PROCEDURE — 1101F PT FALLS ASSESS-DOCD LE1/YR: CPT | Mod: HCNC,CPTII,S$GLB, | Performed by: INTERNAL MEDICINE

## 2021-01-04 PROCEDURE — 99999 PR PBB SHADOW E&M-EST. PATIENT-LVL IV: CPT | Mod: PBBFAC,HCNC,, | Performed by: INTERNAL MEDICINE

## 2021-01-04 PROCEDURE — 3051F PR MOST RECENT HEMOGLOBIN A1C LEVEL 7.0 - < 8.0%: ICD-10-PCS | Mod: HCNC,CPTII,S$GLB, | Performed by: INTERNAL MEDICINE

## 2021-01-04 PROCEDURE — 3075F PR MOST RECENT SYSTOLIC BLOOD PRESS GE 130-139MM HG: ICD-10-PCS | Mod: HCNC,CPTII,S$GLB, | Performed by: INTERNAL MEDICINE

## 2021-01-04 PROCEDURE — 3051F HG A1C>EQUAL 7.0%<8.0%: CPT | Mod: HCNC,CPTII,S$GLB, | Performed by: INTERNAL MEDICINE

## 2021-01-04 PROCEDURE — 3288F PR FALLS RISK ASSESSMENT DOCUMENTED: ICD-10-PCS | Mod: HCNC,CPTII,S$GLB, | Performed by: INTERNAL MEDICINE

## 2021-01-04 PROCEDURE — 3072F LOW RISK FOR RETINOPATHY: CPT | Mod: HCNC,S$GLB,, | Performed by: INTERNAL MEDICINE

## 2021-01-04 PROCEDURE — 3072F PR LOW RISK FOR RETINOPATHY: ICD-10-PCS | Mod: HCNC,S$GLB,, | Performed by: INTERNAL MEDICINE

## 2021-01-04 PROCEDURE — 3008F BODY MASS INDEX DOCD: CPT | Mod: HCNC,CPTII,S$GLB, | Performed by: INTERNAL MEDICINE

## 2021-01-04 PROCEDURE — 3288F FALL RISK ASSESSMENT DOCD: CPT | Mod: HCNC,CPTII,S$GLB, | Performed by: INTERNAL MEDICINE

## 2021-01-04 PROCEDURE — 1101F PR PT FALLS ASSESS DOC 0-1 FALLS W/OUT INJ PAST YR: ICD-10-PCS | Mod: HCNC,CPTII,S$GLB, | Performed by: INTERNAL MEDICINE

## 2021-01-04 PROCEDURE — 3008F PR BODY MASS INDEX (BMI) DOCUMENTED: ICD-10-PCS | Mod: HCNC,CPTII,S$GLB, | Performed by: INTERNAL MEDICINE

## 2021-01-04 PROCEDURE — 3078F PR MOST RECENT DIASTOLIC BLOOD PRESSURE < 80 MM HG: ICD-10-PCS | Mod: HCNC,CPTII,S$GLB, | Performed by: INTERNAL MEDICINE

## 2021-01-04 PROCEDURE — 3075F SYST BP GE 130 - 139MM HG: CPT | Mod: HCNC,CPTII,S$GLB, | Performed by: INTERNAL MEDICINE

## 2021-01-04 PROCEDURE — 99397 PER PM REEVAL EST PAT 65+ YR: CPT | Mod: HCNC,S$GLB,, | Performed by: INTERNAL MEDICINE

## 2021-01-04 PROCEDURE — 3078F DIAST BP <80 MM HG: CPT | Mod: HCNC,CPTII,S$GLB, | Performed by: INTERNAL MEDICINE

## 2021-01-04 PROCEDURE — 1126F PR PAIN SEVERITY QUANTIFIED, NO PAIN PRESENT: ICD-10-PCS | Mod: HCNC,S$GLB,, | Performed by: INTERNAL MEDICINE

## 2021-01-04 PROCEDURE — 99999 PR PBB SHADOW E&M-EST. PATIENT-LVL IV: ICD-10-PCS | Mod: PBBFAC,HCNC,, | Performed by: INTERNAL MEDICINE

## 2021-01-04 PROCEDURE — 99397 PR PREVENTIVE VISIT,EST,65 & OVER: ICD-10-PCS | Mod: HCNC,S$GLB,, | Performed by: INTERNAL MEDICINE

## 2021-01-04 PROCEDURE — 1126F AMNT PAIN NOTED NONE PRSNT: CPT | Mod: HCNC,S$GLB,, | Performed by: INTERNAL MEDICINE

## 2021-01-07 ENCOUNTER — OFFICE VISIT (OUTPATIENT)
Dept: HEMATOLOGY/ONCOLOGY | Facility: CLINIC | Age: 66
End: 2021-01-07
Payer: MEDICARE

## 2021-01-07 VITALS
HEART RATE: 71 BPM | SYSTOLIC BLOOD PRESSURE: 134 MMHG | TEMPERATURE: 98 F | DIASTOLIC BLOOD PRESSURE: 85 MMHG | OXYGEN SATURATION: 96 % | HEIGHT: 66 IN | WEIGHT: 229.25 LBS | BODY MASS INDEX: 36.84 KG/M2

## 2021-01-07 DIAGNOSIS — Z85.3 HISTORY OF BREAST CANCER: ICD-10-CM

## 2021-01-07 DIAGNOSIS — C50.911 MALIGNANT NEOPLASM OF RIGHT BREAST IN FEMALE, ESTROGEN RECEPTOR POSITIVE, UNSPECIFIED SITE OF BREAST: Primary | ICD-10-CM

## 2021-01-07 DIAGNOSIS — D64.9 ANEMIA, UNSPECIFIED TYPE: ICD-10-CM

## 2021-01-07 DIAGNOSIS — Z17.0 MALIGNANT NEOPLASM OF RIGHT BREAST IN FEMALE, ESTROGEN RECEPTOR POSITIVE, UNSPECIFIED SITE OF BREAST: Primary | ICD-10-CM

## 2021-01-07 PROCEDURE — 1126F PR PAIN SEVERITY QUANTIFIED, NO PAIN PRESENT: ICD-10-PCS | Mod: HCNC,S$GLB,, | Performed by: INTERNAL MEDICINE

## 2021-01-07 PROCEDURE — 1126F AMNT PAIN NOTED NONE PRSNT: CPT | Mod: HCNC,S$GLB,, | Performed by: INTERNAL MEDICINE

## 2021-01-07 PROCEDURE — 3008F BODY MASS INDEX DOCD: CPT | Mod: HCNC,CPTII,S$GLB, | Performed by: INTERNAL MEDICINE

## 2021-01-07 PROCEDURE — 99213 OFFICE O/P EST LOW 20 MIN: CPT | Mod: HCNC,S$GLB,, | Performed by: INTERNAL MEDICINE

## 2021-01-07 PROCEDURE — 1101F PT FALLS ASSESS-DOCD LE1/YR: CPT | Mod: HCNC,CPTII,S$GLB, | Performed by: INTERNAL MEDICINE

## 2021-01-07 PROCEDURE — 3072F PR LOW RISK FOR RETINOPATHY: ICD-10-PCS | Mod: HCNC,S$GLB,, | Performed by: INTERNAL MEDICINE

## 2021-01-07 PROCEDURE — 1101F PR PT FALLS ASSESS DOC 0-1 FALLS W/OUT INJ PAST YR: ICD-10-PCS | Mod: HCNC,CPTII,S$GLB, | Performed by: INTERNAL MEDICINE

## 2021-01-07 PROCEDURE — 3079F DIAST BP 80-89 MM HG: CPT | Mod: HCNC,CPTII,S$GLB, | Performed by: INTERNAL MEDICINE

## 2021-01-07 PROCEDURE — 3008F PR BODY MASS INDEX (BMI) DOCUMENTED: ICD-10-PCS | Mod: HCNC,CPTII,S$GLB, | Performed by: INTERNAL MEDICINE

## 2021-01-07 PROCEDURE — 3288F FALL RISK ASSESSMENT DOCD: CPT | Mod: HCNC,CPTII,S$GLB, | Performed by: INTERNAL MEDICINE

## 2021-01-07 PROCEDURE — 3079F PR MOST RECENT DIASTOLIC BLOOD PRESSURE 80-89 MM HG: ICD-10-PCS | Mod: HCNC,CPTII,S$GLB, | Performed by: INTERNAL MEDICINE

## 2021-01-07 PROCEDURE — 3075F SYST BP GE 130 - 139MM HG: CPT | Mod: HCNC,CPTII,S$GLB, | Performed by: INTERNAL MEDICINE

## 2021-01-07 PROCEDURE — 3075F PR MOST RECENT SYSTOLIC BLOOD PRESS GE 130-139MM HG: ICD-10-PCS | Mod: HCNC,CPTII,S$GLB, | Performed by: INTERNAL MEDICINE

## 2021-01-07 PROCEDURE — 99213 PR OFFICE/OUTPT VISIT, EST, LEVL III, 20-29 MIN: ICD-10-PCS | Mod: HCNC,S$GLB,, | Performed by: INTERNAL MEDICINE

## 2021-01-07 PROCEDURE — 3288F PR FALLS RISK ASSESSMENT DOCUMENTED: ICD-10-PCS | Mod: HCNC,CPTII,S$GLB, | Performed by: INTERNAL MEDICINE

## 2021-01-07 PROCEDURE — 99999 PR PBB SHADOW E&M-EST. PATIENT-LVL III: ICD-10-PCS | Mod: PBBFAC,HCNC,, | Performed by: INTERNAL MEDICINE

## 2021-01-07 PROCEDURE — 3072F LOW RISK FOR RETINOPATHY: CPT | Mod: HCNC,S$GLB,, | Performed by: INTERNAL MEDICINE

## 2021-01-07 PROCEDURE — 99999 PR PBB SHADOW E&M-EST. PATIENT-LVL III: CPT | Mod: PBBFAC,HCNC,, | Performed by: INTERNAL MEDICINE

## 2021-02-11 ENCOUNTER — TELEPHONE (OUTPATIENT)
Dept: FAMILY MEDICINE | Facility: CLINIC | Age: 66
End: 2021-02-11

## 2021-02-11 ENCOUNTER — TELEPHONE (OUTPATIENT)
Dept: HEMATOLOGY/ONCOLOGY | Facility: CLINIC | Age: 66
End: 2021-02-11

## 2021-02-22 RX ORDER — LINAGLIPTIN 5 MG/1
5 TABLET, FILM COATED ORAL DAILY
Qty: 90 TABLET | Refills: 1 | Status: CANCELLED | OUTPATIENT
Start: 2021-02-22

## 2021-02-22 RX ORDER — LINAGLIPTIN 5 MG/1
5 TABLET, FILM COATED ORAL DAILY
Qty: 90 TABLET | Refills: 1 | Status: SHIPPED | OUTPATIENT
Start: 2021-02-22 | End: 2021-08-27 | Stop reason: SDUPTHER

## 2021-03-08 DIAGNOSIS — I10 ESSENTIAL HYPERTENSION: ICD-10-CM

## 2021-03-08 RX ORDER — HYDROCHLOROTHIAZIDE 25 MG/1
TABLET ORAL
Qty: 90 TABLET | Refills: 1 | Status: SHIPPED | OUTPATIENT
Start: 2021-03-08 | End: 2021-07-23

## 2021-04-09 ENCOUNTER — TELEPHONE (OUTPATIENT)
Dept: FAMILY MEDICINE | Facility: CLINIC | Age: 66
End: 2021-04-09

## 2021-04-14 DIAGNOSIS — E11.9 TYPE 2 DIABETES MELLITUS WITHOUT COMPLICATION: ICD-10-CM

## 2021-04-22 DIAGNOSIS — I10 ESSENTIAL HYPERTENSION: ICD-10-CM

## 2021-04-22 RX ORDER — LOSARTAN POTASSIUM 25 MG/1
25 TABLET ORAL DAILY
Qty: 90 TABLET | Refills: 0 | Status: SHIPPED | OUTPATIENT
Start: 2021-04-22 | End: 2021-06-30

## 2021-05-05 ENCOUNTER — LAB VISIT (OUTPATIENT)
Dept: LAB | Facility: HOSPITAL | Age: 66
End: 2021-05-05
Attending: INTERNAL MEDICINE
Payer: MEDICARE

## 2021-05-05 DIAGNOSIS — Z17.0 MALIGNANT NEOPLASM OF RIGHT BREAST IN FEMALE, ESTROGEN RECEPTOR POSITIVE, UNSPECIFIED SITE OF BREAST: ICD-10-CM

## 2021-05-05 DIAGNOSIS — Z85.3 HISTORY OF BREAST CANCER: ICD-10-CM

## 2021-05-05 DIAGNOSIS — D64.9 ANEMIA, UNSPECIFIED TYPE: ICD-10-CM

## 2021-05-05 DIAGNOSIS — C50.911 MALIGNANT NEOPLASM OF RIGHT BREAST IN FEMALE, ESTROGEN RECEPTOR POSITIVE, UNSPECIFIED SITE OF BREAST: ICD-10-CM

## 2021-05-05 LAB
ALBUMIN SERPL BCP-MCNC: 3.4 G/DL (ref 3.5–5.2)
ALP SERPL-CCNC: 70 U/L (ref 55–135)
ALT SERPL W/O P-5'-P-CCNC: 16 U/L (ref 10–44)
ANION GAP SERPL CALC-SCNC: 7 MMOL/L (ref 8–16)
AST SERPL-CCNC: 16 U/L (ref 10–40)
BASOPHILS # BLD AUTO: 0.04 K/UL (ref 0–0.2)
BASOPHILS NFR BLD: 0.3 % (ref 0–1.9)
BILIRUB SERPL-MCNC: 0.6 MG/DL (ref 0.1–1)
BUN SERPL-MCNC: 11 MG/DL (ref 8–23)
CALCIUM SERPL-MCNC: 9.7 MG/DL (ref 8.7–10.5)
CEA SERPL-MCNC: <1 NG/ML (ref 0–5)
CHLORIDE SERPL-SCNC: 104 MMOL/L (ref 95–110)
CO2 SERPL-SCNC: 31 MMOL/L (ref 23–29)
CREAT SERPL-MCNC: 0.8 MG/DL (ref 0.5–1.4)
DIFFERENTIAL METHOD: ABNORMAL
EOSINOPHIL # BLD AUTO: 0.1 K/UL (ref 0–0.5)
EOSINOPHIL NFR BLD: 0.7 % (ref 0–8)
ERYTHROCYTE [DISTWIDTH] IN BLOOD BY AUTOMATED COUNT: 14.4 % (ref 11.5–14.5)
EST. GFR  (AFRICAN AMERICAN): >60 ML/MIN/1.73 M^2
EST. GFR  (NON AFRICAN AMERICAN): >60 ML/MIN/1.73 M^2
GLUCOSE SERPL-MCNC: 161 MG/DL (ref 70–110)
HCT VFR BLD AUTO: 32.6 % (ref 37–48.5)
HGB BLD-MCNC: 10.9 G/DL (ref 12–16)
IMM GRANULOCYTES # BLD AUTO: 0.07 K/UL (ref 0–0.04)
IMM GRANULOCYTES NFR BLD AUTO: 0.6 % (ref 0–0.5)
LYMPHOCYTES # BLD AUTO: 4.1 K/UL (ref 1–4.8)
LYMPHOCYTES NFR BLD: 34.8 % (ref 18–48)
MCH RBC QN AUTO: 27.9 PG (ref 27–31)
MCHC RBC AUTO-ENTMCNC: 33.4 G/DL (ref 32–36)
MCV RBC AUTO: 84 FL (ref 82–98)
MONOCYTES # BLD AUTO: 0.4 K/UL (ref 0.3–1)
MONOCYTES NFR BLD: 3.5 % (ref 4–15)
NEUTROPHILS # BLD AUTO: 7 K/UL (ref 1.8–7.7)
NEUTROPHILS NFR BLD: 60.1 % (ref 38–73)
NRBC BLD-RTO: 0 /100 WBC
PLATELET # BLD AUTO: 257 K/UL (ref 150–450)
PMV BLD AUTO: 11.7 FL (ref 9.2–12.9)
POTASSIUM SERPL-SCNC: 3.6 MMOL/L (ref 3.5–5.1)
PROT SERPL-MCNC: 7.2 G/DL (ref 6–8.4)
RBC # BLD AUTO: 3.9 M/UL (ref 4–5.4)
SODIUM SERPL-SCNC: 142 MMOL/L (ref 136–145)
WBC # BLD AUTO: 11.72 K/UL (ref 3.9–12.7)

## 2021-05-05 PROCEDURE — 36415 COLL VENOUS BLD VENIPUNCTURE: CPT | Mod: PO | Performed by: INTERNAL MEDICINE

## 2021-05-05 PROCEDURE — 82378 CARCINOEMBRYONIC ANTIGEN: CPT | Performed by: INTERNAL MEDICINE

## 2021-05-05 PROCEDURE — 80053 COMPREHEN METABOLIC PANEL: CPT | Performed by: INTERNAL MEDICINE

## 2021-05-05 PROCEDURE — 86300 IMMUNOASSAY TUMOR CA 15-3: CPT | Performed by: INTERNAL MEDICINE

## 2021-05-05 PROCEDURE — 85025 COMPLETE CBC W/AUTO DIFF WBC: CPT | Performed by: INTERNAL MEDICINE

## 2021-05-07 ENCOUNTER — OFFICE VISIT (OUTPATIENT)
Dept: HEMATOLOGY/ONCOLOGY | Facility: CLINIC | Age: 66
End: 2021-05-07
Payer: MEDICARE

## 2021-05-07 VITALS
BODY MASS INDEX: 36.39 KG/M2 | TEMPERATURE: 99 F | OXYGEN SATURATION: 98 % | SYSTOLIC BLOOD PRESSURE: 122 MMHG | HEIGHT: 66 IN | HEART RATE: 68 BPM | WEIGHT: 226.44 LBS | DIASTOLIC BLOOD PRESSURE: 83 MMHG

## 2021-05-07 DIAGNOSIS — Z85.3 HISTORY OF BREAST CANCER: ICD-10-CM

## 2021-05-07 DIAGNOSIS — C50.911 MALIGNANT NEOPLASM OF RIGHT BREAST IN FEMALE, ESTROGEN RECEPTOR POSITIVE, UNSPECIFIED SITE OF BREAST: Primary | ICD-10-CM

## 2021-05-07 DIAGNOSIS — Z17.0 MALIGNANT NEOPLASM OF RIGHT BREAST IN FEMALE, ESTROGEN RECEPTOR POSITIVE, UNSPECIFIED SITE OF BREAST: Primary | ICD-10-CM

## 2021-05-07 DIAGNOSIS — D64.9 ANEMIA, UNSPECIFIED TYPE: ICD-10-CM

## 2021-05-07 LAB — CANCER AG27-29 SERPL-ACNC: 23.2 U/ML

## 2021-05-07 PROCEDURE — 3072F PR LOW RISK FOR RETINOPATHY: ICD-10-PCS | Mod: S$GLB,,, | Performed by: INTERNAL MEDICINE

## 2021-05-07 PROCEDURE — 1126F PR PAIN SEVERITY QUANTIFIED, NO PAIN PRESENT: ICD-10-PCS | Mod: S$GLB,,, | Performed by: INTERNAL MEDICINE

## 2021-05-07 PROCEDURE — 99999 PR PBB SHADOW E&M-EST. PATIENT-LVL III: CPT | Mod: PBBFAC,,, | Performed by: INTERNAL MEDICINE

## 2021-05-07 PROCEDURE — 1101F PR PT FALLS ASSESS DOC 0-1 FALLS W/OUT INJ PAST YR: ICD-10-PCS | Mod: CPTII,S$GLB,, | Performed by: INTERNAL MEDICINE

## 2021-05-07 PROCEDURE — 3288F PR FALLS RISK ASSESSMENT DOCUMENTED: ICD-10-PCS | Mod: CPTII,S$GLB,, | Performed by: INTERNAL MEDICINE

## 2021-05-07 PROCEDURE — 1159F MED LIST DOCD IN RCRD: CPT | Mod: S$GLB,,, | Performed by: INTERNAL MEDICINE

## 2021-05-07 PROCEDURE — 1101F PT FALLS ASSESS-DOCD LE1/YR: CPT | Mod: CPTII,S$GLB,, | Performed by: INTERNAL MEDICINE

## 2021-05-07 PROCEDURE — 1159F PR MEDICATION LIST DOCUMENTED IN MEDICAL RECORD: ICD-10-PCS | Mod: S$GLB,,, | Performed by: INTERNAL MEDICINE

## 2021-05-07 PROCEDURE — 1126F AMNT PAIN NOTED NONE PRSNT: CPT | Mod: S$GLB,,, | Performed by: INTERNAL MEDICINE

## 2021-05-07 PROCEDURE — 3008F BODY MASS INDEX DOCD: CPT | Mod: CPTII,S$GLB,, | Performed by: INTERNAL MEDICINE

## 2021-05-07 PROCEDURE — 3288F FALL RISK ASSESSMENT DOCD: CPT | Mod: CPTII,S$GLB,, | Performed by: INTERNAL MEDICINE

## 2021-05-07 PROCEDURE — 99213 OFFICE O/P EST LOW 20 MIN: CPT | Mod: S$GLB,,, | Performed by: INTERNAL MEDICINE

## 2021-05-07 PROCEDURE — 99999 PR PBB SHADOW E&M-EST. PATIENT-LVL III: ICD-10-PCS | Mod: PBBFAC,,, | Performed by: INTERNAL MEDICINE

## 2021-05-07 PROCEDURE — 3072F LOW RISK FOR RETINOPATHY: CPT | Mod: S$GLB,,, | Performed by: INTERNAL MEDICINE

## 2021-05-07 PROCEDURE — 3008F PR BODY MASS INDEX (BMI) DOCUMENTED: ICD-10-PCS | Mod: CPTII,S$GLB,, | Performed by: INTERNAL MEDICINE

## 2021-05-07 PROCEDURE — 99213 PR OFFICE/OUTPT VISIT, EST, LEVL III, 20-29 MIN: ICD-10-PCS | Mod: S$GLB,,, | Performed by: INTERNAL MEDICINE

## 2021-06-23 DIAGNOSIS — E11.9 TYPE 2 DIABETES MELLITUS WITHOUT COMPLICATION: ICD-10-CM

## 2021-07-06 DIAGNOSIS — E89.0 HYPOTHYROIDISM, POSTSURGICAL: ICD-10-CM

## 2021-07-06 DIAGNOSIS — E11.65 UNCONTROLLED TYPE 2 DIABETES MELLITUS WITH HYPERGLYCEMIA: ICD-10-CM

## 2021-07-06 DIAGNOSIS — I10 ESSENTIAL HYPERTENSION: ICD-10-CM

## 2021-07-06 DIAGNOSIS — E78.5 HYPERLIPIDEMIA WITH TARGET LDL LESS THAN 100: Primary | ICD-10-CM

## 2021-07-06 PROBLEM — C50.911 MALIGNANT NEOPLASM OF RIGHT BREAST IN FEMALE, ESTROGEN RECEPTOR POSITIVE: Status: RESOLVED | Noted: 2018-07-25 | Resolved: 2021-07-06

## 2021-07-06 PROBLEM — Z17.0 MALIGNANT NEOPLASM OF RIGHT BREAST IN FEMALE, ESTROGEN RECEPTOR POSITIVE: Status: RESOLVED | Noted: 2018-07-25 | Resolved: 2021-07-06

## 2021-07-07 ENCOUNTER — TELEPHONE (OUTPATIENT)
Dept: FAMILY MEDICINE | Facility: CLINIC | Age: 66
End: 2021-07-07

## 2021-07-07 LAB — BCS RECOMMENDATION EXT: NORMAL

## 2021-07-10 ENCOUNTER — LAB VISIT (OUTPATIENT)
Dept: LAB | Facility: HOSPITAL | Age: 66
End: 2021-07-10
Attending: INTERNAL MEDICINE
Payer: MEDICARE

## 2021-07-10 DIAGNOSIS — E11.65 UNCONTROLLED TYPE 2 DIABETES MELLITUS WITH HYPERGLYCEMIA: ICD-10-CM

## 2021-07-10 LAB
ALBUMIN/CREAT UR: 32.4 UG/MG (ref 0–30)
CREAT UR-MCNC: 176 MG/DL (ref 15–325)
MICROALBUMIN UR DL<=1MG/L-MCNC: 57 UG/ML

## 2021-07-10 PROCEDURE — 82570 ASSAY OF URINE CREATININE: CPT | Performed by: INTERNAL MEDICINE

## 2021-07-10 PROCEDURE — 82043 UR ALBUMIN QUANTITATIVE: CPT | Performed by: INTERNAL MEDICINE

## 2021-07-14 ENCOUNTER — OFFICE VISIT (OUTPATIENT)
Dept: FAMILY MEDICINE | Facility: CLINIC | Age: 66
End: 2021-07-14
Payer: MEDICARE

## 2021-07-14 VITALS
DIASTOLIC BLOOD PRESSURE: 60 MMHG | BODY MASS INDEX: 36.72 KG/M2 | TEMPERATURE: 98 F | OXYGEN SATURATION: 96 % | SYSTOLIC BLOOD PRESSURE: 132 MMHG | HEART RATE: 69 BPM | WEIGHT: 227.5 LBS

## 2021-07-14 DIAGNOSIS — J98.8 CONGESTION OF UPPER AIRWAY: ICD-10-CM

## 2021-07-14 DIAGNOSIS — D57.3 SICKLE CELL TRAIT: ICD-10-CM

## 2021-07-14 DIAGNOSIS — M85.80 OSTEOPENIA AFTER MENOPAUSE: ICD-10-CM

## 2021-07-14 DIAGNOSIS — Z12.31 ENCOUNTER FOR SCREENING MAMMOGRAM FOR BREAST CANCER: ICD-10-CM

## 2021-07-14 DIAGNOSIS — E78.5 HYPERLIPIDEMIA WITH TARGET LDL LESS THAN 100: ICD-10-CM

## 2021-07-14 DIAGNOSIS — I10 ESSENTIAL HYPERTENSION: ICD-10-CM

## 2021-07-14 DIAGNOSIS — E11.65 UNCONTROLLED TYPE 2 DIABETES MELLITUS WITH HYPERGLYCEMIA: Primary | ICD-10-CM

## 2021-07-14 DIAGNOSIS — Z78.0 OSTEOPENIA AFTER MENOPAUSE: ICD-10-CM

## 2021-07-14 DIAGNOSIS — M25.472 LEFT ANKLE SWELLING: ICD-10-CM

## 2021-07-14 DIAGNOSIS — E89.0 HYPOTHYROIDISM, POSTSURGICAL: ICD-10-CM

## 2021-07-14 DIAGNOSIS — E66.01 SEVERE OBESITY (BMI 35.0-35.9 WITH COMORBIDITY): ICD-10-CM

## 2021-07-14 DIAGNOSIS — F43.9 STRESS AT HOME: ICD-10-CM

## 2021-07-14 DIAGNOSIS — Z23 NEED FOR SHINGLES VACCINE: ICD-10-CM

## 2021-07-14 DIAGNOSIS — Z71.89 ADVANCED DIRECTIVES, COUNSELING/DISCUSSION: ICD-10-CM

## 2021-07-14 DIAGNOSIS — E55.9 VITAMIN D DEFICIENCY DISEASE: ICD-10-CM

## 2021-07-14 PROCEDURE — 1126F PR PAIN SEVERITY QUANTIFIED, NO PAIN PRESENT: ICD-10-PCS | Mod: S$GLB,,, | Performed by: INTERNAL MEDICINE

## 2021-07-14 PROCEDURE — 1159F PR MEDICATION LIST DOCUMENTED IN MEDICAL RECORD: ICD-10-PCS | Mod: S$GLB,,, | Performed by: INTERNAL MEDICINE

## 2021-07-14 PROCEDURE — 99499 UNLISTED E&M SERVICE: CPT | Mod: HCNC,S$GLB,, | Performed by: INTERNAL MEDICINE

## 2021-07-14 PROCEDURE — 3288F FALL RISK ASSESSMENT DOCD: CPT | Mod: CPTII,S$GLB,, | Performed by: INTERNAL MEDICINE

## 2021-07-14 PROCEDURE — 99214 PR OFFICE/OUTPT VISIT, EST, LEVL IV, 30-39 MIN: ICD-10-PCS | Mod: S$GLB,,, | Performed by: INTERNAL MEDICINE

## 2021-07-14 PROCEDURE — 99214 OFFICE O/P EST MOD 30 MIN: CPT | Mod: S$GLB,,, | Performed by: INTERNAL MEDICINE

## 2021-07-14 PROCEDURE — 3072F PR LOW RISK FOR RETINOPATHY: ICD-10-PCS | Mod: S$GLB,,, | Performed by: INTERNAL MEDICINE

## 2021-07-14 PROCEDURE — 1101F PT FALLS ASSESS-DOCD LE1/YR: CPT | Mod: CPTII,S$GLB,, | Performed by: INTERNAL MEDICINE

## 2021-07-14 PROCEDURE — 1101F PR PT FALLS ASSESS DOC 0-1 FALLS W/OUT INJ PAST YR: ICD-10-PCS | Mod: CPTII,S$GLB,, | Performed by: INTERNAL MEDICINE

## 2021-07-14 PROCEDURE — 99499 RISK ADDL DX/OHS AUDIT: ICD-10-PCS | Mod: HCNC,S$GLB,, | Performed by: INTERNAL MEDICINE

## 2021-07-14 PROCEDURE — 99999 PR PBB SHADOW E&M-EST. PATIENT-LVL IV: ICD-10-PCS | Mod: PBBFAC,,, | Performed by: INTERNAL MEDICINE

## 2021-07-14 PROCEDURE — 99999 PR PBB SHADOW E&M-EST. PATIENT-LVL IV: CPT | Mod: PBBFAC,,, | Performed by: INTERNAL MEDICINE

## 2021-07-14 PROCEDURE — 3008F BODY MASS INDEX DOCD: CPT | Mod: CPTII,S$GLB,, | Performed by: INTERNAL MEDICINE

## 2021-07-14 PROCEDURE — 3008F PR BODY MASS INDEX (BMI) DOCUMENTED: ICD-10-PCS | Mod: CPTII,S$GLB,, | Performed by: INTERNAL MEDICINE

## 2021-07-14 PROCEDURE — 3078F PR MOST RECENT DIASTOLIC BLOOD PRESSURE < 80 MM HG: ICD-10-PCS | Mod: CPTII,S$GLB,, | Performed by: INTERNAL MEDICINE

## 2021-07-14 PROCEDURE — 3075F SYST BP GE 130 - 139MM HG: CPT | Mod: CPTII,S$GLB,, | Performed by: INTERNAL MEDICINE

## 2021-07-14 PROCEDURE — 3075F PR MOST RECENT SYSTOLIC BLOOD PRESS GE 130-139MM HG: ICD-10-PCS | Mod: CPTII,S$GLB,, | Performed by: INTERNAL MEDICINE

## 2021-07-14 PROCEDURE — 3072F LOW RISK FOR RETINOPATHY: CPT | Mod: S$GLB,,, | Performed by: INTERNAL MEDICINE

## 2021-07-14 PROCEDURE — 1126F AMNT PAIN NOTED NONE PRSNT: CPT | Mod: S$GLB,,, | Performed by: INTERNAL MEDICINE

## 2021-07-14 PROCEDURE — 3052F HG A1C>EQUAL 8.0%<EQUAL 9.0%: CPT | Mod: CPTII,S$GLB,, | Performed by: INTERNAL MEDICINE

## 2021-07-14 PROCEDURE — 3078F DIAST BP <80 MM HG: CPT | Mod: CPTII,S$GLB,, | Performed by: INTERNAL MEDICINE

## 2021-07-14 PROCEDURE — 3052F PR MOST RECENT HEMOGLOBIN A1C LEVEL 8.0 - < 9.0%: ICD-10-PCS | Mod: CPTII,S$GLB,, | Performed by: INTERNAL MEDICINE

## 2021-07-14 PROCEDURE — 1159F MED LIST DOCD IN RCRD: CPT | Mod: S$GLB,,, | Performed by: INTERNAL MEDICINE

## 2021-07-14 PROCEDURE — 3288F PR FALLS RISK ASSESSMENT DOCUMENTED: ICD-10-PCS | Mod: CPTII,S$GLB,, | Performed by: INTERNAL MEDICINE

## 2021-07-23 DIAGNOSIS — I10 ESSENTIAL HYPERTENSION: ICD-10-CM

## 2021-07-23 RX ORDER — HYDROCHLOROTHIAZIDE 25 MG/1
TABLET ORAL
Qty: 90 TABLET | Refills: 1 | Status: SHIPPED | OUTPATIENT
Start: 2021-07-23 | End: 2021-11-29 | Stop reason: SDUPTHER

## 2021-07-26 DIAGNOSIS — E78.5 HYPERLIPIDEMIA WITH TARGET LDL LESS THAN 100: ICD-10-CM

## 2021-07-26 RX ORDER — LOVASTATIN 20 MG/1
TABLET ORAL
Qty: 90 TABLET | Refills: 1 | Status: SHIPPED | OUTPATIENT
Start: 2021-07-26 | End: 2021-11-29 | Stop reason: SDUPTHER

## 2021-07-26 RX ORDER — METFORMIN HYDROCHLORIDE 500 MG/1
TABLET, EXTENDED RELEASE ORAL
Qty: 360 TABLET | Refills: 1 | Status: SHIPPED | OUTPATIENT
Start: 2021-07-26 | End: 2021-11-05

## 2021-08-27 RX ORDER — LINAGLIPTIN 5 MG/1
5 TABLET, FILM COATED ORAL DAILY
Qty: 90 TABLET | Refills: 0 | Status: SHIPPED | OUTPATIENT
Start: 2021-08-27 | End: 2021-12-21

## 2021-09-14 DIAGNOSIS — I10 ESSENTIAL HYPERTENSION: ICD-10-CM

## 2021-09-15 RX ORDER — LOSARTAN POTASSIUM 25 MG/1
25 TABLET ORAL DAILY
Qty: 90 TABLET | Refills: 0 | Status: SHIPPED | OUTPATIENT
Start: 2021-09-15 | End: 2021-11-11

## 2021-09-16 ENCOUNTER — PES CALL (OUTPATIENT)
Dept: ADMINISTRATIVE | Facility: CLINIC | Age: 66
End: 2021-09-16

## 2021-10-06 RX ORDER — GLIPIZIDE 10 MG/1
TABLET, FILM COATED, EXTENDED RELEASE ORAL
Qty: 180 TABLET | Refills: 0 | Status: SHIPPED | OUTPATIENT
Start: 2021-10-06 | End: 2021-11-29 | Stop reason: SDUPTHER

## 2021-10-07 ENCOUNTER — TELEPHONE (OUTPATIENT)
Dept: FAMILY MEDICINE | Facility: CLINIC | Age: 66
End: 2021-10-07

## 2021-10-11 ENCOUNTER — LAB VISIT (OUTPATIENT)
Dept: LAB | Facility: HOSPITAL | Age: 66
End: 2021-10-11
Attending: INTERNAL MEDICINE
Payer: MEDICARE

## 2021-10-11 DIAGNOSIS — E11.9 TYPE 2 DIABETES MELLITUS WITHOUT COMPLICATION: ICD-10-CM

## 2021-10-11 LAB
ESTIMATED AVG GLUCOSE: 226 MG/DL (ref 68–131)
HBA1C MFR BLD: 9.5 % (ref 4–5.6)

## 2021-10-11 PROCEDURE — 36415 COLL VENOUS BLD VENIPUNCTURE: CPT | Mod: HCNC,PO | Performed by: INTERNAL MEDICINE

## 2021-10-11 PROCEDURE — 83036 HEMOGLOBIN GLYCOSYLATED A1C: CPT | Mod: HCNC | Performed by: INTERNAL MEDICINE

## 2021-10-12 ENCOUNTER — TELEPHONE (OUTPATIENT)
Dept: FAMILY MEDICINE | Facility: CLINIC | Age: 66
End: 2021-10-12

## 2021-10-12 DIAGNOSIS — E11.65 UNCONTROLLED TYPE 2 DIABETES MELLITUS WITH HYPERGLYCEMIA: Primary | ICD-10-CM

## 2021-10-14 ENCOUNTER — TELEPHONE (OUTPATIENT)
Dept: HEMATOLOGY/ONCOLOGY | Facility: CLINIC | Age: 66
End: 2021-10-14

## 2021-10-28 ENCOUNTER — LAB VISIT (OUTPATIENT)
Dept: LAB | Facility: HOSPITAL | Age: 66
End: 2021-10-28
Attending: INTERNAL MEDICINE
Payer: MEDICARE

## 2021-10-28 DIAGNOSIS — Z17.0 MALIGNANT NEOPLASM OF RIGHT BREAST IN FEMALE, ESTROGEN RECEPTOR POSITIVE, UNSPECIFIED SITE OF BREAST: ICD-10-CM

## 2021-10-28 DIAGNOSIS — Z85.3 HISTORY OF BREAST CANCER: ICD-10-CM

## 2021-10-28 DIAGNOSIS — D64.9 ANEMIA, UNSPECIFIED TYPE: ICD-10-CM

## 2021-10-28 DIAGNOSIS — C50.911 MALIGNANT NEOPLASM OF RIGHT BREAST IN FEMALE, ESTROGEN RECEPTOR POSITIVE, UNSPECIFIED SITE OF BREAST: ICD-10-CM

## 2021-10-28 LAB
ALBUMIN SERPL BCP-MCNC: 3.5 G/DL (ref 3.5–5.2)
ALP SERPL-CCNC: 72 U/L (ref 55–135)
ALT SERPL W/O P-5'-P-CCNC: 26 U/L (ref 10–44)
ANION GAP SERPL CALC-SCNC: 12 MMOL/L (ref 8–16)
AST SERPL-CCNC: 24 U/L (ref 10–40)
BASOPHILS # BLD AUTO: 0.04 K/UL (ref 0–0.2)
BASOPHILS NFR BLD: 0.4 % (ref 0–1.9)
BILIRUB SERPL-MCNC: 0.6 MG/DL (ref 0.1–1)
BUN SERPL-MCNC: 12 MG/DL (ref 8–23)
CALCIUM SERPL-MCNC: 10.5 MG/DL (ref 8.7–10.5)
CEA SERPL-MCNC: 1.2 NG/ML (ref 0–5)
CHLORIDE SERPL-SCNC: 105 MMOL/L (ref 95–110)
CO2 SERPL-SCNC: 25 MMOL/L (ref 23–29)
CREAT SERPL-MCNC: 0.9 MG/DL (ref 0.5–1.4)
DIFFERENTIAL METHOD: ABNORMAL
EOSINOPHIL # BLD AUTO: 0.1 K/UL (ref 0–0.5)
EOSINOPHIL NFR BLD: 1.2 % (ref 0–8)
ERYTHROCYTE [DISTWIDTH] IN BLOOD BY AUTOMATED COUNT: 13.9 % (ref 11.5–14.5)
EST. GFR  (AFRICAN AMERICAN): >60 ML/MIN/1.73 M^2
EST. GFR  (NON AFRICAN AMERICAN): >60 ML/MIN/1.73 M^2
GLUCOSE SERPL-MCNC: 188 MG/DL (ref 70–110)
HCT VFR BLD AUTO: 33.4 % (ref 37–48.5)
HGB BLD-MCNC: 11.1 G/DL (ref 12–16)
IMM GRANULOCYTES # BLD AUTO: 0.06 K/UL (ref 0–0.04)
IMM GRANULOCYTES NFR BLD AUTO: 0.5 % (ref 0–0.5)
LYMPHOCYTES # BLD AUTO: 3.8 K/UL (ref 1–4.8)
LYMPHOCYTES NFR BLD: 33.1 % (ref 18–48)
MCH RBC QN AUTO: 28 PG (ref 27–31)
MCHC RBC AUTO-ENTMCNC: 33.2 G/DL (ref 32–36)
MCV RBC AUTO: 84 FL (ref 82–98)
MONOCYTES # BLD AUTO: 0.5 K/UL (ref 0.3–1)
MONOCYTES NFR BLD: 4.8 % (ref 4–15)
NEUTROPHILS # BLD AUTO: 6.8 K/UL (ref 1.8–7.7)
NEUTROPHILS NFR BLD: 60 % (ref 38–73)
NRBC BLD-RTO: 0 /100 WBC
PLATELET # BLD AUTO: 228 K/UL (ref 150–450)
PMV BLD AUTO: 12.4 FL (ref 9.2–12.9)
POTASSIUM SERPL-SCNC: 3.8 MMOL/L (ref 3.5–5.1)
PROT SERPL-MCNC: 7.5 G/DL (ref 6–8.4)
RBC # BLD AUTO: 3.97 M/UL (ref 4–5.4)
SODIUM SERPL-SCNC: 142 MMOL/L (ref 136–145)
WBC # BLD AUTO: 11.32 K/UL (ref 3.9–12.7)

## 2021-10-28 PROCEDURE — 86300 IMMUNOASSAY TUMOR CA 15-3: CPT | Mod: HCNC | Performed by: INTERNAL MEDICINE

## 2021-10-28 PROCEDURE — 36415 COLL VENOUS BLD VENIPUNCTURE: CPT | Mod: HCNC,PO | Performed by: INTERNAL MEDICINE

## 2021-10-28 PROCEDURE — 80053 COMPREHEN METABOLIC PANEL: CPT | Mod: HCNC | Performed by: INTERNAL MEDICINE

## 2021-10-28 PROCEDURE — 82378 CARCINOEMBRYONIC ANTIGEN: CPT | Mod: HCNC | Performed by: INTERNAL MEDICINE

## 2021-10-28 PROCEDURE — 85025 COMPLETE CBC W/AUTO DIFF WBC: CPT | Mod: HCNC | Performed by: INTERNAL MEDICINE

## 2021-11-01 LAB — CANCER AG27-29 SERPL-ACNC: 26.5 U/ML

## 2021-11-02 ENCOUNTER — OFFICE VISIT (OUTPATIENT)
Dept: HEMATOLOGY/ONCOLOGY | Facility: CLINIC | Age: 66
End: 2021-11-02
Payer: MEDICARE

## 2021-11-02 VITALS
HEIGHT: 66 IN | SYSTOLIC BLOOD PRESSURE: 121 MMHG | WEIGHT: 221.81 LBS | TEMPERATURE: 98 F | OXYGEN SATURATION: 98 % | HEART RATE: 76 BPM | BODY MASS INDEX: 35.65 KG/M2 | DIASTOLIC BLOOD PRESSURE: 64 MMHG

## 2021-11-02 DIAGNOSIS — N95.1 HOT FLASHES DUE TO MENOPAUSE: ICD-10-CM

## 2021-11-02 DIAGNOSIS — D64.9 ANEMIA, UNSPECIFIED TYPE: ICD-10-CM

## 2021-11-02 DIAGNOSIS — Z17.0 MALIGNANT NEOPLASM OF RIGHT BREAST IN FEMALE, ESTROGEN RECEPTOR POSITIVE, UNSPECIFIED SITE OF BREAST: Primary | ICD-10-CM

## 2021-11-02 DIAGNOSIS — C50.911 MALIGNANT NEOPLASM OF RIGHT BREAST IN FEMALE, ESTROGEN RECEPTOR POSITIVE, UNSPECIFIED SITE OF BREAST: Primary | ICD-10-CM

## 2021-11-02 PROCEDURE — 1126F PR PAIN SEVERITY QUANTIFIED, NO PAIN PRESENT: ICD-10-PCS | Mod: HCNC,CPTII,S$GLB, | Performed by: INTERNAL MEDICINE

## 2021-11-02 PROCEDURE — 4010F ACE/ARB THERAPY RXD/TAKEN: CPT | Mod: HCNC,CPTII,S$GLB, | Performed by: INTERNAL MEDICINE

## 2021-11-02 PROCEDURE — 1101F PT FALLS ASSESS-DOCD LE1/YR: CPT | Mod: HCNC,CPTII,S$GLB, | Performed by: INTERNAL MEDICINE

## 2021-11-02 PROCEDURE — 3046F HEMOGLOBIN A1C LEVEL >9.0%: CPT | Mod: HCNC,CPTII,S$GLB, | Performed by: INTERNAL MEDICINE

## 2021-11-02 PROCEDURE — 1159F MED LIST DOCD IN RCRD: CPT | Mod: HCNC,CPTII,S$GLB, | Performed by: INTERNAL MEDICINE

## 2021-11-02 PROCEDURE — 3072F PR LOW RISK FOR RETINOPATHY: ICD-10-PCS | Mod: HCNC,CPTII,S$GLB, | Performed by: INTERNAL MEDICINE

## 2021-11-02 PROCEDURE — 3066F NEPHROPATHY DOC TX: CPT | Mod: HCNC,CPTII,S$GLB, | Performed by: INTERNAL MEDICINE

## 2021-11-02 PROCEDURE — 99214 OFFICE O/P EST MOD 30 MIN: CPT | Mod: HCNC,S$GLB,, | Performed by: INTERNAL MEDICINE

## 2021-11-02 PROCEDURE — 3008F PR BODY MASS INDEX (BMI) DOCUMENTED: ICD-10-PCS | Mod: HCNC,CPTII,S$GLB, | Performed by: INTERNAL MEDICINE

## 2021-11-02 PROCEDURE — 3288F FALL RISK ASSESSMENT DOCD: CPT | Mod: HCNC,CPTII,S$GLB, | Performed by: INTERNAL MEDICINE

## 2021-11-02 PROCEDURE — 3078F DIAST BP <80 MM HG: CPT | Mod: HCNC,CPTII,S$GLB, | Performed by: INTERNAL MEDICINE

## 2021-11-02 PROCEDURE — 99999 PR PBB SHADOW E&M-EST. PATIENT-LVL IV: CPT | Mod: PBBFAC,HCNC,, | Performed by: INTERNAL MEDICINE

## 2021-11-02 PROCEDURE — 3072F LOW RISK FOR RETINOPATHY: CPT | Mod: HCNC,CPTII,S$GLB, | Performed by: INTERNAL MEDICINE

## 2021-11-02 PROCEDURE — 1126F AMNT PAIN NOTED NONE PRSNT: CPT | Mod: HCNC,CPTII,S$GLB, | Performed by: INTERNAL MEDICINE

## 2021-11-02 PROCEDURE — 3060F PR POS MICROALBUMINURIA RESULT DOCUMENTED/REVIEW: ICD-10-PCS | Mod: HCNC,CPTII,S$GLB, | Performed by: INTERNAL MEDICINE

## 2021-11-02 PROCEDURE — 99214 PR OFFICE/OUTPT VISIT, EST, LEVL IV, 30-39 MIN: ICD-10-PCS | Mod: HCNC,S$GLB,, | Performed by: INTERNAL MEDICINE

## 2021-11-02 PROCEDURE — 99999 PR PBB SHADOW E&M-EST. PATIENT-LVL IV: ICD-10-PCS | Mod: PBBFAC,HCNC,, | Performed by: INTERNAL MEDICINE

## 2021-11-02 PROCEDURE — 3078F PR MOST RECENT DIASTOLIC BLOOD PRESSURE < 80 MM HG: ICD-10-PCS | Mod: HCNC,CPTII,S$GLB, | Performed by: INTERNAL MEDICINE

## 2021-11-02 PROCEDURE — 1159F PR MEDICATION LIST DOCUMENTED IN MEDICAL RECORD: ICD-10-PCS | Mod: HCNC,CPTII,S$GLB, | Performed by: INTERNAL MEDICINE

## 2021-11-02 PROCEDURE — 3074F PR MOST RECENT SYSTOLIC BLOOD PRESSURE < 130 MM HG: ICD-10-PCS | Mod: HCNC,CPTII,S$GLB, | Performed by: INTERNAL MEDICINE

## 2021-11-02 PROCEDURE — 3074F SYST BP LT 130 MM HG: CPT | Mod: HCNC,CPTII,S$GLB, | Performed by: INTERNAL MEDICINE

## 2021-11-02 PROCEDURE — 1101F PR PT FALLS ASSESS DOC 0-1 FALLS W/OUT INJ PAST YR: ICD-10-PCS | Mod: HCNC,CPTII,S$GLB, | Performed by: INTERNAL MEDICINE

## 2021-11-02 PROCEDURE — 3066F PR DOCUMENTATION OF TREATMENT FOR NEPHROPATHY: ICD-10-PCS | Mod: HCNC,CPTII,S$GLB, | Performed by: INTERNAL MEDICINE

## 2021-11-02 PROCEDURE — 3008F BODY MASS INDEX DOCD: CPT | Mod: HCNC,CPTII,S$GLB, | Performed by: INTERNAL MEDICINE

## 2021-11-02 PROCEDURE — 3288F PR FALLS RISK ASSESSMENT DOCUMENTED: ICD-10-PCS | Mod: HCNC,CPTII,S$GLB, | Performed by: INTERNAL MEDICINE

## 2021-11-02 PROCEDURE — 3060F POS MICROALBUMINURIA REV: CPT | Mod: HCNC,CPTII,S$GLB, | Performed by: INTERNAL MEDICINE

## 2021-11-02 PROCEDURE — 3046F PR MOST RECENT HEMOGLOBIN A1C LEVEL > 9.0%: ICD-10-PCS | Mod: HCNC,CPTII,S$GLB, | Performed by: INTERNAL MEDICINE

## 2021-11-02 PROCEDURE — 4010F PR ACE/ARB THEARPY RXD/TAKEN: ICD-10-PCS | Mod: HCNC,CPTII,S$GLB, | Performed by: INTERNAL MEDICINE

## 2021-11-05 RX ORDER — METFORMIN HYDROCHLORIDE 500 MG/1
TABLET, EXTENDED RELEASE ORAL
Qty: 360 TABLET | Refills: 0 | Status: SHIPPED | OUTPATIENT
Start: 2021-11-05 | End: 2021-11-29 | Stop reason: SDUPTHER

## 2021-11-08 ENCOUNTER — CLINICAL SUPPORT (OUTPATIENT)
Dept: DIABETES | Facility: CLINIC | Age: 66
End: 2021-11-08
Payer: MEDICARE

## 2021-11-08 VITALS — BODY MASS INDEX: 36.08 KG/M2 | WEIGHT: 223.56 LBS

## 2021-11-08 DIAGNOSIS — E11.65 UNCONTROLLED TYPE 2 DIABETES MELLITUS WITH HYPERGLYCEMIA: ICD-10-CM

## 2021-11-08 PROCEDURE — 99999 PR PBB SHADOW E&M-EST. PATIENT-LVL II: ICD-10-PCS | Mod: PBBFAC,HCNC,, | Performed by: DIETITIAN, REGISTERED

## 2021-11-08 PROCEDURE — 99999 PR PBB SHADOW E&M-EST. PATIENT-LVL II: CPT | Mod: PBBFAC,HCNC,, | Performed by: DIETITIAN, REGISTERED

## 2021-11-08 PROCEDURE — G0108 DIAB MANAGE TRN  PER INDIV: HCPCS | Mod: HCNC,S$GLB,, | Performed by: DIETITIAN, REGISTERED

## 2021-11-08 PROCEDURE — G0108 PR DIAB MANAGE TRN  PER INDIV: ICD-10-PCS | Mod: HCNC,S$GLB,, | Performed by: DIETITIAN, REGISTERED

## 2021-11-10 DIAGNOSIS — I10 ESSENTIAL HYPERTENSION: ICD-10-CM

## 2021-11-10 DIAGNOSIS — E11.65 UNCONTROLLED TYPE 2 DIABETES MELLITUS WITH HYPERGLYCEMIA: Primary | ICD-10-CM

## 2021-11-11 RX ORDER — LOSARTAN POTASSIUM 25 MG/1
TABLET ORAL
Qty: 90 TABLET | Refills: 0 | Status: SHIPPED | OUTPATIENT
Start: 2021-11-11 | End: 2022-01-28 | Stop reason: SDUPTHER

## 2021-11-29 DIAGNOSIS — E78.5 HYPERLIPIDEMIA WITH TARGET LDL LESS THAN 100: ICD-10-CM

## 2021-11-29 DIAGNOSIS — Z23 NEED FOR VACCINATION: Primary | ICD-10-CM

## 2021-11-29 DIAGNOSIS — I10 ESSENTIAL HYPERTENSION: ICD-10-CM

## 2021-11-29 RX ORDER — HYDROCHLOROTHIAZIDE 25 MG/1
25 TABLET ORAL DAILY
Qty: 90 TABLET | Refills: 0 | Status: SHIPPED | OUTPATIENT
Start: 2021-11-29 | End: 2022-01-28 | Stop reason: SDUPTHER

## 2021-11-29 RX ORDER — METFORMIN HYDROCHLORIDE 500 MG/1
TABLET, EXTENDED RELEASE ORAL
Qty: 360 TABLET | Refills: 0 | Status: SHIPPED | OUTPATIENT
Start: 2021-11-29 | End: 2022-01-28 | Stop reason: SDUPTHER

## 2021-11-29 RX ORDER — LOVASTATIN 20 MG/1
20 TABLET ORAL DAILY
Qty: 90 TABLET | Refills: 0 | Status: SHIPPED | OUTPATIENT
Start: 2021-11-29 | End: 2022-01-28 | Stop reason: SDUPTHER

## 2021-11-29 RX ORDER — INSULIN PUMP SYRINGE, 3 ML
EACH MISCELLANEOUS
Qty: 1 EACH | Refills: 0 | Status: SHIPPED | OUTPATIENT
Start: 2021-11-29 | End: 2022-03-22

## 2021-11-29 RX ORDER — GLIPIZIDE 10 MG/1
10 TABLET, FILM COATED, EXTENDED RELEASE ORAL 2 TIMES DAILY
Qty: 180 TABLET | Refills: 0 | Status: SHIPPED | OUTPATIENT
Start: 2021-11-29 | End: 2022-01-28 | Stop reason: SDUPTHER

## 2021-11-30 ENCOUNTER — TELEPHONE (OUTPATIENT)
Dept: FAMILY MEDICINE | Facility: CLINIC | Age: 66
End: 2021-11-30
Payer: MEDICARE

## 2021-12-02 ENCOUNTER — CLINICAL SUPPORT (OUTPATIENT)
Dept: FAMILY MEDICINE | Facility: CLINIC | Age: 66
End: 2021-12-02
Payer: MEDICARE

## 2021-12-02 VITALS — TEMPERATURE: 99 F

## 2021-12-02 DIAGNOSIS — Z23 NEED FOR IMMUNIZATION AGAINST INFLUENZA: Primary | ICD-10-CM

## 2021-12-02 PROCEDURE — 90694 VACC AIIV4 NO PRSRV 0.5ML IM: CPT | Mod: HCNC,S$GLB,, | Performed by: INTERNAL MEDICINE

## 2021-12-02 PROCEDURE — G0008 FLU VACCINE - QUADRIVALENT - ADJUVANTED: ICD-10-PCS | Mod: HCNC,S$GLB,, | Performed by: INTERNAL MEDICINE

## 2021-12-02 PROCEDURE — 99499 NO LOS: ICD-10-PCS | Mod: HCNC,S$GLB,, | Performed by: INTERNAL MEDICINE

## 2021-12-02 PROCEDURE — G0008 ADMIN INFLUENZA VIRUS VAC: HCPCS | Mod: HCNC,S$GLB,, | Performed by: INTERNAL MEDICINE

## 2021-12-02 PROCEDURE — 90694 FLU VACCINE - QUADRIVALENT - ADJUVANTED: ICD-10-PCS | Mod: HCNC,S$GLB,, | Performed by: INTERNAL MEDICINE

## 2021-12-02 PROCEDURE — 99499 UNLISTED E&M SERVICE: CPT | Mod: HCNC,S$GLB,, | Performed by: INTERNAL MEDICINE

## 2021-12-21 RX ORDER — LINAGLIPTIN 5 MG/1
TABLET, FILM COATED ORAL
Qty: 90 TABLET | Refills: 0 | Status: SHIPPED | OUTPATIENT
Start: 2021-12-21 | End: 2022-01-28 | Stop reason: SDUPTHER

## 2022-01-24 DIAGNOSIS — I10 ESSENTIAL HYPERTENSION: ICD-10-CM

## 2022-01-24 NOTE — TELEPHONE ENCOUNTER
Care Due:                  Date            Visit Type   Department     Provider  --------------------------------------------------------------------------------                                             [facility]/ INTERNAL  Laurentia Kay  Last Visit: 07-      None         MED/ PEDS      Winkeler  Lynn                                           [facility]/ INTERNAL  Laurentia Kay  Next Visit: 01-      None         MED/ PEDS      Winkeler  Lynn                                                            Last  Test          Frequency    Reason                     Performed    Due Date  --------------------------------------------------------------------------------    HBA1C.......  6 months...  TRADJENTA, glipiZIDE,      10-   04-                             metFORMIN................    Powered by [employer]. Reference number: [id number].   1/24/2022 3:09:54 PM CST

## 2022-01-25 ENCOUNTER — LAB VISIT (OUTPATIENT)
Dept: LAB | Facility: HOSPITAL | Age: 67
End: 2022-01-25
Attending: INTERNAL MEDICINE
Payer: MEDICARE

## 2022-01-25 DIAGNOSIS — E11.65 UNCONTROLLED TYPE 2 DIABETES MELLITUS WITH HYPERGLYCEMIA: ICD-10-CM

## 2022-01-25 LAB
ESTIMATED AVG GLUCOSE: 171 MG/DL (ref 68–131)
HBA1C MFR BLD: 7.6 % (ref 4–5.6)

## 2022-01-25 PROCEDURE — 83036 HEMOGLOBIN GLYCOSYLATED A1C: CPT | Mod: HCNC | Performed by: INTERNAL MEDICINE

## 2022-01-25 PROCEDURE — 36415 COLL VENOUS BLD VENIPUNCTURE: CPT | Mod: HCNC,PO | Performed by: INTERNAL MEDICINE

## 2022-01-28 ENCOUNTER — OFFICE VISIT (OUTPATIENT)
Dept: FAMILY MEDICINE | Facility: CLINIC | Age: 67
End: 2022-01-28
Payer: MEDICARE

## 2022-01-28 VITALS
HEIGHT: 66 IN | TEMPERATURE: 98 F | OXYGEN SATURATION: 97 % | SYSTOLIC BLOOD PRESSURE: 112 MMHG | WEIGHT: 224 LBS | BODY MASS INDEX: 36 KG/M2 | DIASTOLIC BLOOD PRESSURE: 58 MMHG | HEART RATE: 73 BPM

## 2022-01-28 DIAGNOSIS — M85.80 OSTEOPENIA AFTER MENOPAUSE: ICD-10-CM

## 2022-01-28 DIAGNOSIS — Z00.00 ROUTINE MEDICAL EXAM: Primary | ICD-10-CM

## 2022-01-28 DIAGNOSIS — E11.65 UNCONTROLLED TYPE 2 DIABETES MELLITUS WITH HYPERGLYCEMIA: ICD-10-CM

## 2022-01-28 DIAGNOSIS — E55.9 VITAMIN D DEFICIENCY DISEASE: ICD-10-CM

## 2022-01-28 DIAGNOSIS — E66.01 SEVERE OBESITY (BMI 35.0-35.9 WITH COMORBIDITY): ICD-10-CM

## 2022-01-28 DIAGNOSIS — D57.3 SICKLE CELL TRAIT: ICD-10-CM

## 2022-01-28 DIAGNOSIS — E89.0 HYPOTHYROIDISM, POSTSURGICAL: ICD-10-CM

## 2022-01-28 DIAGNOSIS — Z78.0 MENOPAUSE: ICD-10-CM

## 2022-01-28 DIAGNOSIS — I10 ESSENTIAL HYPERTENSION: ICD-10-CM

## 2022-01-28 DIAGNOSIS — Z23 NEED FOR SHINGLES VACCINE: ICD-10-CM

## 2022-01-28 DIAGNOSIS — Z78.0 OSTEOPENIA AFTER MENOPAUSE: ICD-10-CM

## 2022-01-28 DIAGNOSIS — Z12.11 COLON CANCER SCREENING: ICD-10-CM

## 2022-01-28 DIAGNOSIS — E78.5 HYPERLIPIDEMIA WITH TARGET LDL LESS THAN 100: ICD-10-CM

## 2022-01-28 PROCEDURE — 1101F PT FALLS ASSESS-DOCD LE1/YR: CPT | Mod: HCNC,CPTII,S$GLB, | Performed by: INTERNAL MEDICINE

## 2022-01-28 PROCEDURE — 1159F PR MEDICATION LIST DOCUMENTED IN MEDICAL RECORD: ICD-10-PCS | Mod: HCNC,CPTII,S$GLB, | Performed by: INTERNAL MEDICINE

## 2022-01-28 PROCEDURE — 3074F SYST BP LT 130 MM HG: CPT | Mod: HCNC,CPTII,S$GLB, | Performed by: INTERNAL MEDICINE

## 2022-01-28 PROCEDURE — 3078F PR MOST RECENT DIASTOLIC BLOOD PRESSURE < 80 MM HG: ICD-10-PCS | Mod: HCNC,CPTII,S$GLB, | Performed by: INTERNAL MEDICINE

## 2022-01-28 PROCEDURE — 1101F PR PT FALLS ASSESS DOC 0-1 FALLS W/OUT INJ PAST YR: ICD-10-PCS | Mod: HCNC,CPTII,S$GLB, | Performed by: INTERNAL MEDICINE

## 2022-01-28 PROCEDURE — 3078F DIAST BP <80 MM HG: CPT | Mod: HCNC,CPTII,S$GLB, | Performed by: INTERNAL MEDICINE

## 2022-01-28 PROCEDURE — 99499 UNLISTED E&M SERVICE: CPT | Mod: S$GLB,,, | Performed by: INTERNAL MEDICINE

## 2022-01-28 PROCEDURE — 3288F PR FALLS RISK ASSESSMENT DOCUMENTED: ICD-10-PCS | Mod: HCNC,CPTII,S$GLB, | Performed by: INTERNAL MEDICINE

## 2022-01-28 PROCEDURE — 1126F PR PAIN SEVERITY QUANTIFIED, NO PAIN PRESENT: ICD-10-PCS | Mod: HCNC,CPTII,S$GLB, | Performed by: INTERNAL MEDICINE

## 2022-01-28 PROCEDURE — 3051F PR MOST RECENT HEMOGLOBIN A1C LEVEL 7.0 - < 8.0%: ICD-10-PCS | Mod: HCNC,CPTII,S$GLB, | Performed by: INTERNAL MEDICINE

## 2022-01-28 PROCEDURE — 1160F RVW MEDS BY RX/DR IN RCRD: CPT | Mod: HCNC,CPTII,S$GLB, | Performed by: INTERNAL MEDICINE

## 2022-01-28 PROCEDURE — 99397 PER PM REEVAL EST PAT 65+ YR: CPT | Mod: HCNC,S$GLB,, | Performed by: INTERNAL MEDICINE

## 2022-01-28 PROCEDURE — 99999 PR PBB SHADOW E&M-EST. PATIENT-LVL V: ICD-10-PCS | Mod: PBBFAC,HCNC,, | Performed by: INTERNAL MEDICINE

## 2022-01-28 PROCEDURE — 4010F PR ACE/ARB THEARPY RXD/TAKEN: ICD-10-PCS | Mod: HCNC,CPTII,S$GLB, | Performed by: INTERNAL MEDICINE

## 2022-01-28 PROCEDURE — 3051F HG A1C>EQUAL 7.0%<8.0%: CPT | Mod: HCNC,CPTII,S$GLB, | Performed by: INTERNAL MEDICINE

## 2022-01-28 PROCEDURE — 99999 PR PBB SHADOW E&M-EST. PATIENT-LVL V: CPT | Mod: PBBFAC,HCNC,, | Performed by: INTERNAL MEDICINE

## 2022-01-28 PROCEDURE — 1159F MED LIST DOCD IN RCRD: CPT | Mod: HCNC,CPTII,S$GLB, | Performed by: INTERNAL MEDICINE

## 2022-01-28 PROCEDURE — 3074F PR MOST RECENT SYSTOLIC BLOOD PRESSURE < 130 MM HG: ICD-10-PCS | Mod: HCNC,CPTII,S$GLB, | Performed by: INTERNAL MEDICINE

## 2022-01-28 PROCEDURE — 3008F PR BODY MASS INDEX (BMI) DOCUMENTED: ICD-10-PCS | Mod: HCNC,CPTII,S$GLB, | Performed by: INTERNAL MEDICINE

## 2022-01-28 PROCEDURE — 3008F BODY MASS INDEX DOCD: CPT | Mod: HCNC,CPTII,S$GLB, | Performed by: INTERNAL MEDICINE

## 2022-01-28 PROCEDURE — 1160F PR REVIEW ALL MEDS BY PRESCRIBER/CLIN PHARMACIST DOCUMENTED: ICD-10-PCS | Mod: HCNC,CPTII,S$GLB, | Performed by: INTERNAL MEDICINE

## 2022-01-28 PROCEDURE — 1126F AMNT PAIN NOTED NONE PRSNT: CPT | Mod: HCNC,CPTII,S$GLB, | Performed by: INTERNAL MEDICINE

## 2022-01-28 PROCEDURE — 99397 PR PREVENTIVE VISIT,EST,65 & OVER: ICD-10-PCS | Mod: HCNC,S$GLB,, | Performed by: INTERNAL MEDICINE

## 2022-01-28 PROCEDURE — 3288F FALL RISK ASSESSMENT DOCD: CPT | Mod: HCNC,CPTII,S$GLB, | Performed by: INTERNAL MEDICINE

## 2022-01-28 PROCEDURE — 4010F ACE/ARB THERAPY RXD/TAKEN: CPT | Mod: HCNC,CPTII,S$GLB, | Performed by: INTERNAL MEDICINE

## 2022-01-28 PROCEDURE — 99499 RISK ADDL DX/OHS AUDIT: ICD-10-PCS | Mod: S$GLB,,, | Performed by: INTERNAL MEDICINE

## 2022-01-28 RX ORDER — HYDROCHLOROTHIAZIDE 25 MG/1
25 TABLET ORAL DAILY
Qty: 90 TABLET | Refills: 1 | Status: SHIPPED | OUTPATIENT
Start: 2022-01-28 | End: 2022-08-23

## 2022-01-28 RX ORDER — LOVASTATIN 20 MG/1
20 TABLET ORAL DAILY
Qty: 90 TABLET | Refills: 1 | Status: SHIPPED | OUTPATIENT
Start: 2022-01-28 | End: 2022-07-07

## 2022-01-28 RX ORDER — GLIPIZIDE 10 MG/1
10 TABLET, FILM COATED, EXTENDED RELEASE ORAL 2 TIMES DAILY
Qty: 180 TABLET | Refills: 1 | Status: SHIPPED | OUTPATIENT
Start: 2022-01-28 | End: 2022-07-06

## 2022-01-28 RX ORDER — LEVOTHYROXINE SODIUM 150 UG/1
150 TABLET ORAL
Qty: 90 TABLET | Refills: 1 | Status: SHIPPED | OUTPATIENT
Start: 2022-01-28 | End: 2022-10-05

## 2022-01-28 RX ORDER — METFORMIN HYDROCHLORIDE 500 MG/1
TABLET, EXTENDED RELEASE ORAL
Qty: 360 TABLET | Refills: 1 | Status: SHIPPED | OUTPATIENT
Start: 2022-01-28 | End: 2022-08-16

## 2022-01-28 RX ORDER — LOSARTAN POTASSIUM 25 MG/1
25 TABLET ORAL DAILY
Qty: 90 TABLET | Refills: 1 | Status: SHIPPED | OUTPATIENT
Start: 2022-01-28 | End: 2022-06-16

## 2022-01-28 RX ORDER — LINAGLIPTIN 5 MG/1
5 TABLET, FILM COATED ORAL DAILY
Qty: 90 TABLET | Refills: 1 | Status: SHIPPED | OUTPATIENT
Start: 2022-01-28 | End: 2022-03-30

## 2022-01-28 NOTE — PROGRESS NOTES
HISTORY OF PRESENT ILLNESS:  Masha Garcia is a 66 y.o. female who presents to the clinic today for a routine physical exam. Her last physical exam was approximately 1 years(s) ago.        PAST MEDICAL HISTORY:  Past Medical History:   Diagnosis Date    AR (allergic rhinitis)     Breast cancer 06/2013    right; followed by oncology, Dr. Oliveira; s/p lumpectomy and  radiation    History of colonic polyps     Hyperlipidemia LDL goal < 100     Hypertension     Lower extremity edema     Mild chronic anemia     Obesity     Osteopenia     Sickle cell trait     Thyroid nodule     Type II or unspecified type diabetes mellitus without mention of complication, uncontrolled     Vitamin D deficiency disease        PAST SURGICAL HISTORY:  Past Surgical History:   Procedure Laterality Date    benign right breast biopsy  2012    lumpectomy right breast  6/2013    right ankle fracture repair      TOTAL ABDOMINAL HYSTERECTOMY      TOTAL THYROIDECTOMY  12/28/2016       SOCIAL HISTORY:  Social History     Socioeconomic History    Marital status:     Number of children: 3   Occupational History    Occupation: retired   Tobacco Use    Smoking status: Never Smoker    Smokeless tobacco: Never Used   Substance and Sexual Activity    Alcohol use: No       FAMILY HISTORY:  Family History   Problem Relation Age of Onset    Diabetes Mother     Hypertension Mother     Hypertension Father     Prostate cancer Father     Diabetes Brother     Cancer Neg Hx     Breast cancer Neg Hx     Colon cancer Neg Hx     Ovarian cancer Neg Hx     Amblyopia Neg Hx     Blindness Neg Hx     Cataracts Neg Hx     Glaucoma Neg Hx     Macular degeneration Neg Hx     Retinal detachment Neg Hx     Strabismus Neg Hx     Stroke Neg Hx     Thyroid disease Neg Hx        ALLERGIES AND MEDICATIONS: updated and reviewed.  Review of patient's allergies indicates:   Allergen Reactions    Fosamax [alendronate] Other (See  Comments)     - 'funny feeling'    Ace inhibitors Other (See Comments)     cough    Penicillins Hives     Medication List with Changes/Refills   Current Medications    BLOOD SUGAR DIAGNOSTIC (TRUE METRIX GLUCOSE TEST STRIP) STRP    Use to test blood sugar bid and prn.    BLOOD-GLUCOSE METER (TRUE METRIX GLUCOSE METER) KIT    Use to test blood sugar bid and prn.    CALCIUM CARBONATE-VITAMIN D3 600 MG-5 MCG (200 UNIT) CAP    Take 1 tablet by mouth every morning.     FLASH GLUCOSE SCANNING READER (FREESTYLE LESLIE 10 DAY READER) MISC    Use as directed to check blood sugar tid and prn.    FLASH GLUCOSE SENSOR (FREESTYLE LESLIE 10 DAY SENSOR) KIT    1 Units by Misc.(Non-Drug; Combo Route) route 3 (three) times daily with meals.    LORATADINE (CLARITIN) 10 MG TABLET    Take 10 mg by mouth daily as needed.    Changed and/or Refilled Medications    Modified Medication Previous Medication    GLIPIZIDE (GLUCOTROL) 10 MG TR24 glipiZIDE (GLUCOTROL) 10 MG TR24       Take 1 tablet (10 mg total) by mouth 2 (two) times daily.    Take 1 tablet (10 mg total) by mouth 2 (two) times daily.    HYDROCHLOROTHIAZIDE (HYDRODIURIL) 25 MG TABLET hydroCHLOROthiazide (HYDRODIURIL) 25 MG tablet       Take 1 tablet (25 mg total) by mouth once daily.    Take 1 tablet (25 mg total) by mouth once daily.    LEVOTHYROXINE (EUTHYROX) 150 MCG TABLET EUTHYROX 150 mcg tablet       Take 1 tablet (150 mcg total) by mouth before breakfast.    TAKE 1 TABLET BY MOUTH BEFORE BREAKFAST    LINAGLIPTIN (TRADJENTA) 5 MG TAB TABLET TRADJENTA 5 mg Tab tablet       Take 1 tablet (5 mg total) by mouth once daily.    Take 1 tablet by mouth once daily    LOSARTAN (COZAAR) 25 MG TABLET losartan (COZAAR) 25 MG tablet       Take 1 tablet (25 mg total) by mouth once daily.    TAKE 1 TABLET EVERY DAY    LOVASTATIN (MEVACOR) 20 MG TABLET lovastatin (MEVACOR) 20 MG tablet       Take 1 tablet (20 mg total) by mouth once daily.    Take 1 tablet (20 mg total) by mouth once daily.     METFORMIN (GLUCOPHAGE-XR) 500 MG ER 24HR TABLET metFORMIN (GLUCOPHAGE-XR) 500 MG ER 24hr tablet       TAKE 2 TABLETS TWICE DAILY WITH MEALS    TAKE 2 TABLETS TWICE DAILY WITH MEALS          CARE TEAM:  Patient Care Team:  Brenda Bailey MD as PCP - General (Internal Medicine)  Eun Frederick MD as Consulting Physician (Oncology)  Elsy Dubose MD as Consulting Physician (Endocrinology)  Angeles Cao LPN as Licensed Practical Nurse  Bobby Vallejo MD as Consulting Physician (Gastroenterology)           SCREENING HISTORY:  Health Maintenance       Date Due Completion Date    Shingles Vaccine (1 of 2) Never done ---    TETANUS VACCINE 07/24/2019 7/24/2009    Eye Exam 12/15/2021 12/15/2020    Override on 12/9/2014: Done    Override on 11/1/2010: Done    Foot Exam 01/04/2022 1/4/2021    Override on 2/4/2020: Done (skin dryness to bottom of both feet)    Override on 9/28/2016: Done (normal see note in visit)    DEXA SCAN 02/12/2022 2/12/2020    Hemoglobin A1c 04/25/2022 1/25/2022    Mammogram 07/07/2022 7/7/2021    Override on 6/19/2018: Done (imageing center)    Override on 6/15/2016: Done (done at [facility] normal scanned report to chart)    Override on 3/18/2015: Done    Override on 3/1/2014: Done ([facility])    Override on 4/1/2013: Done    Override on 2/1/2012: Done    Diabetes Urine Screening 07/10/2022 7/10/2021    Lipid Panel 07/10/2022 7/10/2021    Pneumococcal Vaccines (Age 65+) (4 of 4 - PPSV23) 12/06/2022 12/6/2017    Low Dose Statin 01/28/2023 1/28/2022    Colorectal Cancer Screening 07/02/2024 7/2/2019            REVIEW OF SYSTEMS:   The patient reports: they are improving their dietary habits - she is decreasing overall calorie and sugar intake.  The patient reports : that they exercise occasionally  Review of Systems   Constitutional: Negative for chills, fatigue, fever and unexpected weight change.   HENT: Negative for congestion and postnasal drip.    Eyes:  "Negative for pain and visual disturbance.   Respiratory: Negative for cough, shortness of breath and wheezing.    Cardiovascular: Negative for chest pain, palpitations and leg swelling.   Gastrointestinal: Positive for diarrhea (- especially in the morning around breakfast time). Negative for abdominal pain, constipation, nausea and vomiting.   Genitourinary: Negative for dysuria.   Musculoskeletal: Negative for arthralgias and back pain.   Skin: Negative for rash.   Neurological: Negative for weakness and headaches.   Psychiatric/Behavioral: Negative for dysphoric mood and sleep disturbance. The patient is not nervous/anxious.       ROS (Optional)-: no pelvic pain  Breast ROS (Optional)-: negative for breast lumps/discharge            Physical Examination:   Vitals:    01/28/22 1352   BP: (!) 112/58   Pulse: 73   Temp: 98.4 °F (36.9 °C)     Weight: 101.6 kg (223 lb 15.8 oz)   Height: 5' 6" (167.6 cm)   Body mass index is 36.15 kg/m².      Patient did not require to have a chaperone present during the exam today.    General appearance - alert, well appearing, and in no distress, obese  Psychiatric - alert, oriented to person, place, and time, normal behavior, speech, dress, motor activity and thought process  Eyes - pupils equal and reactive, extraocular eye movements intact, sclera anicteric  Mouth - not examined; patient wearing mask due to Covid 19 pandemic  Neck - supple, no significant adenopathy, carotids upstroke normal bilaterally, no bruits  Lymphatics - no palpable cervical lymphadenopathy  Chest - clear to auscultation, no wheezes, rales or rhonchi, symmetric air entry  Heart - normal rate and regular rhythm, no gallops noted  Neurological - alert, normal speech, no focal findings or movement disorder noted, cranial nerves II through XII intact  Musculoskeletal - patient noted to have Mild-Moderate osteoarthritic changes to both knee joints. No joint effusions noted., no muscular tenderness " noted  Extremities - no pedal edema noted  Skin - normal coloration, no suspicious skin lesions  Diabetic Foot Exam                                                                                                                                                                                                                      Protective Sensation (w/ 10 gram monofilament):  Right: Intact  Left: Intact    Visual Inspection:  Normal -  Bilateral    Pedal Pulses:   Right: Present  Left: Present    Posterior tibialis:   Right:Present  Left: Present         Labs:  Lab Results   Component Value Date    HGBA1C 7.6 (H) 01/25/2022    HGBA1C 9.5 (H) 10/11/2021    HGBA1C 8.4 (H) 07/10/2021      Lab Results   Component Value Date    CHOL 138 07/10/2021    CHOL 138 07/10/2021    CHOL 143 06/16/2020     Lab Results   Component Value Date    LDLCALC 65.0 07/10/2021    LDLCALC 65.0 07/10/2021    LDLCALC 63.2 06/16/2020         ASSESSMENT AND PLAN:  1. Routine medical exam  Counseled on age appropriate medical preventative services including age appropriate cancer screenings, age appropriate eye and dental exams, over all nutritional health, need for a consistent exercise regimen, and an over all push towards maintaining a vigorous and active lifestyle.  Counseled on age appropriate vaccines and discussed upcoming health care needs based on age/gender. Discussed good sleep hygiene and stress management.    2. Uncontrolled type 2 diabetes mellitus with hyperglycemia  Diabetes is under fair control at this time (due to hyperglycemia) for age and comorbid conditions. She has made significant improvements and will continue to work on diet/weight loss.  We discussed diabetic diet and regular exercise.   We discussed home blood sugar monitoring, if appropriate - the patient should test twice daily and as needed.   Continue current medication regimen.  Diabetic complications addressed: Not applicable.  Patient was counseled on the need  for yearly eye exam to screen for/monitor diabetic retinopathy and yearly diabetic foot exam.  - metFORMIN (GLUCOPHAGE-XR) 500 MG ER 24hr tablet; TAKE 2 TABLETS TWICE DAILY WITH MEALS  Dispense: 360 tablet; Refill: 1  - glipiZIDE (GLUCOTROL) 10 MG TR24; Take 1 tablet (10 mg total) by mouth 2 (two) times daily.  Dispense: 180 tablet; Refill: 1  - linaGLIPtin (TRADJENTA) 5 mg Tab tablet; Take 1 tablet (5 mg total) by mouth once daily.  Dispense: 90 tablet; Refill: 1    3. Essential hypertension  The current medical regimen is effective;  continue present plan and medications. Recommended patient to check home readings to monitor and see me for followup as scheduled or sooner as needed.   Discussed sodium restriction, maintaining ideal body weight and regular exercise program as physiologic means to continue to achieve blood pressure control in addition to medication compliance.  Patient was educated that both decongestant and anti-inflammatory medication may raise blood pressure.  The patient is not active on the digital hypertension program.  - losartan (COZAAR) 25 MG tablet; Take 1 tablet (25 mg total) by mouth once daily.  Dispense: 90 tablet; Refill: 1  - hydroCHLOROthiazide (HYDRODIURIL) 25 MG tablet; Take 1 tablet (25 mg total) by mouth once daily.  Dispense: 90 tablet; Refill: 1    4. Hyperlipidemia with target LDL less than 100  We discussed low fat diet and regular exercise.The current medical regimen is effective;  continue present plan and medications.   - lovastatin (MEVACOR) 20 MG tablet; Take 1 tablet (20 mg total) by mouth once daily.  Dispense: 90 tablet; Refill: 1    5. Sickle cell trait  Stable. Asymptomatic. Observe.    6. Hypothyroidism, postsurgical  Patient is clinically euthyroid. Continue current regimen.  - levothyroxine (EUTHYROX) 150 MCG tablet; Take 1 tablet (150 mcg total) by mouth before breakfast.  Dispense: 90 tablet; Refill: 1    7. Osteopenia after menopause/8. Vitamin D deficiency  disease  We discussed adequate calcium and vitamin D supplementation. We discussed fall precautions. She is scheduled for her BMD. No need for prescription medication at this time. Further treatment plan will be based on results of bone density testing.    9. Severe obesity (BMI 35.0-35.9 with comorbidity)  The patient is asked to continue to make an attempt to improve diet and exercise patterns to aid in medical management of this problem.     10. Colon cancer screening  Chart updated with next due date.    11. Need for shingles vaccine  Patient was advised to get immunization at the pharmacy.    12. Menopause    - DXA Bone Density Spine And Hip; Future          Orders Placed This Encounter   Procedures    DXA Bone Density Spine And Hip      Follow up in about 6 months (around 7/28/2022), or if symptoms worsen or fail to improve, for follow up chronic medical conditions.. or sooner as needed.

## 2022-02-02 ENCOUNTER — LAB VISIT (OUTPATIENT)
Dept: LAB | Facility: HOSPITAL | Age: 67
End: 2022-02-02
Payer: MEDICARE

## 2022-02-02 DIAGNOSIS — Z17.0 MALIGNANT NEOPLASM OF RIGHT BREAST IN FEMALE, ESTROGEN RECEPTOR POSITIVE, UNSPECIFIED SITE OF BREAST: ICD-10-CM

## 2022-02-02 DIAGNOSIS — D64.9 ANEMIA, UNSPECIFIED TYPE: ICD-10-CM

## 2022-02-02 DIAGNOSIS — C50.911 MALIGNANT NEOPLASM OF RIGHT BREAST IN FEMALE, ESTROGEN RECEPTOR POSITIVE, UNSPECIFIED SITE OF BREAST: ICD-10-CM

## 2022-02-02 LAB
ALBUMIN SERPL BCP-MCNC: 3.3 G/DL (ref 3.5–5.2)
ALP SERPL-CCNC: 64 U/L (ref 55–135)
ALT SERPL W/O P-5'-P-CCNC: 16 U/L (ref 10–44)
ANION GAP SERPL CALC-SCNC: 12 MMOL/L (ref 8–16)
AST SERPL-CCNC: 18 U/L (ref 10–40)
BASOPHILS # BLD AUTO: 0.06 K/UL (ref 0–0.2)
BASOPHILS NFR BLD: 0.6 % (ref 0–1.9)
BILIRUB SERPL-MCNC: 0.6 MG/DL (ref 0.1–1)
BUN SERPL-MCNC: 14 MG/DL (ref 8–23)
CALCIUM SERPL-MCNC: 10.5 MG/DL (ref 8.7–10.5)
CHLORIDE SERPL-SCNC: 103 MMOL/L (ref 95–110)
CO2 SERPL-SCNC: 24 MMOL/L (ref 23–29)
CREAT SERPL-MCNC: 0.8 MG/DL (ref 0.5–1.4)
DIFFERENTIAL METHOD: ABNORMAL
EOSINOPHIL # BLD AUTO: 0.1 K/UL (ref 0–0.5)
EOSINOPHIL NFR BLD: 0.8 % (ref 0–8)
ERYTHROCYTE [DISTWIDTH] IN BLOOD BY AUTOMATED COUNT: 14.3 % (ref 11.5–14.5)
EST. GFR  (AFRICAN AMERICAN): >60 ML/MIN/1.73 M^2
EST. GFR  (NON AFRICAN AMERICAN): >60 ML/MIN/1.73 M^2
FERRITIN SERPL-MCNC: 124 NG/ML (ref 20–300)
GLUCOSE SERPL-MCNC: 174 MG/DL (ref 70–110)
HCT VFR BLD AUTO: 37.1 % (ref 37–48.5)
HGB BLD-MCNC: 12.2 G/DL (ref 12–16)
IMM GRANULOCYTES # BLD AUTO: 0.06 K/UL (ref 0–0.04)
IMM GRANULOCYTES NFR BLD AUTO: 0.6 % (ref 0–0.5)
IRON SERPL-MCNC: 73 UG/DL (ref 30–160)
LYMPHOCYTES # BLD AUTO: 3.1 K/UL (ref 1–4.8)
LYMPHOCYTES NFR BLD: 28.5 % (ref 18–48)
MCH RBC QN AUTO: 28.3 PG (ref 27–31)
MCHC RBC AUTO-ENTMCNC: 32.9 G/DL (ref 32–36)
MCV RBC AUTO: 86 FL (ref 82–98)
MONOCYTES # BLD AUTO: 0.4 K/UL (ref 0.3–1)
MONOCYTES NFR BLD: 4 % (ref 4–15)
NEUTROPHILS # BLD AUTO: 7 K/UL (ref 1.8–7.7)
NEUTROPHILS NFR BLD: 65.5 % (ref 38–73)
NRBC BLD-RTO: 0 /100 WBC
PLATELET # BLD AUTO: 264 K/UL (ref 150–450)
PMV BLD AUTO: 12.2 FL (ref 9.2–12.9)
POTASSIUM SERPL-SCNC: 4 MMOL/L (ref 3.5–5.1)
PROT SERPL-MCNC: 7.5 G/DL (ref 6–8.4)
RBC # BLD AUTO: 4.31 M/UL (ref 4–5.4)
SATURATED IRON: 23 % (ref 20–50)
SODIUM SERPL-SCNC: 139 MMOL/L (ref 136–145)
TOTAL IRON BINDING CAPACITY: 318 UG/DL (ref 250–450)
TRANSFERRIN SERPL-MCNC: 215 MG/DL (ref 200–375)
WBC # BLD AUTO: 10.71 K/UL (ref 3.9–12.7)

## 2022-02-02 PROCEDURE — 85025 COMPLETE CBC W/AUTO DIFF WBC: CPT | Mod: HCNC | Performed by: INTERNAL MEDICINE

## 2022-02-02 PROCEDURE — 84466 ASSAY OF TRANSFERRIN: CPT | Mod: HCNC | Performed by: INTERNAL MEDICINE

## 2022-02-02 PROCEDURE — 80053 COMPREHEN METABOLIC PANEL: CPT | Mod: HCNC | Performed by: INTERNAL MEDICINE

## 2022-02-02 PROCEDURE — 82728 ASSAY OF FERRITIN: CPT | Mod: HCNC | Performed by: INTERNAL MEDICINE

## 2022-02-02 PROCEDURE — 36415 COLL VENOUS BLD VENIPUNCTURE: CPT | Mod: HCNC,PO | Performed by: INTERNAL MEDICINE

## 2022-02-09 ENCOUNTER — TELEPHONE (OUTPATIENT)
Dept: HEMATOLOGY/ONCOLOGY | Facility: CLINIC | Age: 67
End: 2022-02-09
Payer: MEDICARE

## 2022-02-09 RX ORDER — LOSARTAN POTASSIUM 25 MG/1
TABLET ORAL
Qty: 90 TABLET | Refills: 0 | OUTPATIENT
Start: 2022-02-09

## 2022-02-09 NOTE — TELEPHONE ENCOUNTER
Quick DC. Request already responded to by other means (e.g. phone or fax)   This medication has been reordered already and receipt confirmed by pharmacy.  Will remove duplicate and send Care Due Message.

## 2022-02-09 NOTE — TELEPHONE ENCOUNTER
losartan (COZAAR) 25 MG tablet 90 tablet 1 1/28/2022  No   Sig - Route: Take 1 tablet (25 mg total) by mouth once daily. - Oral   Sent to pharmacy as: losartan (COZAAR) 25 MG tablet   Class: Normal   Notes to Pharmacy: .   Order: [id number]   Date/Time Signed: 1/28/2022 14:24       E-Prescribing Status: Receipt confirmed by pharmacy (1/28/2022  2:24 PM CST        Duplicate order not needed, already sent to pharmacy on 01/28/22.

## 2022-02-09 NOTE — TELEPHONE ENCOUNTER
Provider Staff:     Action is required for this patient.   Please see care gap opportunities below in Care Due Message.     Thanks!  [facility]     Appointments      Date Provider   Last Visit   7/14/2021 Brenda Bailey MD   Next Visit   1/28/2022 Brenda Bailey MD     Note composed:11:13 AM 02/09/2022

## 2022-03-14 NOTE — TELEPHONE ENCOUNTER
No new care gaps identified.  Powered by [employer]. Reference number: [id number].   3/14/2022 2:05:00 PM CDT

## 2022-03-22 RX ORDER — INSULIN PUMP SYRINGE, 3 ML
EACH MISCELLANEOUS
Qty: 1 EACH | Refills: 0 | Status: SHIPPED | OUTPATIENT
Start: 2022-03-22 | End: 2022-10-07

## 2022-03-22 NOTE — TELEPHONE ENCOUNTER
Refill Authorization Note   Masha Garcia  is requesting a refill authorization.  Brief Assessment and Rationale for Refill:  Approve     Medication Therapy Plan:       Medication Reconciliation Completed: No   Comments:   --->Care Gap information included below if applicable.   Orders Placed This Encounter    blood-glucose meter (TRUE METRIX GLUCOSE METER) kit      Requested Prescriptions   Signed Prescriptions Disp Refills    blood-glucose meter (TRUE METRIX GLUCOSE METER) kit 1 each 0     Sig: Use to test bid and prn       Endocrinology: Diabetes - Supplies Passed - 3/22/2022 10:36 AM        Passed - Patient is at least 18 years old        Passed - Valid encounter within last 15 months     Recent Visits  Date Type Provider Dept   01/28/22 Office Visit Brenda Bailey MD [facility] Family Med/ Internal Med/ Peds   07/14/21 Office Visit Brenda Bailey MD [facility] Family Med/ Internal Med/ Peds   01/04/21 Office Visit Brenda Bailey MD [facility] Family Med/ Internal Med/ Peds   Showing recent visits within past 720 days and meeting all other requirements  Future Appointments  No visits were found meeting these conditions.  Showing future appointments within next 150 days and meeting all other requirements      Future Appointments              In 1 week [facility], [facility] - Lab, [place]    In 2 weeks Ebonie Urbina MD [facility] - Hematology Oncology, [facility]    In 4 months Brenda Bailey MD [facility] Family Medicine, [place]                Passed - HBA1C within 1080 days     Lab Results   Component Value Date    HGBA1C 7.6 (H) 01/25/2022    HGBA1C 9.5 (H) 10/11/2021    HGBA1C 8.4 (H) 07/10/2021                  Appointments  past 12m or future 3m with PCP    Date Provider   Last Visit   1/28/2022 Brenda Bailey MD   Next Visit   8/8/2022 Brenda Bailey MD   ED visits in past 90 days: 0     Note composed:6:15 PM 03/22/2022

## 2022-03-29 DIAGNOSIS — E11.65 UNCONTROLLED TYPE 2 DIABETES MELLITUS WITH HYPERGLYCEMIA: ICD-10-CM

## 2022-03-29 NOTE — TELEPHONE ENCOUNTER
No new care gaps identified.  Powered by [employer]. Reference number: [id number].   3/29/2022 9:42:04 AM CDT

## 2022-03-30 RX ORDER — LINAGLIPTIN 5 MG/1
TABLET, FILM COATED ORAL
Qty: 90 TABLET | Refills: 1 | Status: SHIPPED | OUTPATIENT
Start: 2022-03-30 | End: 2022-08-29

## 2022-03-30 NOTE — TELEPHONE ENCOUNTER
Refill Authorization Note   Masha Garcia  is requesting a refill authorization.  Brief Assessment and Rationale for Refill:  Approve     Medication Therapy Plan:       Medication Reconciliation Completed: No   Comments:   --->Care Gap information included below if applicable.       Requested Prescriptions   Pending Prescriptions Disp Refills    TRADJENTA 5 mg Tab tablet [Pharmacy Med Name: Tradjenta 5 MG Oral Tablet] 90 tablet 1     Sig: Take 1 tablet by mouth once daily       Endocrinology:  Diabetes - DPP-4 Inhibitors - linagliptin Passed - 3/29/2022  9:41 AM        Passed - Patient is at least 18 years old        Passed - Valid encounter within last 15 months     Recent Visits  Date Type Provider Dept   01/28/22 Office Visit Brenda Bailey MD [facility] Family Med/ Internal Med/ Peds   07/14/21 Office Visit Brenda Bailey MD [facility] Family Med/ Internal Med/ Peds   01/04/21 Office Visit Brenda Bailey MD [facility] Family Med/ Internal Med/ Peds   Showing recent visits within past 720 days and meeting all other requirements  Future Appointments  No visits were found meeting these conditions.  Showing future appointments within next 150 days and meeting all other requirements      Future Appointments              Tomorrow LAB, [facility] - Lab, [place]    In 1 week Ebonie Urbina MD [facility] - Hematology Oncology, [facility]    In 4 months Brenda Bailey MD [facility] - Family Medicine, [place]                Passed - Matches previous order       Previous Authorizing Provider: Brenda Bailey MD (linaGLIPtin (TRADJENTA) 5 mg Tab tablet)  Previous Pharmacy: [id number] - [place], [place] - [address]            Passed - No ED/Hospital visits since last PCP visit     Last PCP Visit: 1/28/2022 Last Admission: 12/28/2016 Last ED Visit:           Passed - HBA1C within 180 days     Lab Results   Component Value Date    HGBA1C 7.6 (H) 01/25/2022    HGBA1C 9.5 (H) 10/11/2021    HGBA1C 8.4 (H)  07/10/2021                  Appointments  past 12m or future 3m with PCP    Date Provider   Last Visit   1/28/2022 Brenda Bailey MD   Next Visit   8/8/2022 Brenda Bailey MD   ED visits in past 90 days: 0     Note composed:10:46 AM 03/30/2022

## 2022-03-31 ENCOUNTER — LAB VISIT (OUTPATIENT)
Dept: LAB | Facility: HOSPITAL | Age: 67
End: 2022-03-31
Attending: INTERNAL MEDICINE
Payer: MEDICARE

## 2022-03-31 DIAGNOSIS — C50.911 MALIGNANT NEOPLASM OF RIGHT BREAST IN FEMALE, ESTROGEN RECEPTOR POSITIVE, UNSPECIFIED SITE OF BREAST: ICD-10-CM

## 2022-03-31 DIAGNOSIS — D64.9 ANEMIA, UNSPECIFIED TYPE: ICD-10-CM

## 2022-03-31 DIAGNOSIS — Z17.0 MALIGNANT NEOPLASM OF RIGHT BREAST IN FEMALE, ESTROGEN RECEPTOR POSITIVE, UNSPECIFIED SITE OF BREAST: ICD-10-CM

## 2022-03-31 LAB
ALBUMIN SERPL BCP-MCNC: 3.4 G/DL (ref 3.5–5.2)
ALP SERPL-CCNC: 58 U/L (ref 55–135)
ALT SERPL W/O P-5'-P-CCNC: 10 U/L (ref 10–44)
ANION GAP SERPL CALC-SCNC: 11 MMOL/L (ref 8–16)
AST SERPL-CCNC: 13 U/L (ref 10–40)
BASOPHILS # BLD AUTO: 0.04 K/UL (ref 0–0.2)
BASOPHILS NFR BLD: 0.4 % (ref 0–1.9)
BILIRUB SERPL-MCNC: 0.5 MG/DL (ref 0.1–1)
BUN SERPL-MCNC: 13 MG/DL (ref 8–23)
CALCIUM SERPL-MCNC: 10.7 MG/DL (ref 8.7–10.5)
CHLORIDE SERPL-SCNC: 106 MMOL/L (ref 95–110)
CO2 SERPL-SCNC: 26 MMOL/L (ref 23–29)
CREAT SERPL-MCNC: 0.9 MG/DL (ref 0.5–1.4)
DIFFERENTIAL METHOD: ABNORMAL
EOSINOPHIL # BLD AUTO: 0.1 K/UL (ref 0–0.5)
EOSINOPHIL NFR BLD: 0.8 % (ref 0–8)
ERYTHROCYTE [DISTWIDTH] IN BLOOD BY AUTOMATED COUNT: 14.3 % (ref 11.5–14.5)
EST. GFR  (AFRICAN AMERICAN): >60 ML/MIN/1.73 M^2
EST. GFR  (NON AFRICAN AMERICAN): >60 ML/MIN/1.73 M^2
FERRITIN SERPL-MCNC: 79 NG/ML (ref 20–300)
GLUCOSE SERPL-MCNC: 210 MG/DL (ref 70–110)
HCT VFR BLD AUTO: 33.1 % (ref 37–48.5)
HGB BLD-MCNC: 11 G/DL (ref 12–16)
IMM GRANULOCYTES # BLD AUTO: 0.07 K/UL (ref 0–0.04)
IMM GRANULOCYTES NFR BLD AUTO: 0.6 % (ref 0–0.5)
IRON SERPL-MCNC: 54 UG/DL (ref 30–160)
LYMPHOCYTES # BLD AUTO: 3.6 K/UL (ref 1–4.8)
LYMPHOCYTES NFR BLD: 33.4 % (ref 18–48)
MCH RBC QN AUTO: 27.8 PG (ref 27–31)
MCHC RBC AUTO-ENTMCNC: 33.2 G/DL (ref 32–36)
MCV RBC AUTO: 84 FL (ref 82–98)
MONOCYTES # BLD AUTO: 0.4 K/UL (ref 0.3–1)
MONOCYTES NFR BLD: 4.1 % (ref 4–15)
NEUTROPHILS # BLD AUTO: 6.6 K/UL (ref 1.8–7.7)
NEUTROPHILS NFR BLD: 60.7 % (ref 38–73)
NRBC BLD-RTO: 0 /100 WBC
PLATELET # BLD AUTO: 298 K/UL (ref 150–450)
PMV BLD AUTO: 11.8 FL (ref 9.2–12.9)
POTASSIUM SERPL-SCNC: 3.6 MMOL/L (ref 3.5–5.1)
PROT SERPL-MCNC: 7.1 G/DL (ref 6–8.4)
RBC # BLD AUTO: 3.96 M/UL (ref 4–5.4)
SATURATED IRON: 17 % (ref 20–50)
SODIUM SERPL-SCNC: 143 MMOL/L (ref 136–145)
TOTAL IRON BINDING CAPACITY: 311 UG/DL (ref 250–450)
TRANSFERRIN SERPL-MCNC: 210 MG/DL (ref 200–375)
WBC # BLD AUTO: 10.86 K/UL (ref 3.9–12.7)

## 2022-03-31 PROCEDURE — 82728 ASSAY OF FERRITIN: CPT | Performed by: INTERNAL MEDICINE

## 2022-03-31 PROCEDURE — 85025 COMPLETE CBC W/AUTO DIFF WBC: CPT | Performed by: INTERNAL MEDICINE

## 2022-03-31 PROCEDURE — 80053 COMPREHEN METABOLIC PANEL: CPT | Performed by: INTERNAL MEDICINE

## 2022-03-31 PROCEDURE — 84466 ASSAY OF TRANSFERRIN: CPT | Performed by: INTERNAL MEDICINE

## 2022-03-31 PROCEDURE — 36415 COLL VENOUS BLD VENIPUNCTURE: CPT | Mod: PO | Performed by: INTERNAL MEDICINE

## 2022-04-06 ENCOUNTER — OFFICE VISIT (OUTPATIENT)
Dept: HEMATOLOGY/ONCOLOGY | Facility: CLINIC | Age: 67
End: 2022-04-06
Payer: MEDICARE

## 2022-04-06 VITALS
OXYGEN SATURATION: 98 % | TEMPERATURE: 98 F | WEIGHT: 222.69 LBS | BODY MASS INDEX: 34.95 KG/M2 | SYSTOLIC BLOOD PRESSURE: 128 MMHG | HEART RATE: 74 BPM | HEIGHT: 67 IN | DIASTOLIC BLOOD PRESSURE: 64 MMHG

## 2022-04-06 DIAGNOSIS — C50.911 MALIGNANT NEOPLASM OF RIGHT BREAST IN FEMALE, ESTROGEN RECEPTOR POSITIVE, UNSPECIFIED SITE OF BREAST: Primary | ICD-10-CM

## 2022-04-06 DIAGNOSIS — N95.1 HOT FLASHES DUE TO MENOPAUSE: ICD-10-CM

## 2022-04-06 DIAGNOSIS — D64.9 ANEMIA, UNSPECIFIED TYPE: ICD-10-CM

## 2022-04-06 DIAGNOSIS — Z17.0 MALIGNANT NEOPLASM OF RIGHT BREAST IN FEMALE, ESTROGEN RECEPTOR POSITIVE, UNSPECIFIED SITE OF BREAST: Primary | ICD-10-CM

## 2022-04-06 PROCEDURE — 1159F MED LIST DOCD IN RCRD: CPT | Mod: CPTII,S$GLB,, | Performed by: INTERNAL MEDICINE

## 2022-04-06 PROCEDURE — 1126F PR PAIN SEVERITY QUANTIFIED, NO PAIN PRESENT: ICD-10-PCS | Mod: CPTII,S$GLB,, | Performed by: INTERNAL MEDICINE

## 2022-04-06 PROCEDURE — 4010F PR ACE/ARB THEARPY RXD/TAKEN: ICD-10-PCS | Mod: CPTII,S$GLB,, | Performed by: INTERNAL MEDICINE

## 2022-04-06 PROCEDURE — 99214 PR OFFICE/OUTPT VISIT, EST, LEVL IV, 30-39 MIN: ICD-10-PCS | Mod: S$GLB,,, | Performed by: INTERNAL MEDICINE

## 2022-04-06 PROCEDURE — 3288F FALL RISK ASSESSMENT DOCD: CPT | Mod: CPTII,S$GLB,, | Performed by: INTERNAL MEDICINE

## 2022-04-06 PROCEDURE — 99214 OFFICE O/P EST MOD 30 MIN: CPT | Mod: S$GLB,,, | Performed by: INTERNAL MEDICINE

## 2022-04-06 PROCEDURE — 3051F PR MOST RECENT HEMOGLOBIN A1C LEVEL 7.0 - < 8.0%: ICD-10-PCS | Mod: CPTII,S$GLB,, | Performed by: INTERNAL MEDICINE

## 2022-04-06 PROCEDURE — 3288F PR FALLS RISK ASSESSMENT DOCUMENTED: ICD-10-PCS | Mod: CPTII,S$GLB,, | Performed by: INTERNAL MEDICINE

## 2022-04-06 PROCEDURE — 3008F BODY MASS INDEX DOCD: CPT | Mod: CPTII,S$GLB,, | Performed by: INTERNAL MEDICINE

## 2022-04-06 PROCEDURE — 1101F PR PT FALLS ASSESS DOC 0-1 FALLS W/OUT INJ PAST YR: ICD-10-PCS | Mod: CPTII,S$GLB,, | Performed by: INTERNAL MEDICINE

## 2022-04-06 PROCEDURE — 3078F DIAST BP <80 MM HG: CPT | Mod: CPTII,S$GLB,, | Performed by: INTERNAL MEDICINE

## 2022-04-06 PROCEDURE — 3074F PR MOST RECENT SYSTOLIC BLOOD PRESSURE < 130 MM HG: ICD-10-PCS | Mod: CPTII,S$GLB,, | Performed by: INTERNAL MEDICINE

## 2022-04-06 PROCEDURE — 3074F SYST BP LT 130 MM HG: CPT | Mod: CPTII,S$GLB,, | Performed by: INTERNAL MEDICINE

## 2022-04-06 PROCEDURE — 1101F PT FALLS ASSESS-DOCD LE1/YR: CPT | Mod: CPTII,S$GLB,, | Performed by: INTERNAL MEDICINE

## 2022-04-06 PROCEDURE — 99999 PR PBB SHADOW E&M-EST. PATIENT-LVL IV: ICD-10-PCS | Mod: PBBFAC,,, | Performed by: INTERNAL MEDICINE

## 2022-04-06 PROCEDURE — 1126F AMNT PAIN NOTED NONE PRSNT: CPT | Mod: CPTII,S$GLB,, | Performed by: INTERNAL MEDICINE

## 2022-04-06 PROCEDURE — 4010F ACE/ARB THERAPY RXD/TAKEN: CPT | Mod: CPTII,S$GLB,, | Performed by: INTERNAL MEDICINE

## 2022-04-06 PROCEDURE — 3051F HG A1C>EQUAL 7.0%<8.0%: CPT | Mod: CPTII,S$GLB,, | Performed by: INTERNAL MEDICINE

## 2022-04-06 PROCEDURE — 99999 PR PBB SHADOW E&M-EST. PATIENT-LVL IV: CPT | Mod: PBBFAC,,, | Performed by: INTERNAL MEDICINE

## 2022-04-06 PROCEDURE — 3078F PR MOST RECENT DIASTOLIC BLOOD PRESSURE < 80 MM HG: ICD-10-PCS | Mod: CPTII,S$GLB,, | Performed by: INTERNAL MEDICINE

## 2022-04-06 PROCEDURE — 3008F PR BODY MASS INDEX (BMI) DOCUMENTED: ICD-10-PCS | Mod: CPTII,S$GLB,, | Performed by: INTERNAL MEDICINE

## 2022-04-06 PROCEDURE — 1159F PR MEDICATION LIST DOCUMENTED IN MEDICAL RECORD: ICD-10-PCS | Mod: CPTII,S$GLB,, | Performed by: INTERNAL MEDICINE

## 2022-04-06 NOTE — PROGRESS NOTES
Chief Complaint :   Breast Cancer    Hx of Present illness :  Patient is a 67 y.o. year old female who presents to the clinic  for oncology followup. dx'd to have Carcinoma Right Breast  In . Lumpectomy and radiation therapy followed by adjuvant Arimidex therapy. Breast cancer Index did not show  Any benefit with extended harmonal therapy.  Completed arimidex therapy in 2018. Since last visit has had colonoscopic exam on 19 : documented to have a single sessile polyp in the ascending colon. polypectpomy done. Documented to have Grade 2 Internal hemmorrhoids.   Completed Covid vaccination.      Interval Hx: Former pt of Dr. Oliveira's   She completed 5yr of AI   No new issues  Doing well  Appetite and weight stable  No SOB/CP  She reports mild hot flashes    Allergies :    Review of patient's allergies indicates:   Allergen Reactions    Ace inhibitors Other (See Comments)     cough    Penicillins Hives       Occupation : retired .    Transfusion :  None    Menstrual & obstetric Hx :  3; para 3.  Age of menarche: 12  Age of first pregnancy: 18  Lactation history: None  Age of menopause:  Hysterectomy in her late 40's for bleeding  HRT:  For many years.    Present Meds :   Medication List with Changes/Refills   Current Medications    BLOOD SUGAR DIAGNOSTIC (TRUE METRIX GLUCOSE TEST STRIP) STRP    Use to test blood sugar bid and prn.    BLOOD-GLUCOSE METER (TRUE METRIX GLUCOSE METER) KIT    Use to test bid and prn    CALCIUM CARBONATE-VITAMIN D3 600 MG-5 MCG (200 UNIT) CAP    Take 1 tablet by mouth every morning.     FLASH GLUCOSE SCANNING READER (FREESTYLE LESLIE 10 DAY READER) MISC    Use as directed to check blood sugar tid and prn.    FLASH GLUCOSE SENSOR (FREESTYLE LESLIE 10 DAY SENSOR) KIT    1 Units by Misc.(Non-Drug; Combo Route) route 3 (three) times daily with meals.    GLIPIZIDE (GLUCOTROL) 10 MG TR24    Take 1 tablet (10 mg total) by mouth 2 (two) times daily.     HYDROCHLOROTHIAZIDE (HYDRODIURIL) 25 MG TABLET    Take 1 tablet (25 mg total) by mouth once daily.    LEVOTHYROXINE (EUTHYROX) 150 MCG TABLET    Take 1 tablet (150 mcg total) by mouth before breakfast.    LORATADINE (CLARITIN) 10 MG TABLET    Take 10 mg by mouth daily as needed.     LOSARTAN (COZAAR) 25 MG TABLET    Take 1 tablet (25 mg total) by mouth once daily.    LOVASTATIN (MEVACOR) 20 MG TABLET    Take 1 tablet (20 mg total) by mouth once daily.    METFORMIN (GLUCOPHAGE-XR) 500 MG ER 24HR TABLET    TAKE 2 TABLETS TWICE DAILY WITH MEALS    TRADJENTA 5 MG TAB TABLET    Take 1 tablet by mouth once daily       Past Medical Hx :  Carcinoma right breast. Diabetes Mellitus type 2; Hypertension; hyperlipidemia; Allergic rhinitis; osteopenia; sickle cell trait; Thyroid nodule. No Hx of hepatitis, TB, Sarcoid, Lupus, rheumatoid arthritis, seizure disorder, CVA, PE or DVT. No prior Hx of Chemotherapy    Past Medical Hx :  Past Medical History:   Diagnosis Date    AR (allergic rhinitis)     Breast cancer 06/2013    right; followed by oncology, Dr. Oliveira; s/p lumpectomy and  radiation    History of colonic polyps     Hyperlipidemia LDL goal < 100     Hypertension     Lower extremity edema     Mild chronic anemia     Obesity     Osteopenia     Sickle cell trait     Thyroid nodule     Type II or unspecified type diabetes mellitus without mention of complication, uncontrolled     Vitamin D deficiency disease        Travel Hx :  N/A    Immunization :  Immunization History   Administered Date(s) Administered    COVID-19, MRNA, LN-S, PF (Pfizer) (Gray Cap) 01/07/2022    COVID-19, MRNA, LN-S, PF (Pfizer) (Purple Cap) 02/19/2021, 03/12/2021    Influenza (FLUAD) - Quadrivalent - Adjuvanted - PF *Preferred* (65+) 10/13/2020, 12/02/2021    Influenza - Quadrivalent 10/08/2014    Influenza - Quadrivalent - PF *Preferred* (6 months and older) 11/26/2012, 01/10/2014, 09/28/2016, 12/06/2017, 12/12/2018, 02/04/2020     Influenza Split 11/26/2012, 01/10/2014    Pneumococcal Conjugate - 13 Valent 05/04/2016    Pneumococcal Polysaccharide - 23 Valent 08/13/2008, 12/06/2017    Tdap 07/24/2009       Family Hx : reviewed  Family History   Problem Relation Age of Onset    Diabetes Mother     Hypertension Mother     Hypertension Father     Prostate cancer Father     Diabetes Brother     Cancer Neg Hx     Breast cancer Neg Hx     Colon cancer Neg Hx     Ovarian cancer Neg Hx     Amblyopia Neg Hx     Blindness Neg Hx     Cataracts Neg Hx     Glaucoma Neg Hx     Macular degeneration Neg Hx     Retinal detachment Neg Hx     Strabismus Neg Hx     Stroke Neg Hx     Thyroid disease Neg Hx        Social Hx : Reviewed  Social History     Socioeconomic History    Marital status:     Number of children: 3   Occupational History    Occupation: retired   Tobacco Use    Smoking status: Never Smoker    Smokeless tobacco: Never Used   Substance and Sexual Activity    Alcohol use: No       Surgery :  Right Breast Bx; Lumpectomy Right; Tubal ligation; Complete Hysterectomy; Thyroidectomy.  Colonoscopy in 2014.  EGD in  Aug 2018  Colonoscopy 07/02/1`9    Symptoms :   Appetite normal; normal energy.   Review of Systems   Constitutional: Negative for fever, malaise/fatigue and weight loss.   HENT: Negative for congestion, hearing loss, nosebleeds and sore throat.    Eyes: Negative for blurred vision.        Wears Glasses   Respiratory: Negative for cough and shortness of breath.    Cardiovascular: Negative for chest pain and leg swelling.   Gastrointestinal: Negative for abdominal pain, blood in stool, constipation, diarrhea, heartburn, nausea and vomiting.   Genitourinary: Negative for dysuria, hematuria and urgency.   Musculoskeletal: Negative for back pain, myalgias and neck pain.        Mild   Skin: Negative for itching and rash.   Neurological: Negative for dizziness, tingling and headaches.   Endo/Heme/Allergies:  Negative for polydipsia. Does not bruise/bleed easily.        Mild hot flashes   Psychiatric/Behavioral: Negative for depression and memory loss. The patient is nervous/anxious (Sometimes) and has insomnia.        Physical Exam :    Physical Exam   Constitutional: She is oriented to person, place, and time. She appears well-developed and well-nourished.   HENT:   Head: Normocephalic.   Mouth/Throat: Oropharynx is clear and moist.   Eyes: Conjunctivae and lids are normal.No scleral icterus.   Neck: Normal range of motion. Neck supple. No thyromegaly present.   Cardiovascular: Normal rate, regular rhythm and normal heart sounds.    No murmur heard.  Pulmonary/Chest: Breath sounds normal. She has no wheezes. She has no rales.   Abdominal: Soft. Bowel sounds are normal. There is no tenderness. There is no rebound and no guarding.   Musculoskeletal: Normal range of motion. She exhibits no edema and no tenderness.   Lymphadenopathy:     She has no cervical adenopathy.     She has no axillary adenopathy.        Right: No supraclavicular adenopathy present.        Left: No supraclavicular adenopathy present.   Neurological: She is alert and oriented to person, place, and time. No cranial nerve deficit. Coordination normal.   Skin: Skin is warm and dry. No ecchymosis, no petechiae and no rash noted. No erythema.   Psychiatric: She has a normal mood and affect.       Labs & Imaging : 05/05/21 : Hgb 10.9; hct 32.6; MCV 84; platelets 257,000. ANC 7,000.  NFBS 161; cr.0.8; ca 9.7; Bili 0.6; Liver enzymes normal  CEA < 1.0;  CA 27.29 antigen : 23.2          12/30/2021 :  Hgb 10.6; Hct 31.4; MCV 82; Platelets 281,000. ANC 6,400.     NFBS 136; Cr.0.8; Ca 9.5; Bili 0.6. Liver enzymea normal. eGFR > 60.   CEA < 1.0; CA 27.29 antigen : 27.5           08/28/2020 : Hgb 10.8; hct 33; MCV 85; Platelets 252,000 ANC 7,700. ;      NFBS 167; Cr.0.9; Ca 10.2; bili 0.6; Liver enzymes normal.       eGFR > 60.  CEA < 1.0. CA 27.29 antigen normal.         12/16/19 : Hgb 10.9; Hct 32.3; MCV 85; Platelets 276,000 ANC 7,700. Serum iron 49; TIBC 311; sat 16 %; NFBS 128; Cr.0.8; ca 10.2; bili 0.6  Liver enzymes normal. eFR . 60.     Mammo 7/7/21 ACR BI-RADS Category 2 - Benign Findings       Lab Results   Component Value Date    WBC 10.86 03/31/2022    HGB 11.0 (L) 03/31/2022    HCT 33.1 (L) 03/31/2022    MCV 84 03/31/2022     03/31/2022       1. History of breast cancer    2. Anemia, unspecified type    3. Hot flashes due to menopause              Assessment & Plan:  Reviewedlabs  with patient.   Mammo 7/7/21 ACR BI-RADS Category 2 - Benign Findings   Doing well     2. Hb 11.1g/dL -stable  Colonoscopy 2019- benign polyp ascending colon  Fe studies okay except for borderline Fe sats  3.  Pt declines Clonidine  Pt declines Acupuncture Referral       Cbc, Fe studies, SPEP/MEKA  in 3mos  Cbc, Ferritin, TIBC and Fe prior to f/u  6mos

## 2022-05-09 ENCOUNTER — OFFICE VISIT (OUTPATIENT)
Dept: FAMILY MEDICINE | Facility: CLINIC | Age: 67
End: 2022-05-09
Payer: MEDICARE

## 2022-05-09 VITALS
BODY MASS INDEX: 34.52 KG/M2 | OXYGEN SATURATION: 97 % | DIASTOLIC BLOOD PRESSURE: 72 MMHG | SYSTOLIC BLOOD PRESSURE: 128 MMHG | HEART RATE: 75 BPM | WEIGHT: 219.94 LBS | HEIGHT: 67 IN | TEMPERATURE: 97 F

## 2022-05-09 DIAGNOSIS — R82.2 BILIRUBIN IN URINE: Primary | ICD-10-CM

## 2022-05-09 DIAGNOSIS — Z78.0 MENOPAUSE: ICD-10-CM

## 2022-05-09 DIAGNOSIS — R31.0 GROSS HEMATURIA: ICD-10-CM

## 2022-05-09 DIAGNOSIS — N30.01 ACUTE CYSTITIS WITH HEMATURIA: ICD-10-CM

## 2022-05-09 LAB
BILIRUB SERPL-MCNC: ABNORMAL MG/DL
BLOOD URINE, POC: ABNORMAL
CLARITY, POC UA: ABNORMAL
COLOR, POC UA: ABNORMAL
GLUCOSE UR QL STRIP: 50
KETONES UR QL STRIP: ABNORMAL
LEUKOCYTE ESTERASE URINE, POC: ABNORMAL
NITRITE, POC UA: NEGATIVE
PH, POC UA: 6
PROTEIN, POC: NEGATIVE
SPECIFIC GRAVITY, POC UA: 1005
UROBILINOGEN, POC UA: NORMAL

## 2022-05-09 PROCEDURE — 87086 URINE CULTURE/COLONY COUNT: CPT | Performed by: STUDENT IN AN ORGANIZED HEALTH CARE EDUCATION/TRAINING PROGRAM

## 2022-05-09 PROCEDURE — 87077 CULTURE AEROBIC IDENTIFY: CPT | Performed by: STUDENT IN AN ORGANIZED HEALTH CARE EDUCATION/TRAINING PROGRAM

## 2022-05-09 PROCEDURE — 1101F PR PT FALLS ASSESS DOC 0-1 FALLS W/OUT INJ PAST YR: ICD-10-PCS | Mod: CPTII,S$GLB,, | Performed by: STUDENT IN AN ORGANIZED HEALTH CARE EDUCATION/TRAINING PROGRAM

## 2022-05-09 PROCEDURE — 99999 PR PBB SHADOW E&M-EST. PATIENT-LVL IV: CPT | Mod: PBBFAC,,, | Performed by: STUDENT IN AN ORGANIZED HEALTH CARE EDUCATION/TRAINING PROGRAM

## 2022-05-09 PROCEDURE — 1159F PR MEDICATION LIST DOCUMENTED IN MEDICAL RECORD: ICD-10-PCS | Mod: CPTII,S$GLB,, | Performed by: STUDENT IN AN ORGANIZED HEALTH CARE EDUCATION/TRAINING PROGRAM

## 2022-05-09 PROCEDURE — 81002 URINALYSIS NONAUTO W/O SCOPE: CPT | Mod: S$GLB,,, | Performed by: STUDENT IN AN ORGANIZED HEALTH CARE EDUCATION/TRAINING PROGRAM

## 2022-05-09 PROCEDURE — 3051F PR MOST RECENT HEMOGLOBIN A1C LEVEL 7.0 - < 8.0%: ICD-10-PCS | Mod: CPTII,S$GLB,, | Performed by: STUDENT IN AN ORGANIZED HEALTH CARE EDUCATION/TRAINING PROGRAM

## 2022-05-09 PROCEDURE — 99214 OFFICE O/P EST MOD 30 MIN: CPT | Mod: S$GLB,,, | Performed by: STUDENT IN AN ORGANIZED HEALTH CARE EDUCATION/TRAINING PROGRAM

## 2022-05-09 PROCEDURE — 1126F AMNT PAIN NOTED NONE PRSNT: CPT | Mod: CPTII,S$GLB,, | Performed by: STUDENT IN AN ORGANIZED HEALTH CARE EDUCATION/TRAINING PROGRAM

## 2022-05-09 PROCEDURE — 3078F PR MOST RECENT DIASTOLIC BLOOD PRESSURE < 80 MM HG: ICD-10-PCS | Mod: CPTII,S$GLB,, | Performed by: STUDENT IN AN ORGANIZED HEALTH CARE EDUCATION/TRAINING PROGRAM

## 2022-05-09 PROCEDURE — 3051F HG A1C>EQUAL 7.0%<8.0%: CPT | Mod: CPTII,S$GLB,, | Performed by: STUDENT IN AN ORGANIZED HEALTH CARE EDUCATION/TRAINING PROGRAM

## 2022-05-09 PROCEDURE — 99999 PR PBB SHADOW E&M-EST. PATIENT-LVL IV: ICD-10-PCS | Mod: PBBFAC,,, | Performed by: STUDENT IN AN ORGANIZED HEALTH CARE EDUCATION/TRAINING PROGRAM

## 2022-05-09 PROCEDURE — 4010F ACE/ARB THERAPY RXD/TAKEN: CPT | Mod: CPTII,S$GLB,, | Performed by: STUDENT IN AN ORGANIZED HEALTH CARE EDUCATION/TRAINING PROGRAM

## 2022-05-09 PROCEDURE — 1101F PT FALLS ASSESS-DOCD LE1/YR: CPT | Mod: CPTII,S$GLB,, | Performed by: STUDENT IN AN ORGANIZED HEALTH CARE EDUCATION/TRAINING PROGRAM

## 2022-05-09 PROCEDURE — 3074F PR MOST RECENT SYSTOLIC BLOOD PRESSURE < 130 MM HG: ICD-10-PCS | Mod: CPTII,S$GLB,, | Performed by: STUDENT IN AN ORGANIZED HEALTH CARE EDUCATION/TRAINING PROGRAM

## 2022-05-09 PROCEDURE — 87088 URINE BACTERIA CULTURE: CPT | Performed by: STUDENT IN AN ORGANIZED HEALTH CARE EDUCATION/TRAINING PROGRAM

## 2022-05-09 PROCEDURE — 3288F FALL RISK ASSESSMENT DOCD: CPT | Mod: CPTII,S$GLB,, | Performed by: STUDENT IN AN ORGANIZED HEALTH CARE EDUCATION/TRAINING PROGRAM

## 2022-05-09 PROCEDURE — 81002 POCT URINE DIPSTICK WITHOUT MICROSCOPE: ICD-10-PCS | Mod: S$GLB,,, | Performed by: STUDENT IN AN ORGANIZED HEALTH CARE EDUCATION/TRAINING PROGRAM

## 2022-05-09 PROCEDURE — 4010F PR ACE/ARB THEARPY RXD/TAKEN: ICD-10-PCS | Mod: CPTII,S$GLB,, | Performed by: STUDENT IN AN ORGANIZED HEALTH CARE EDUCATION/TRAINING PROGRAM

## 2022-05-09 PROCEDURE — 3008F PR BODY MASS INDEX (BMI) DOCUMENTED: ICD-10-PCS | Mod: CPTII,S$GLB,, | Performed by: STUDENT IN AN ORGANIZED HEALTH CARE EDUCATION/TRAINING PROGRAM

## 2022-05-09 PROCEDURE — 87186 SC STD MICRODIL/AGAR DIL: CPT | Mod: 59 | Performed by: STUDENT IN AN ORGANIZED HEALTH CARE EDUCATION/TRAINING PROGRAM

## 2022-05-09 PROCEDURE — 3078F DIAST BP <80 MM HG: CPT | Mod: CPTII,S$GLB,, | Performed by: STUDENT IN AN ORGANIZED HEALTH CARE EDUCATION/TRAINING PROGRAM

## 2022-05-09 PROCEDURE — 1126F PR PAIN SEVERITY QUANTIFIED, NO PAIN PRESENT: ICD-10-PCS | Mod: CPTII,S$GLB,, | Performed by: STUDENT IN AN ORGANIZED HEALTH CARE EDUCATION/TRAINING PROGRAM

## 2022-05-09 PROCEDURE — 3008F BODY MASS INDEX DOCD: CPT | Mod: CPTII,S$GLB,, | Performed by: STUDENT IN AN ORGANIZED HEALTH CARE EDUCATION/TRAINING PROGRAM

## 2022-05-09 PROCEDURE — 3074F SYST BP LT 130 MM HG: CPT | Mod: CPTII,S$GLB,, | Performed by: STUDENT IN AN ORGANIZED HEALTH CARE EDUCATION/TRAINING PROGRAM

## 2022-05-09 PROCEDURE — 99214 PR OFFICE/OUTPT VISIT, EST, LEVL IV, 30-39 MIN: ICD-10-PCS | Mod: S$GLB,,, | Performed by: STUDENT IN AN ORGANIZED HEALTH CARE EDUCATION/TRAINING PROGRAM

## 2022-05-09 PROCEDURE — 3288F PR FALLS RISK ASSESSMENT DOCUMENTED: ICD-10-PCS | Mod: CPTII,S$GLB,, | Performed by: STUDENT IN AN ORGANIZED HEALTH CARE EDUCATION/TRAINING PROGRAM

## 2022-05-09 PROCEDURE — 1159F MED LIST DOCD IN RCRD: CPT | Mod: CPTII,S$GLB,, | Performed by: STUDENT IN AN ORGANIZED HEALTH CARE EDUCATION/TRAINING PROGRAM

## 2022-05-09 RX ORDER — BNT162B2 0.23 MG/2.25ML
INJECTION, SUSPENSION INTRAMUSCULAR
COMMUNITY
Start: 2022-01-07 | End: 2022-08-08 | Stop reason: ALTCHOICE

## 2022-05-09 NOTE — PROGRESS NOTES
05/11/2022    Masha Garcia  [id number]    Chief Complaint   Patient presents with    Hematuria       HPI    For one week with blood in urine  Thought it was food related b/c she has beets  Has been drinking more water  Has not happened before  Is concerned b/c she is a cancer patient  The last two days with strange feeling in back, no dysuria  Also noticed that Bg has been elevated in the last week    Negative 10 point ROS outside of HPI    Social History     Socioeconomic History    Marital status:     Number of children: 3   Occupational History    Occupation: retired   Tobacco Use    Smoking status: Never Smoker    Smokeless tobacco: Never Used   Substance and Sexual Activity    Alcohol use: No           Current Outpatient Medications:     blood sugar diagnostic (TRUE METRIX GLUCOSE TEST STRIP) Strp, Use to test blood sugar bid and prn., Disp: 200 strip, Rfl: 11    blood-glucose meter (TRUE METRIX GLUCOSE METER) kit, Use to test bid and prn, Disp: 1 each, Rfl: 0    calcium carbonate-vitamin D3 600 mg-5 mcg (200 unit) Cap, Take 1 tablet by mouth every morning. , Disp: , Rfl:     flash glucose scanning reader (FREESTYLE LESLIE 10 DAY READER) Misc, Use as directed to check blood sugar tid and prn., Disp: 1 each, Rfl: 0    flash glucose sensor (FREESTYLE LESLIE 10 DAY SENSOR) Kit, 1 Units by Misc.(Non-Drug; Combo Route) route 3 (three) times daily with meals., Disp: 1 kit, Rfl: 11    glipiZIDE (GLUCOTROL) 10 MG TR24, Take 1 tablet (10 mg total) by mouth 2 (two) times daily., Disp: 180 tablet, Rfl: 1    hydroCHLOROthiazide (HYDRODIURIL) 25 MG tablet, Take 1 tablet (25 mg total) by mouth once daily., Disp: 90 tablet, Rfl: 1    levothyroxine (EUTHYROX) 150 MCG tablet, Take 1 tablet (150 mcg total) by mouth before breakfast., Disp: 90 tablet, Rfl: 1    loratadine (CLARITIN) 10 mg tablet, Take 10 mg by mouth daily as needed. , Disp: , Rfl:     losartan (COZAAR) 25 MG tablet, Take 1 tablet (25 mg  total) by mouth once daily., Disp: 90 tablet, Rfl: 1    lovastatin (MEVACOR) 20 MG tablet, Take 1 tablet (20 mg total) by mouth once daily., Disp: 90 tablet, Rfl: 1    metFORMIN (GLUCOPHAGE-XR) 500 MG ER 24hr tablet, TAKE 2 TABLETS TWICE DAILY WITH MEALS, Disp: 360 tablet, Rfl: 1    TRADJENTA 5 mg Tab tablet, Take 1 tablet by mouth once daily, Disp: 90 tablet, Rfl: 1    nitrofurantoin, macrocrystal-monohydrate, (MACROBID) 100 MG capsule, Take 1 capsule (100 mg total) by mouth 2 (two) times daily. for 5 days, Disp: 10 capsule, Rfl: 0    PFIZER COVID-19 SHERI VACCN,PF, 30 mcg/0.3 mL injection, , Disp: , Rfl:       Physical Exam  Vitals:    05/09/22 1120   BP: 128/72   Pulse: 75   Temp: 97.4 °F (36.3 °C)       Gen: well appearing, NAD  Resp: non labored breathing, no crackles, no wheezes, CTAB  CV: RRR no murmur, gallops, rubs, no LE edema  Abd: soft nontender BS present no organomegaly, no CVA tenderness    1. Menopause  - DXA Bone Density Spine And Hip; Future    2. Bilirubin in urine  - Urinalysis; Future  - Urine culture  - Comprehensive Metabolic Panel; Future    3. Gross hematuria  - POCT URINE DIPSTICK WITHOUT MICROSCOPE  - nitrofurantoin, macrocrystal-monohydrate, (MACROBID) 100 MG capsule; Take 1 capsule (100 mg total) by mouth 2 (two) times daily. for 5 days  Dispense: 10 capsule; Refill: 0    4. Acute cystitis with hematuria  - nitrofurantoin, macrocrystal-monohydrate, (MACROBID) 100 MG capsule; Take 1 capsule (100 mg total) by mouth 2 (two) times daily. for 5 days  Dispense: 10 capsule; Refill: 0      RTC if worsening or no improvement in 1-2 weeks        Marixa Modi MD  Family Medicine

## 2022-05-10 ENCOUNTER — HOSPITAL ENCOUNTER (OUTPATIENT)
Dept: RADIOLOGY | Facility: CLINIC | Age: 67
Discharge: HOME | End: 2022-05-10
Attending: STUDENT IN AN ORGANIZED HEALTH CARE EDUCATION/TRAINING PROGRAM
Payer: MEDICARE

## 2022-05-10 DIAGNOSIS — Z78.0 MENOPAUSE: ICD-10-CM

## 2022-05-10 DIAGNOSIS — D64.9 ANEMIA, UNSPECIFIED TYPE: Primary | ICD-10-CM

## 2022-05-10 PROCEDURE — 77080 DXA BONE DENSITY AXIAL: CPT | Mod: TC,PO

## 2022-05-10 PROCEDURE — 77080 DXA BONE DENSITY AXIAL: CPT | Mod: 26,,, | Performed by: INTERNAL MEDICINE

## 2022-05-10 PROCEDURE — 77080 DEXA BONE DENSITY SPINE HIP: ICD-10-PCS | Mod: 26,,, | Performed by: INTERNAL MEDICINE

## 2022-05-11 ENCOUNTER — TELEPHONE (OUTPATIENT)
Dept: FAMILY MEDICINE | Facility: CLINIC | Age: 67
End: 2022-05-11
Payer: MEDICARE

## 2022-05-11 LAB — BACTERIA UR CULT: ABNORMAL

## 2022-05-11 RX ORDER — NITROFURANTOIN 25; 75 MG/1; MG/1
100 CAPSULE ORAL 2 TIMES DAILY
Qty: 10 CAPSULE | Refills: 0 | Status: SHIPPED | OUTPATIENT
Start: 2022-05-11 | End: 2022-05-16

## 2022-05-11 NOTE — TELEPHONE ENCOUNTER
----- Message from Marixa Modi MD sent at 5/11/2022  7:24 AM CDT -----  Please let pt know I have sent antibiotics to her pharmacy for a bladder infection.

## 2022-05-11 NOTE — TELEPHONE ENCOUNTER
----- Message from Marixa Modi MD sent at 5/11/2022  7:17 AM CDT -----  Please let pt know that her potassium was mildly low, which should not have caused her dizziness. She remains mildly anemic but her iron level has improved compared to last check.

## 2022-07-06 DIAGNOSIS — E78.5 HYPERLIPIDEMIA WITH TARGET LDL LESS THAN 100: ICD-10-CM

## 2022-07-06 NOTE — TELEPHONE ENCOUNTER
No new care gaps identified.  [facility] Embedded Care Gaps. Reference number: [id number]. 7/06/2022   2:45:18 PM CDT

## 2022-07-06 NOTE — TELEPHONE ENCOUNTER
Refill Routing Note   Medication(s) are not appropriate for processing by [facility] for the following reason(s):      - Required laboratory values are outdated    [facility] action(s):  Defer Medication-related problems identified: Requires labs     Medication Therapy Plan: Labs (lipid panel) outdated, not scheduled  Medication reconciliation completed: No     Appointments  past 12m or future 3m with PCP    Date Provider   Last Visit   1/28/2022 Brenda Bailey MD   Next Visit   8/8/2022 Brenda Bailey MD   ED visits in past 90 days: 0        Note composed:6:26 PM 07/06/2022

## 2022-07-07 ENCOUNTER — LAB VISIT (OUTPATIENT)
Dept: LAB | Facility: HOSPITAL | Age: 67
End: 2022-07-07
Attending: INTERNAL MEDICINE
Payer: MEDICARE

## 2022-07-07 DIAGNOSIS — D64.9 ANEMIA, UNSPECIFIED TYPE: ICD-10-CM

## 2022-07-07 LAB
BASOPHILS # BLD AUTO: 0.05 K/UL (ref 0–0.2)
BASOPHILS NFR BLD: 0.5 % (ref 0–1.9)
DIFFERENTIAL METHOD: ABNORMAL
EOSINOPHIL # BLD AUTO: 0.1 K/UL (ref 0–0.5)
EOSINOPHIL NFR BLD: 0.8 % (ref 0–8)
ERYTHROCYTE [DISTWIDTH] IN BLOOD BY AUTOMATED COUNT: 15.1 % (ref 11.5–14.5)
FERRITIN SERPL-MCNC: 99 NG/ML (ref 20–300)
HCT VFR BLD AUTO: 33.9 % (ref 37–48.5)
HGB BLD-MCNC: 10.9 G/DL (ref 12–16)
IMM GRANULOCYTES # BLD AUTO: 0.05 K/UL (ref 0–0.04)
IMM GRANULOCYTES NFR BLD AUTO: 0.5 % (ref 0–0.5)
LYMPHOCYTES # BLD AUTO: 3.2 K/UL (ref 1–4.8)
LYMPHOCYTES NFR BLD: 28.7 % (ref 18–48)
MCH RBC QN AUTO: 27.3 PG (ref 27–31)
MCHC RBC AUTO-ENTMCNC: 32.2 G/DL (ref 32–36)
MCV RBC AUTO: 85 FL (ref 82–98)
MONOCYTES # BLD AUTO: 0.5 K/UL (ref 0.3–1)
MONOCYTES NFR BLD: 4.1 % (ref 4–15)
NEUTROPHILS # BLD AUTO: 7.2 K/UL (ref 1.8–7.7)
NEUTROPHILS NFR BLD: 65.4 % (ref 38–73)
NRBC BLD-RTO: 0 /100 WBC
PLATELET # BLD AUTO: 328 K/UL (ref 150–450)
PMV BLD AUTO: 11.6 FL (ref 9.2–12.9)
RBC # BLD AUTO: 3.99 M/UL (ref 4–5.4)
WBC # BLD AUTO: 11.04 K/UL (ref 3.9–12.7)

## 2022-07-07 PROCEDURE — 84165 PATHOLOGIST INTERPRETATION SPE: ICD-10-PCS | Mod: 26,,, | Performed by: PATHOLOGY

## 2022-07-07 PROCEDURE — 86334 PATHOLOGIST INTERPRETATION IFE: ICD-10-PCS | Mod: 26,,, | Performed by: PATHOLOGY

## 2022-07-07 PROCEDURE — 86334 IMMUNOFIX E-PHORESIS SERUM: CPT | Performed by: INTERNAL MEDICINE

## 2022-07-07 PROCEDURE — 85025 COMPLETE CBC W/AUTO DIFF WBC: CPT | Performed by: INTERNAL MEDICINE

## 2022-07-07 PROCEDURE — 36415 COLL VENOUS BLD VENIPUNCTURE: CPT | Mod: PO | Performed by: INTERNAL MEDICINE

## 2022-07-07 PROCEDURE — 84165 PROTEIN E-PHORESIS SERUM: CPT | Mod: 26,,, | Performed by: PATHOLOGY

## 2022-07-07 PROCEDURE — 82728 ASSAY OF FERRITIN: CPT | Performed by: INTERNAL MEDICINE

## 2022-07-07 PROCEDURE — 86334 IMMUNOFIX E-PHORESIS SERUM: CPT | Mod: 26,,, | Performed by: PATHOLOGY

## 2022-07-07 PROCEDURE — 84165 PROTEIN E-PHORESIS SERUM: CPT | Performed by: INTERNAL MEDICINE

## 2022-07-07 RX ORDER — LOVASTATIN 20 MG/1
20 TABLET ORAL DAILY
Qty: 90 TABLET | Refills: 0 | Status: SHIPPED | OUTPATIENT
Start: 2022-07-07 | End: 2022-11-29

## 2022-07-08 LAB
ALBUMIN SERPL ELPH-MCNC: 3.38 G/DL (ref 3.35–5.55)
ALPHA1 GLOB SERPL ELPH-MCNC: 0.35 G/DL (ref 0.17–0.41)
ALPHA2 GLOB SERPL ELPH-MCNC: 0.76 G/DL (ref 0.43–0.99)
B-GLOBULIN SERPL ELPH-MCNC: 0.94 G/DL (ref 0.5–1.1)
GAMMA GLOB SERPL ELPH-MCNC: 1.27 G/DL (ref 0.67–1.58)
INTERPRETATION SERPL IFE-IMP: NORMAL
PROT SERPL-MCNC: 6.7 G/DL (ref 6–8.4)

## 2022-07-13 LAB
PATHOLOGIST INTERPRETATION IFE: NORMAL
PATHOLOGIST INTERPRETATION SPE: NORMAL

## 2022-07-14 LAB — BCS RECOMMENDATION EXT: NORMAL

## 2022-07-19 DIAGNOSIS — E78.5 HYPERLIPIDEMIA WITH TARGET LDL LESS THAN 100: Primary | ICD-10-CM

## 2022-07-19 DIAGNOSIS — I10 ESSENTIAL HYPERTENSION: ICD-10-CM

## 2022-07-19 DIAGNOSIS — E11.65 UNCONTROLLED TYPE 2 DIABETES MELLITUS WITH HYPERGLYCEMIA: ICD-10-CM

## 2022-07-20 ENCOUNTER — TELEPHONE (OUTPATIENT)
Dept: FAMILY MEDICINE | Facility: CLINIC | Age: 67
End: 2022-07-20
Payer: MEDICARE

## 2022-07-20 DIAGNOSIS — E11.9 TYPE 2 DIABETES MELLITUS WITHOUT COMPLICATION, UNSPECIFIED WHETHER LONG TERM INSULIN USE: ICD-10-CM

## 2022-07-20 DIAGNOSIS — Z12.31 SCREENING MAMMOGRAM, ENCOUNTER FOR: Primary | ICD-10-CM

## 2022-07-20 NOTE — TELEPHONE ENCOUNTER
----- Message from Brenda Bailey MD sent at 7/19/2022  3:57 PM CDT -----  Regarding: Labs and HM prior to next ov  Please call patient to schedule labs and address health maintenance prior to next office visit.

## 2022-07-20 NOTE — TELEPHONE ENCOUNTER
Scheduled pt's labs before OV.   Pt stated she completed mmg at [facility].   Pt's eye exam is scheduled already.

## 2022-08-03 ENCOUNTER — LAB VISIT (OUTPATIENT)
Dept: LAB | Facility: HOSPITAL | Age: 67
End: 2022-08-03
Attending: INTERNAL MEDICINE
Payer: MEDICARE

## 2022-08-03 DIAGNOSIS — E11.65 UNCONTROLLED TYPE 2 DIABETES MELLITUS WITH HYPERGLYCEMIA: ICD-10-CM

## 2022-08-03 DIAGNOSIS — E78.5 HYPERLIPIDEMIA WITH TARGET LDL LESS THAN 100: ICD-10-CM

## 2022-08-03 LAB
CHOLEST SERPL-MCNC: 145 MG/DL (ref 120–199)
CHOLEST/HDLC SERPL: 2.8 {RATIO} (ref 2–5)
ESTIMATED AVG GLUCOSE: 160 MG/DL (ref 68–131)
HBA1C MFR BLD: 7.2 % (ref 4–5.6)
HDLC SERPL-MCNC: 52 MG/DL (ref 40–75)
HDLC SERPL: 35.9 % (ref 20–50)
LDLC SERPL CALC-MCNC: 70.2 MG/DL (ref 63–159)
NONHDLC SERPL-MCNC: 93 MG/DL
TRIGL SERPL-MCNC: 114 MG/DL (ref 30–150)

## 2022-08-03 PROCEDURE — 83036 HEMOGLOBIN GLYCOSYLATED A1C: CPT | Performed by: INTERNAL MEDICINE

## 2022-08-03 PROCEDURE — 80061 LIPID PANEL: CPT | Performed by: INTERNAL MEDICINE

## 2022-08-03 PROCEDURE — 36415 COLL VENOUS BLD VENIPUNCTURE: CPT | Mod: PO | Performed by: INTERNAL MEDICINE

## 2022-08-08 ENCOUNTER — OFFICE VISIT (OUTPATIENT)
Dept: FAMILY MEDICINE | Facility: CLINIC | Age: 67
End: 2022-08-08
Payer: MEDICARE

## 2022-08-08 VITALS
BODY MASS INDEX: 35.38 KG/M2 | DIASTOLIC BLOOD PRESSURE: 70 MMHG | OXYGEN SATURATION: 97 % | SYSTOLIC BLOOD PRESSURE: 128 MMHG | HEART RATE: 67 BPM | TEMPERATURE: 98 F | WEIGHT: 225.44 LBS | HEIGHT: 67 IN

## 2022-08-08 DIAGNOSIS — Z23 NEED FOR SHINGLES VACCINE: ICD-10-CM

## 2022-08-08 DIAGNOSIS — M85.80 OSTEOPENIA AFTER MENOPAUSE: ICD-10-CM

## 2022-08-08 DIAGNOSIS — E89.0 HYPOTHYROIDISM, POSTSURGICAL: ICD-10-CM

## 2022-08-08 DIAGNOSIS — E11.65 UNCONTROLLED TYPE 2 DIABETES MELLITUS WITH HYPERGLYCEMIA: Primary | ICD-10-CM

## 2022-08-08 DIAGNOSIS — Z78.0 OSTEOPENIA AFTER MENOPAUSE: ICD-10-CM

## 2022-08-08 DIAGNOSIS — D57.3 SICKLE CELL TRAIT: ICD-10-CM

## 2022-08-08 DIAGNOSIS — E66.01 SEVERE OBESITY (BMI 35.0-35.9 WITH COMORBIDITY): ICD-10-CM

## 2022-08-08 DIAGNOSIS — Z85.3 HX: BREAST CANCER: ICD-10-CM

## 2022-08-08 DIAGNOSIS — E78.5 HYPERLIPIDEMIA WITH TARGET LDL LESS THAN 100: ICD-10-CM

## 2022-08-08 DIAGNOSIS — I10 ESSENTIAL HYPERTENSION: ICD-10-CM

## 2022-08-08 PROCEDURE — 1126F AMNT PAIN NOTED NONE PRSNT: CPT | Mod: CPTII,S$GLB,, | Performed by: INTERNAL MEDICINE

## 2022-08-08 PROCEDURE — 4010F PR ACE/ARB THEARPY RXD/TAKEN: ICD-10-PCS | Mod: CPTII,S$GLB,, | Performed by: INTERNAL MEDICINE

## 2022-08-08 PROCEDURE — 1160F PR REVIEW ALL MEDS BY PRESCRIBER/CLIN PHARMACIST DOCUMENTED: ICD-10-PCS | Mod: CPTII,S$GLB,, | Performed by: INTERNAL MEDICINE

## 2022-08-08 PROCEDURE — 3288F PR FALLS RISK ASSESSMENT DOCUMENTED: ICD-10-PCS | Mod: CPTII,S$GLB,, | Performed by: INTERNAL MEDICINE

## 2022-08-08 PROCEDURE — 1160F RVW MEDS BY RX/DR IN RCRD: CPT | Mod: CPTII,S$GLB,, | Performed by: INTERNAL MEDICINE

## 2022-08-08 PROCEDURE — 3051F HG A1C>EQUAL 7.0%<8.0%: CPT | Mod: CPTII,S$GLB,, | Performed by: INTERNAL MEDICINE

## 2022-08-08 PROCEDURE — 3074F PR MOST RECENT SYSTOLIC BLOOD PRESSURE < 130 MM HG: ICD-10-PCS | Mod: CPTII,S$GLB,, | Performed by: INTERNAL MEDICINE

## 2022-08-08 PROCEDURE — 3051F PR MOST RECENT HEMOGLOBIN A1C LEVEL 7.0 - < 8.0%: ICD-10-PCS | Mod: CPTII,S$GLB,, | Performed by: INTERNAL MEDICINE

## 2022-08-08 PROCEDURE — 3074F SYST BP LT 130 MM HG: CPT | Mod: CPTII,S$GLB,, | Performed by: INTERNAL MEDICINE

## 2022-08-08 PROCEDURE — 3008F BODY MASS INDEX DOCD: CPT | Mod: CPTII,S$GLB,, | Performed by: INTERNAL MEDICINE

## 2022-08-08 PROCEDURE — 3078F PR MOST RECENT DIASTOLIC BLOOD PRESSURE < 80 MM HG: ICD-10-PCS | Mod: CPTII,S$GLB,, | Performed by: INTERNAL MEDICINE

## 2022-08-08 PROCEDURE — 99999 PR PBB SHADOW E&M-EST. PATIENT-LVL IV: ICD-10-PCS | Mod: PBBFAC,,, | Performed by: INTERNAL MEDICINE

## 2022-08-08 PROCEDURE — 3060F POS MICROALBUMINURIA REV: CPT | Mod: CPTII,S$GLB,, | Performed by: INTERNAL MEDICINE

## 2022-08-08 PROCEDURE — 3066F PR DOCUMENTATION OF TREATMENT FOR NEPHROPATHY: ICD-10-PCS | Mod: CPTII,S$GLB,, | Performed by: INTERNAL MEDICINE

## 2022-08-08 PROCEDURE — 3008F PR BODY MASS INDEX (BMI) DOCUMENTED: ICD-10-PCS | Mod: CPTII,S$GLB,, | Performed by: INTERNAL MEDICINE

## 2022-08-08 PROCEDURE — 1101F PR PT FALLS ASSESS DOC 0-1 FALLS W/OUT INJ PAST YR: ICD-10-PCS | Mod: CPTII,S$GLB,, | Performed by: INTERNAL MEDICINE

## 2022-08-08 PROCEDURE — 1101F PT FALLS ASSESS-DOCD LE1/YR: CPT | Mod: CPTII,S$GLB,, | Performed by: INTERNAL MEDICINE

## 2022-08-08 PROCEDURE — 4010F ACE/ARB THERAPY RXD/TAKEN: CPT | Mod: CPTII,S$GLB,, | Performed by: INTERNAL MEDICINE

## 2022-08-08 PROCEDURE — 3078F DIAST BP <80 MM HG: CPT | Mod: CPTII,S$GLB,, | Performed by: INTERNAL MEDICINE

## 2022-08-08 PROCEDURE — 3066F NEPHROPATHY DOC TX: CPT | Mod: CPTII,S$GLB,, | Performed by: INTERNAL MEDICINE

## 2022-08-08 PROCEDURE — 99214 PR OFFICE/OUTPT VISIT, EST, LEVL IV, 30-39 MIN: ICD-10-PCS | Mod: S$GLB,,, | Performed by: INTERNAL MEDICINE

## 2022-08-08 PROCEDURE — 99999 PR PBB SHADOW E&M-EST. PATIENT-LVL IV: CPT | Mod: PBBFAC,,, | Performed by: INTERNAL MEDICINE

## 2022-08-08 PROCEDURE — 3060F PR POS MICROALBUMINURIA RESULT DOCUMENTED/REVIEW: ICD-10-PCS | Mod: CPTII,S$GLB,, | Performed by: INTERNAL MEDICINE

## 2022-08-08 PROCEDURE — 1159F PR MEDICATION LIST DOCUMENTED IN MEDICAL RECORD: ICD-10-PCS | Mod: CPTII,S$GLB,, | Performed by: INTERNAL MEDICINE

## 2022-08-08 PROCEDURE — 1159F MED LIST DOCD IN RCRD: CPT | Mod: CPTII,S$GLB,, | Performed by: INTERNAL MEDICINE

## 2022-08-08 PROCEDURE — 99214 OFFICE O/P EST MOD 30 MIN: CPT | Mod: S$GLB,,, | Performed by: INTERNAL MEDICINE

## 2022-08-08 PROCEDURE — 3288F FALL RISK ASSESSMENT DOCD: CPT | Mod: CPTII,S$GLB,, | Performed by: INTERNAL MEDICINE

## 2022-08-08 PROCEDURE — 1126F PR PAIN SEVERITY QUANTIFIED, NO PAIN PRESENT: ICD-10-PCS | Mod: CPTII,S$GLB,, | Performed by: INTERNAL MEDICINE

## 2022-08-08 NOTE — PROGRESS NOTES
274}  HISTORY OF PRESENT ILLNESS:  Masha Garcia is a 67 y.o. female who presents to the clinic today for General Diabetes Follow-up  .     The patient presents to clinic today for follow-up of her type 2 diabetes mellitus, hypertension, hyperlipidemia, and hypothyroidism.  Since her last office visit with me she saw a nutritionist.  She has made some dietary changes.  She reports improvement in her blood sugar readings.  She states that morning blood sugars can be as low as 99.  Sometimes his does make her feel a little bit shaky.  About 2 hours after breakfast her blood sugar is about 150.  The patient denies any problems with cardiac chest pain, dizziness, palpitations, orthopnea, or lower extremity edema.  The patient reports compliance with current medication- patient denies missing any  medication doses.      PAST MEDICAL HISTORY:  Past Medical History:   Diagnosis Date    AR (allergic rhinitis)     Breast cancer 06/2013    right; followed by oncology, Dr. Oliveira; s/p lumpectomy and  radiation    History of colonic polyps     Hyperlipidemia LDL goal < 100     Hypertension     Lower extremity edema     Mild chronic anemia     Obesity     Osteopenia     Sickle cell trait     Thyroid nodule     Type II or unspecified type diabetes mellitus without mention of complication, uncontrolled     Vitamin D deficiency disease        PAST SURGICAL HISTORY:  Past Surgical History:   Procedure Laterality Date    benign right breast biopsy  2012    lumpectomy right breast  6/2013    right ankle fracture repair      TOTAL ABDOMINAL HYSTERECTOMY      TOTAL THYROIDECTOMY  12/28/2016       SOCIAL HISTORY:  Social History     Socioeconomic History    Marital status:     Number of children: 3   Occupational History    Occupation: retired   Tobacco Use    Smoking status: Never Smoker    Smokeless tobacco: Never Used   Substance and Sexual Activity    Alcohol use: No       FAMILY HISTORY:  Family  History   Problem Relation Age of Onset    Diabetes Mother     Hypertension Mother     Hypertension Father     Prostate cancer Father     Diabetes Brother     Cancer Neg Hx     Breast cancer Neg Hx     Colon cancer Neg Hx     Ovarian cancer Neg Hx     Amblyopia Neg Hx     Blindness Neg Hx     Cataracts Neg Hx     Glaucoma Neg Hx     Macular degeneration Neg Hx     Retinal detachment Neg Hx     Strabismus Neg Hx     Stroke Neg Hx     Thyroid disease Neg Hx        ALLERGIES AND MEDICATIONS: updated and reviewed.  Review of patient's allergies indicates:   Allergen Reactions    Fosamax [alendronate] Other (See Comments)     - 'funny feeling'    Ace inhibitors Other (See Comments)     cough    Penicillins Hives     Medication List with Changes/Refills   Current Medications    BLOOD SUGAR DIAGNOSTIC (TRUE METRIX GLUCOSE TEST STRIP) STRP    Use to test blood sugar bid and prn.    BLOOD-GLUCOSE METER (TRUE METRIX GLUCOSE METER) KIT    Use to test bid and prn    CALCIUM CARBONATE-VITAMIN D3 600 MG-5 MCG (200 UNIT) CAP    Take 1 tablet by mouth every morning.     FLASH GLUCOSE SCANNING READER (FREESTYLE LESLIE 10 DAY READER) MISC    Use as directed to check blood sugar tid and prn.    FLASH GLUCOSE SENSOR (FREESTYLE LESLIE 10 DAY SENSOR) KIT    1 Units by Misc.(Non-Drug; Combo Route) route 3 (three) times daily with meals.    GLIPIZIDE (GLUCOTROL) 10 MG TR24    Take 1 tablet by mouth twice daily    HYDROCHLOROTHIAZIDE (HYDRODIURIL) 25 MG TABLET    Take 1 tablet (25 mg total) by mouth once daily.    LEVOTHYROXINE (EUTHYROX) 150 MCG TABLET    Take 1 tablet (150 mcg total) by mouth before breakfast.    LORATADINE (CLARITIN) 10 MG TABLET    Take 10 mg by mouth daily as needed.     LOSARTAN (COZAAR) 25 MG TABLET    TAKE 1 TABLET (25 MG TOTAL) BY MOUTH ONCE DAILY.    LOVASTATIN (MEVACOR) 20 MG TABLET    TAKE 1 TABLET (20 MG TOTAL) BY MOUTH ONCE DAILY.    METFORMIN (GLUCOPHAGE-XR) 500 MG ER 24HR TABLET    TAKE 2  "TABLETS TWICE DAILY WITH MEALS    PFIZER COVID-19 SHERI VACCN,PF, 30 MCG/0.3 ML INJECTION        TRADJENTA 5 MG TAB TABLET    Take 1 tablet by mouth once daily         CARE TEAM:  Patient Care Team:  Brenda Bailey MD as PCP - General (Internal Medicine)  Eun Frederick MD as Consulting Physician (Oncology)  Elsy Dubose MD as Consulting Physician (Endocrinology)  Angeles Cao LPN as Licensed Practical Nurse  Bobby Vallejo MD as Consulting Physician (Gastroenterology)         REVIEW OF SYSTEMS:  Review of Systems   Constitutional: Negative for chills, fatigue, fever and unexpected weight change.   HENT: Negative for congestion and postnasal drip.    Eyes: Negative for pain and visual disturbance.   Respiratory: Negative for cough, shortness of breath and wheezing.    Cardiovascular: Negative for chest pain, palpitations and leg swelling.   Gastrointestinal: Negative for abdominal pain, constipation, diarrhea, nausea and vomiting.   Genitourinary: Negative for dysuria.   Musculoskeletal: Negative for arthralgias and back pain.   Skin: Negative for rash.   Neurological: Negative for weakness and headaches.   Psychiatric/Behavioral: Negative for dysphoric mood and sleep disturbance. The patient is not nervous/anxious.          PHYSICAL EXAM: 274}  Vitals:    08/08/22 0841   BP: 128/70   Pulse: 67   Temp: 97.8 °F (36.6 °C)     Weight: 102.3 kg (225 lb 6.7 oz)   Height: 5' 7" (170.2 cm)   Body mass index is 35.31 kg/m².      General appearance - alert, well appearing, and in no distress, obese  Psychiatric - alert, oriented to person, place, and time, normal behavior, speech, dress, motor activity and thought process  Eyes - pupils equal and reactive, extraocular eye movements intact, sclera anicteric  Mouth - not examined  Chest - clear to auscultation, no wheezes, rales or rhonchi, symmetric air entry  Heart - normal rate and regular rhythm, no gallops noted  Neurological - alert, normal " speech, no focal findings, cranial nerves II through XII intact  Musculoskeletal - patient noted to have Mild-Moderate osteoarthritic changes to both knee joints. No joint effusions noted., no muscular tenderness noted  Extremities - no pedal edema noted  Skin - normal coloration, no suspicious skin lesions      Labs:  Lab Results   Component Value Date    HGBA1C 7.2 (H) 08/03/2022    HGBA1C 7.6 (H) 01/25/2022    HGBA1C 9.5 (H) 10/11/2021      Lab Results   Component Value Date    CHOL 145 08/03/2022    CHOL 138 07/10/2021    CHOL 138 07/10/2021     Lab Results   Component Value Date    LDLCALC 70.2 08/03/2022    LDLCALC 65.0 07/10/2021    LDLCALC 65.0 07/10/2021         ASSESSMENT AND PLAN:  274}  1. Uncontrolled type 2 diabetes mellitus with hyperglycemia  Lab Results   Component Value Date    HGBA1C 7.2 (H) 08/03/2022     Diabetes is under fair control at this time (due to hyperglycemia) for age and comorbid conditions. There has been improvement with dietary changes.  We discussed diabetic diet and regular exercise.   We discussed home blood sugar monitoring, if appropriate - the patient should test once to twice daily and as needed.   Continue current medication regimen. Recheck A1c in 6 months.  Diabetic complications addressed: Not applicable.  Patient was counseled on the need for yearly eye exam to screen for/monitor diabetic retinopathy and yearly diabetic foot exam.    2. Essential hypertension  The current medical regimen is effective;  continue present plan and medications. Recommended patient to check home readings to monitor and see me for followup as scheduled or sooner as needed.   Discussed sodium restriction, maintaining ideal body weight and regular exercise program as physiologic means to continue to achieve blood pressure control in addition to medication compliance.  Patient was educated that both decongestant and anti-inflammatory medication may raise blood pressure.  The patient is not  active on the digital hypertension program.    3. Hyperlipidemia with target LDL less than 100  Lab Results   Component Value Date    LDLCALC 70.2 08/03/2022     We discussed low fat diet and regular exercise.The current medical regimen is effective;  continue present plan and medications.     4. Hypothyroidism, postsurgical  Patient is clinically euthyroid. Continue current regimen.    5. Osteopenia after menopause  We discussed adequate calcium and vitamin D supplementation. We discussed fall precautions. She is up to date on her BMD. No need for prescription medication at this time.    6. HX: breast cancer  Up to date on surveillance. Followed by oncology.    7. Sickle cell trait  Stable. Asymptomatic. Observe.    8. Severe obesity (BMI 35.0-35.9 with comorbidity)  BMI Readings from Last 3 Encounters:   08/08/22 35.31 kg/m²   05/09/22 34.44 kg/m²   04/06/22 34.87 kg/m²     The patient is asked to continue to make an attempt to improve diet and exercise patterns to aid in medical management of this problem.     9. Need for shingles vaccine  Patient was advised to get immunization at the pharmacy.         No orders of the defined types were placed in this encounter.     Follow up in about 6 months (around 2/8/2023), or if symptoms worsen or fail to improve, for annual exam. or sooner as needed.

## 2022-08-10 ENCOUNTER — PES CALL (OUTPATIENT)
Dept: ADMINISTRATIVE | Facility: CLINIC | Age: 67
End: 2022-08-10
Payer: MEDICARE

## 2022-08-15 DIAGNOSIS — E11.65 UNCONTROLLED TYPE 2 DIABETES MELLITUS WITH HYPERGLYCEMIA: ICD-10-CM

## 2022-08-15 NOTE — TELEPHONE ENCOUNTER
No new care gaps identified.  [facility] Embedded Care Gaps. Reference number: [id number]. 8/15/2022   2:25:44 PM CDT

## 2022-08-16 RX ORDER — METFORMIN HYDROCHLORIDE 500 MG/1
TABLET, EXTENDED RELEASE ORAL
Qty: 360 TABLET | Refills: 1 | Status: SHIPPED | OUTPATIENT
Start: 2022-08-16 | End: 2023-04-03

## 2022-08-16 NOTE — TELEPHONE ENCOUNTER
Refill Decision Note   Masha Garcia  is requesting a refill authorization.  Brief Assessment and Rationale for Refill:  Approve     Medication Therapy Plan:       Medication Reconciliation Completed: No   Comments:     No Care Gaps recommended.     Note composed:11:23 AM 08/16/2022

## 2022-08-23 DIAGNOSIS — I10 ESSENTIAL HYPERTENSION: ICD-10-CM

## 2022-08-23 RX ORDER — HYDROCHLOROTHIAZIDE 25 MG/1
25 TABLET ORAL DAILY
Qty: 90 TABLET | Refills: 1 | Status: SHIPPED | OUTPATIENT
Start: 2022-08-23 | End: 2023-04-04

## 2022-08-23 NOTE — TELEPHONE ENCOUNTER
Refill Routing Note   Medication(s) are not appropriate for processing by [facility] for the following reason(s):      - Required laboratory values are abnormal    [facility] action(s):  Defer Medication-related problems identified: Requires labs        Medication reconciliation completed: No     Appointments  past 12m or future 3m with PCP    Date Provider   Last Visit   8/8/2022 Brenda Bailey MD   Next Visit   2/8/2023 Brenda Bailey MD   ED visits in past 90 days: 0        Note composed:1:54 PM 08/23/2022

## 2022-08-23 NOTE — TELEPHONE ENCOUNTER
Care Due:                  Date            Visit Type   Department     Provider  --------------------------------------------------------------------------------                                [facility]                              PRIMARY      [facility]  Last Visit: 08-      CARE ([facility])   MED/ PEDS      Melissa Bailey                              [facility]/ INTERNAL  [facility]  Next Visit: 02-      CARE ([facility])   MED/ PEDS      Melissa Bailey                                                            Last  Test          Frequency    Reason                     Performed    Due Date  --------------------------------------------------------------------------------    TSH.........  12 months..  levothyroxine............  07- 07-    [employer] Embedded Care Gaps. Reference number: [id number]. 8/23/2022   1:43:50 PM CDT

## 2022-08-24 ENCOUNTER — PATIENT MESSAGE (OUTPATIENT)
Dept: ADMINISTRATIVE | Facility: HOSPITAL | Age: 67
End: 2022-08-24
Payer: MEDICARE

## 2022-08-25 ENCOUNTER — PATIENT OUTREACH (OUTPATIENT)
Dept: ADMINISTRATIVE | Facility: HOSPITAL | Age: 67
End: 2022-08-25
Payer: MEDICARE

## 2022-09-01 ENCOUNTER — TELEPHONE (OUTPATIENT)
Dept: FAMILY MEDICINE | Facility: CLINIC | Age: 67
End: 2022-09-01
Payer: MEDICARE

## 2022-09-01 NOTE — TELEPHONE ENCOUNTER
----- Message from Brenda Bailey MD sent at 8/29/2022 12:50 PM CDT -----  Regarding: diabetic eye exam  Please call patient to update her diabetic eye exam.

## 2022-09-20 ENCOUNTER — OFFICE VISIT (OUTPATIENT)
Dept: OPTOMETRY | Facility: CLINIC | Age: 67
End: 2022-09-20
Payer: COMMERCIAL

## 2022-09-20 DIAGNOSIS — H52.4 HYPEROPIA WITH PRESBYOPIA OF BOTH EYES: ICD-10-CM

## 2022-09-20 DIAGNOSIS — Z01.00 EYE EXAM, ROUTINE: Primary | ICD-10-CM

## 2022-09-20 DIAGNOSIS — E11.9 DIABETES MELLITUS TYPE 2 WITHOUT RETINOPATHY: ICD-10-CM

## 2022-09-20 DIAGNOSIS — H52.03 HYPEROPIA WITH PRESBYOPIA OF BOTH EYES: ICD-10-CM

## 2022-09-20 PROCEDURE — 92015 DETERMINE REFRACTIVE STATE: CPT | Mod: S$GLB,,, | Performed by: OPTOMETRIST

## 2022-09-20 PROCEDURE — 92014 COMPRE OPH EXAM EST PT 1/>: CPT | Mod: S$GLB,,, | Performed by: OPTOMETRIST

## 2022-09-20 PROCEDURE — 99499 UNLISTED E&M SERVICE: CPT | Mod: S$GLB,,, | Performed by: OPTOMETRIST

## 2022-09-20 PROCEDURE — 92014 PR EYE EXAM, EST PATIENT,COMPREHESV: ICD-10-PCS | Mod: S$GLB,,, | Performed by: OPTOMETRIST

## 2022-09-20 PROCEDURE — 99999 PR PBB SHADOW E&M-EST. PATIENT-LVL III: CPT | Mod: PBBFAC,,, | Performed by: OPTOMETRIST

## 2022-09-20 PROCEDURE — 99999 PR PBB SHADOW E&M-EST. PATIENT-LVL III: ICD-10-PCS | Mod: PBBFAC,,, | Performed by: OPTOMETRIST

## 2022-09-20 PROCEDURE — 92015 PR REFRACTION: ICD-10-PCS | Mod: S$GLB,,, | Performed by: OPTOMETRIST

## 2022-09-20 NOTE — PROGRESS NOTES
HPI    Diabetic  eye exam   DLS-12/15/20    EYEMEDS: None    Patient states her vision maybe the same  Pt denied flashes and floaters  Pt want a new rx for gl    (-) Dry Eyes  (-) Itching   (-) Burning   (-) Tearing   (-) Redness  (-) Eye pain  (-) Flashes  (-) Floaters   (-) Double vision   (-) Photophobia  (-) Glare   (-) Headaches     Hemoglobin A1C       Date                     Value               Ref Range             Status                08/03/2022               7.2 (H)             4.0 - 5.6 %           Final                 01/25/2022               7.6 (H)             4.0 - 5.6 %           Final                10/11/2021               9.5 (H)             4.0 - 5.6 %           Final               Last edited by Ray Lindsay, ABISAI on 9/20/2022 10:56 AM.            Assessment /Plan     For exam results, see Encounter Report.    Eye exam, routine  Diabetes mellitus type 2 without retinopathy  -Eyemed?  -No retinopathy noted today.  Continued control with primary care physician and annual comprehensive eye exam.    Hyperopia with presbyopia of both eyes  Eyeglass Final Rx       Eyeglass Final Rx         Sphere Cylinder Axis Dist VA Add    Right +1.75 Sphere  20/25 +2.50    Left +1.75 +0.75 010 20/25 +2.50      Type: PAL    Expiration Date: 9/20/2023                      RTC 1 yr

## 2022-09-30 ENCOUNTER — PATIENT MESSAGE (OUTPATIENT)
Dept: FAMILY MEDICINE | Facility: CLINIC | Age: 67
End: 2022-09-30
Payer: MEDICARE

## 2022-10-03 ENCOUNTER — LAB VISIT (OUTPATIENT)
Dept: LAB | Facility: HOSPITAL | Age: 67
End: 2022-10-03
Attending: INTERNAL MEDICINE
Payer: MEDICARE

## 2022-10-03 DIAGNOSIS — Z17.0 MALIGNANT NEOPLASM OF RIGHT BREAST IN FEMALE, ESTROGEN RECEPTOR POSITIVE, UNSPECIFIED SITE OF BREAST: ICD-10-CM

## 2022-10-03 DIAGNOSIS — C50.911 MALIGNANT NEOPLASM OF RIGHT BREAST IN FEMALE, ESTROGEN RECEPTOR POSITIVE, UNSPECIFIED SITE OF BREAST: ICD-10-CM

## 2022-10-03 DIAGNOSIS — D64.9 ANEMIA, UNSPECIFIED TYPE: ICD-10-CM

## 2022-10-03 LAB
ALBUMIN SERPL BCP-MCNC: 3.4 G/DL (ref 3.5–5.2)
ALP SERPL-CCNC: 60 U/L (ref 55–135)
ALT SERPL W/O P-5'-P-CCNC: 13 U/L (ref 10–44)
ANION GAP SERPL CALC-SCNC: 14 MMOL/L (ref 8–16)
AST SERPL-CCNC: 14 U/L (ref 10–40)
BASOPHILS # BLD AUTO: 0.05 K/UL (ref 0–0.2)
BASOPHILS NFR BLD: 0.5 % (ref 0–1.9)
BILIRUB SERPL-MCNC: 0.5 MG/DL (ref 0.1–1)
BUN SERPL-MCNC: 12 MG/DL (ref 8–23)
CALCIUM SERPL-MCNC: 9.8 MG/DL (ref 8.7–10.5)
CHLORIDE SERPL-SCNC: 105 MMOL/L (ref 95–110)
CO2 SERPL-SCNC: 23 MMOL/L (ref 23–29)
CREAT SERPL-MCNC: 1 MG/DL (ref 0.5–1.4)
DIFFERENTIAL METHOD: ABNORMAL
EOSINOPHIL # BLD AUTO: 0.1 K/UL (ref 0–0.5)
EOSINOPHIL NFR BLD: 1.1 % (ref 0–8)
ERYTHROCYTE [DISTWIDTH] IN BLOOD BY AUTOMATED COUNT: 15.5 % (ref 11.5–14.5)
EST. GFR  (NO RACE VARIABLE): >60 ML/MIN/1.73 M^2
FERRITIN SERPL-MCNC: 56 NG/ML (ref 20–300)
GLUCOSE SERPL-MCNC: 222 MG/DL (ref 70–110)
HCT VFR BLD AUTO: 31.2 % (ref 37–48.5)
HGB BLD-MCNC: 10.3 G/DL (ref 12–16)
IMM GRANULOCYTES # BLD AUTO: 0.08 K/UL (ref 0–0.04)
IMM GRANULOCYTES NFR BLD AUTO: 0.8 % (ref 0–0.5)
IRON SERPL-MCNC: 72 UG/DL (ref 30–160)
LYMPHOCYTES # BLD AUTO: 3 K/UL (ref 1–4.8)
LYMPHOCYTES NFR BLD: 28.9 % (ref 18–48)
MCH RBC QN AUTO: 27.5 PG (ref 27–31)
MCHC RBC AUTO-ENTMCNC: 33 G/DL (ref 32–36)
MCV RBC AUTO: 83 FL (ref 82–98)
MONOCYTES # BLD AUTO: 0.4 K/UL (ref 0.3–1)
MONOCYTES NFR BLD: 4 % (ref 4–15)
NEUTROPHILS # BLD AUTO: 6.7 K/UL (ref 1.8–7.7)
NEUTROPHILS NFR BLD: 64.7 % (ref 38–73)
NRBC BLD-RTO: 0 /100 WBC
PLATELET # BLD AUTO: 270 K/UL (ref 150–450)
PMV BLD AUTO: 11.9 FL (ref 9.2–12.9)
POTASSIUM SERPL-SCNC: 3.7 MMOL/L (ref 3.5–5.1)
PROT SERPL-MCNC: 7 G/DL (ref 6–8.4)
RBC # BLD AUTO: 3.75 M/UL (ref 4–5.4)
SATURATED IRON: 22 % (ref 20–50)
SODIUM SERPL-SCNC: 142 MMOL/L (ref 136–145)
TOTAL IRON BINDING CAPACITY: 330 UG/DL (ref 250–450)
TRANSFERRIN SERPL-MCNC: 223 MG/DL (ref 200–375)
WBC # BLD AUTO: 10.35 K/UL (ref 3.9–12.7)

## 2022-10-03 PROCEDURE — 84466 ASSAY OF TRANSFERRIN: CPT | Performed by: INTERNAL MEDICINE

## 2022-10-03 PROCEDURE — 82728 ASSAY OF FERRITIN: CPT

## 2022-10-03 PROCEDURE — 85025 COMPLETE CBC W/AUTO DIFF WBC: CPT | Performed by: INTERNAL MEDICINE

## 2022-10-03 PROCEDURE — 36415 COLL VENOUS BLD VENIPUNCTURE: CPT | Mod: PO | Performed by: INTERNAL MEDICINE

## 2022-10-03 PROCEDURE — 80053 COMPREHEN METABOLIC PANEL: CPT | Performed by: INTERNAL MEDICINE

## 2022-10-10 ENCOUNTER — OFFICE VISIT (OUTPATIENT)
Dept: HEMATOLOGY/ONCOLOGY | Facility: CLINIC | Age: 67
End: 2022-10-10
Payer: MEDICARE

## 2022-10-10 VITALS
OXYGEN SATURATION: 98 % | WEIGHT: 221.81 LBS | BODY MASS INDEX: 34.81 KG/M2 | HEART RATE: 71 BPM | SYSTOLIC BLOOD PRESSURE: 137 MMHG | DIASTOLIC BLOOD PRESSURE: 71 MMHG | HEIGHT: 67 IN | TEMPERATURE: 99 F

## 2022-10-10 DIAGNOSIS — Z17.0 MALIGNANT NEOPLASM OF RIGHT BREAST IN FEMALE, ESTROGEN RECEPTOR POSITIVE, UNSPECIFIED SITE OF BREAST: ICD-10-CM

## 2022-10-10 DIAGNOSIS — N95.1 HOT FLASHES DUE TO MENOPAUSE: ICD-10-CM

## 2022-10-10 DIAGNOSIS — C50.911 MALIGNANT NEOPLASM OF RIGHT BREAST IN FEMALE, ESTROGEN RECEPTOR POSITIVE, UNSPECIFIED SITE OF BREAST: ICD-10-CM

## 2022-10-10 DIAGNOSIS — D64.9 ANEMIA, UNSPECIFIED TYPE: Primary | ICD-10-CM

## 2022-10-10 PROCEDURE — 3066F NEPHROPATHY DOC TX: CPT | Mod: CPTII,S$GLB,, | Performed by: INTERNAL MEDICINE

## 2022-10-10 PROCEDURE — 1101F PT FALLS ASSESS-DOCD LE1/YR: CPT | Mod: CPTII,S$GLB,, | Performed by: INTERNAL MEDICINE

## 2022-10-10 PROCEDURE — 99214 OFFICE O/P EST MOD 30 MIN: CPT | Mod: S$GLB,,, | Performed by: INTERNAL MEDICINE

## 2022-10-10 PROCEDURE — 3078F PR MOST RECENT DIASTOLIC BLOOD PRESSURE < 80 MM HG: ICD-10-PCS | Mod: CPTII,S$GLB,, | Performed by: INTERNAL MEDICINE

## 2022-10-10 PROCEDURE — 3288F FALL RISK ASSESSMENT DOCD: CPT | Mod: CPTII,S$GLB,, | Performed by: INTERNAL MEDICINE

## 2022-10-10 PROCEDURE — 4010F PR ACE/ARB THEARPY RXD/TAKEN: ICD-10-PCS | Mod: CPTII,S$GLB,, | Performed by: INTERNAL MEDICINE

## 2022-10-10 PROCEDURE — 3051F HG A1C>EQUAL 7.0%<8.0%: CPT | Mod: CPTII,S$GLB,, | Performed by: INTERNAL MEDICINE

## 2022-10-10 PROCEDURE — 3051F PR MOST RECENT HEMOGLOBIN A1C LEVEL 7.0 - < 8.0%: ICD-10-PCS | Mod: CPTII,S$GLB,, | Performed by: INTERNAL MEDICINE

## 2022-10-10 PROCEDURE — 3060F PR POS MICROALBUMINURIA RESULT DOCUMENTED/REVIEW: ICD-10-PCS | Mod: CPTII,S$GLB,, | Performed by: INTERNAL MEDICINE

## 2022-10-10 PROCEDURE — 1159F PR MEDICATION LIST DOCUMENTED IN MEDICAL RECORD: ICD-10-PCS | Mod: CPTII,S$GLB,, | Performed by: INTERNAL MEDICINE

## 2022-10-10 PROCEDURE — 99999 PR PBB SHADOW E&M-EST. PATIENT-LVL IV: CPT | Mod: PBBFAC,,, | Performed by: INTERNAL MEDICINE

## 2022-10-10 PROCEDURE — 1159F MED LIST DOCD IN RCRD: CPT | Mod: CPTII,S$GLB,, | Performed by: INTERNAL MEDICINE

## 2022-10-10 PROCEDURE — 3060F POS MICROALBUMINURIA REV: CPT | Mod: CPTII,S$GLB,, | Performed by: INTERNAL MEDICINE

## 2022-10-10 PROCEDURE — 3075F PR MOST RECENT SYSTOLIC BLOOD PRESS GE 130-139MM HG: ICD-10-PCS | Mod: CPTII,S$GLB,, | Performed by: INTERNAL MEDICINE

## 2022-10-10 PROCEDURE — 3008F PR BODY MASS INDEX (BMI) DOCUMENTED: ICD-10-PCS | Mod: CPTII,S$GLB,, | Performed by: INTERNAL MEDICINE

## 2022-10-10 PROCEDURE — 3078F DIAST BP <80 MM HG: CPT | Mod: CPTII,S$GLB,, | Performed by: INTERNAL MEDICINE

## 2022-10-10 PROCEDURE — 1101F PR PT FALLS ASSESS DOC 0-1 FALLS W/OUT INJ PAST YR: ICD-10-PCS | Mod: CPTII,S$GLB,, | Performed by: INTERNAL MEDICINE

## 2022-10-10 PROCEDURE — 4010F ACE/ARB THERAPY RXD/TAKEN: CPT | Mod: CPTII,S$GLB,, | Performed by: INTERNAL MEDICINE

## 2022-10-10 PROCEDURE — 99214 PR OFFICE/OUTPT VISIT, EST, LEVL IV, 30-39 MIN: ICD-10-PCS | Mod: S$GLB,,, | Performed by: INTERNAL MEDICINE

## 2022-10-10 PROCEDURE — 1126F AMNT PAIN NOTED NONE PRSNT: CPT | Mod: CPTII,S$GLB,, | Performed by: INTERNAL MEDICINE

## 2022-10-10 PROCEDURE — 3066F PR DOCUMENTATION OF TREATMENT FOR NEPHROPATHY: ICD-10-PCS | Mod: CPTII,S$GLB,, | Performed by: INTERNAL MEDICINE

## 2022-10-10 PROCEDURE — 3008F BODY MASS INDEX DOCD: CPT | Mod: CPTII,S$GLB,, | Performed by: INTERNAL MEDICINE

## 2022-10-10 PROCEDURE — 99999 PR PBB SHADOW E&M-EST. PATIENT-LVL IV: ICD-10-PCS | Mod: PBBFAC,,, | Performed by: INTERNAL MEDICINE

## 2022-10-10 PROCEDURE — 1126F PR PAIN SEVERITY QUANTIFIED, NO PAIN PRESENT: ICD-10-PCS | Mod: CPTII,S$GLB,, | Performed by: INTERNAL MEDICINE

## 2022-10-10 PROCEDURE — 3075F SYST BP GE 130 - 139MM HG: CPT | Mod: CPTII,S$GLB,, | Performed by: INTERNAL MEDICINE

## 2022-10-10 PROCEDURE — 3288F PR FALLS RISK ASSESSMENT DOCUMENTED: ICD-10-PCS | Mod: CPTII,S$GLB,, | Performed by: INTERNAL MEDICINE

## 2022-10-10 NOTE — PROGRESS NOTES
Chief Complaint :   Breast Cancer    Hx of Present illness :  Patient is a 67 y.o. year old female who presents to the clinic  for oncology followup. dx'd to have Carcinoma Right Breast  In . Lumpectomy and radiation therapy followed by adjuvant Arimidex therapy. Breast cancer Index did not show  Any benefit with extended harmonal therapy.  Completed arimidex therapy in 2018. Since last visit has had colonoscopic exam on 19 : documented to have a single sessile polyp in the ascending colon. polypectpomy done. Documented to have Grade 2 Internal hemmorrhoids.   Completed Covid vaccination.      Interval Hx: Former pt of Dr. Oliveira's   She completed 5yr of AI   No new issues  Doing well  Appetite and weight stable  No SOB/CP  No cough  She reports mild hot flashes-stable    Allergies :    Review of patient's allergies indicates:   Allergen Reactions    Ace inhibitors Other (See Comments)     cough    Penicillins Hives       Occupation : retired .    Transfusion :  None    Menstrual & obstetric Hx :  3; para 3.  Age of menarche: 12  Age of first pregnancy: 18  Lactation history: None  Age of menopause:  Hysterectomy in her late 40's for bleeding  HRT:  For many years.    Present Meds :   Medication List with Changes/Refills   Current Medications    BLOOD SUGAR DIAGNOSTIC (TRUE METRIX GLUCOSE TEST STRIP) STRP    Use to test blood sugar bid and prn.    BLOOD-GLUCOSE METER (TRUE METRIX GLUCOSE METER) KIT    Use to test BID and PRN    CALCIUM CARBONATE-VITAMIN D3 600 MG-5 MCG (200 UNIT) CAP    Take 1 tablet by mouth every morning.     FLASH GLUCOSE SCANNING READER (FREESTYLE LESLIE 10 DAY READER) MISC    Use as directed to check blood sugar tid and prn.    FLASH GLUCOSE SENSOR (FREESTYLE LESLIE 10 DAY SENSOR) KIT    1 Units by Misc.(Non-Drug; Combo Route) route 3 (three) times daily with meals.    GLIPIZIDE (GLUCOTROL) 10 MG TR24    Take 1 tablet by mouth twice daily     HYDROCHLOROTHIAZIDE (HYDRODIURIL) 25 MG TABLET    TAKE 1 TABLET (25 MG TOTAL) BY MOUTH ONCE DAILY.    LEVOTHYROXINE (SYNTHROID) 150 MCG TABLET    TAKE 1 TABLET BY MOUTH ONCE DAILY BEFORE  BREAKFAST    LINAGLIPTIN (TRADJENTA) 5 MG TAB TABLET    Take 1 tablet by mouth once daily    LORATADINE (CLARITIN) 10 MG TABLET    Take 10 mg by mouth daily as needed.     LOSARTAN (COZAAR) 25 MG TABLET    TAKE 1 TABLET (25 MG TOTAL) BY MOUTH ONCE DAILY.    LOVASTATIN (MEVACOR) 20 MG TABLET    TAKE 1 TABLET (20 MG TOTAL) BY MOUTH ONCE DAILY.    METFORMIN (GLUCOPHAGE-XR) 500 MG ER 24HR TABLET    TAKE 2 TABLETS TWICE DAILY WITH MEALS       Past Medical Hx :  Carcinoma right breast. Diabetes Mellitus type 2; Hypertension; hyperlipidemia; Allergic rhinitis; osteopenia; sickle cell trait; Thyroid nodule. No Hx of hepatitis, TB, Sarcoid, Lupus, rheumatoid arthritis, seizure disorder, CVA, PE or DVT. No prior Hx of Chemotherapy    Past Medical Hx :  Past Medical History:   Diagnosis Date    AR (allergic rhinitis)     Breast cancer 06/2013    right; followed by oncology, Dr. Oliveira; s/p lumpectomy and  radiation    History of colonic polyps     Hyperlipidemia LDL goal < 100     Hypertension     Lower extremity edema     Mild chronic anemia     Obesity     Osteopenia     Sickle cell trait     Thyroid nodule     Type II or unspecified type diabetes mellitus without mention of complication, uncontrolled     Vitamin D deficiency disease        Travel Hx :  N/A    Immunization :  Immunization History   Administered Date(s) Administered    COVID-19, MRNA, LN-S, PF (Pfizer) (Gray Cap) 01/07/2022    COVID-19, MRNA, LN-S, PF (Pfizer) (Purple Cap) 02/19/2021, 03/12/2021    Influenza (FLUAD) - Quadrivalent - Adjuvanted - PF *Preferred* (65+) 10/13/2020, 12/02/2021    Influenza - Quadrivalent 10/08/2014    Influenza - Quadrivalent - PF *Preferred* (6 months and older) 11/26/2012, 01/10/2014, 09/28/2016, 12/06/2017, 12/12/2018, 02/04/2020    Influenza  Split 11/26/2012, 01/10/2014    Pneumococcal Conjugate - 13 Valent 05/04/2016    Pneumococcal Polysaccharide - 23 Valent 08/13/2008, 12/06/2017    Tdap 07/24/2009       Family Hx : reviewed  Family History   Problem Relation Age of Onset    Diabetes Mother     Hypertension Mother     Hypertension Father     Prostate cancer Father     Diabetes Brother     Cancer Neg Hx     Breast cancer Neg Hx     Colon cancer Neg Hx     Ovarian cancer Neg Hx     Amblyopia Neg Hx     Blindness Neg Hx     Cataracts Neg Hx     Glaucoma Neg Hx     Macular degeneration Neg Hx     Retinal detachment Neg Hx     Strabismus Neg Hx     Stroke Neg Hx     Thyroid disease Neg Hx        Social Hx : Reviewed  Social History     Socioeconomic History    Marital status:     Number of children: 3   Occupational History    Occupation: retired   Tobacco Use    Smoking status: Never    Smokeless tobacco: Never   Substance and Sexual Activity    Alcohol use: No       Surgery :  Right Breast Bx; Lumpectomy Right; Tubal ligation; Complete Hysterectomy; Thyroidectomy.  Colonoscopy in 2014.  EGD in  Aug 2018  Colonoscopy 07/02/1`9      Review of Systems   Constitutional:  Negative for fever, malaise/fatigue and weight loss.   HENT:  Negative for congestion, hearing loss, nosebleeds and sore throat.    Eyes:  Negative for blurred vision.        Wears Glasses   Respiratory:  Negative for cough and shortness of breath.    Cardiovascular:  Negative for chest pain and leg swelling.   Gastrointestinal:  Negative for abdominal pain, blood in stool, constipation, diarrhea, heartburn, nausea and vomiting.   Genitourinary:  Negative for dysuria, hematuria and urgency.   Musculoskeletal:  Negative for back pain and neck pain.   Skin:  Negative for itching and rash.   Neurological:  Negative for dizziness, tingling and headaches.   Endo/Heme/Allergies:  Does not bruise/bleed easily.        Mild hot flashes   Psychiatric/Behavioral:  Negative for depression and  memory loss. The patient is nervous/anxious (Sometimes) and has insomnia.      Physical Exam :    Physical Exam   Constitutional: She is oriented to person, place, and time. She appears well-developed and well-nourished.   HENT:   Head: Normocephalic.   Mouth/Throat: Oropharynx is clear and moist.   Eyes: Conjunctivae and lids are normal.No scleral icterus.   Neck: Normal range of motion. Neck supple. No thyromegaly present.   Cardiovascular: Normal rate, regular rhythm and normal heart sounds.    No murmur heard.  Pulmonary/Chest: Breath sounds normal. She has no wheezes. She has no rales.   Abdominal: Soft. Bowel sounds are normal. There is no tenderness. There is no rebound and no guarding.   Musculoskeletal: Normal range of motion. She exhibits no edema and no tenderness.   Lymphadenopathy:     She has no cervical adenopathy.     She has no axillary adenopathy.        Right: No supraclavicular adenopathy present.        Left: No supraclavicular adenopathy present.   Neurological: She is alert and oriented to person, place, and time. No cranial nerve deficit. Coordination normal.   Skin: Skin is warm and dry. No ecchymosis, no petechiae and no rash noted. No erythema.   Psychiatric: She has a normal mood and affect.       Labs & Imaging :   Lab Results   Component Value Date    WBC 10.35 10/03/2022    HGB 10.3 (L) 10/03/2022    HCT 31.2 (L) 10/03/2022    MCV 83 10/03/2022     10/03/2022           05/05/21 : Hgb 10.9; hct 32.6; MCV 84; platelets 257,000. ANC 7,000.  NFBS 161; cr.0.8; ca 9.7; Bili 0.6; Liver enzymes normal  CEA < 1.0;  CA 27.29 antigen : 23.2          12/30/2021 :  Hgb 10.6; Hct 31.4; MCV 82; Platelets 281,000. ANC 6,400.     NFBS 136; Cr.0.8; Ca 9.5; Bili 0.6. Liver enzymea normal. eGFR > 60.   CEA < 1.0; CA 27.29 antigen : 27.5           08/28/2020 : Hgb 10.8; hct 33; MCV 85; Platelets 252,000 ANC 7,700. ;      NFBS 167; Cr.0.9; Ca 10.2; bili 0.6; Liver enzymes normal.       eGFR >  60.  CEA < 1.0. CA 27.29 antigen normal.        12/16/19 : Hgb 10.9; Hct 32.3; MCV 85; Platelets 276,000 ANC 7,700. Serum iron 49; TIBC 311; sat 16 %; NFBS 128; Cr.0.8; ca 10.2; bili 0.6  Liver enzymes normal. eFR . 60.     Mammo 7/7/21 ACR BI-RADS Category 2 - Benign Findings       Lab Results   Component Value Date    WBC 10.35 10/03/2022    HGB 10.3 (L) 10/03/2022    HCT 31.2 (L) 10/03/2022    MCV 83 10/03/2022     10/03/2022       1. History of breast cancer    2. Anemia, unspecified type    3. Hot flashes due to menopause              Assessment & Plan:    Reviewed labs  with patient.   Carcinoma Right Breast  In 2013. Lumpectomy and radiation therapy followed by adjuvant Arimidex therapy.   Breast cancer Index did not show  Any benefit with extended harmonal therapy.   She completed five years of Arimidex in 2018  Mammo 7/14/22  ACR BI-RADS Category 2 - Benign Findings   Doing well   Cont to monitor    2. Hb 10.3 g/dL -stable  Colonoscopy 2019- benign polyp ascending colon  Fe studies okay except for borderline Fe sats  3.  Pt declines Clonidine  Pt declines Acupuncture Referral         Cbc, Ferritin, TIBC and Fe prior to f/u  4mos

## 2023-01-31 ENCOUNTER — TELEPHONE (OUTPATIENT)
Dept: FAMILY MEDICINE | Facility: CLINIC | Age: 68
End: 2023-01-31
Payer: MEDICARE

## 2023-02-06 ENCOUNTER — LAB VISIT (OUTPATIENT)
Dept: LAB | Facility: HOSPITAL | Age: 68
End: 2023-02-06
Attending: INTERNAL MEDICINE
Payer: MEDICARE

## 2023-02-06 DIAGNOSIS — E78.5 HYPERLIPIDEMIA WITH TARGET LDL LESS THAN 100: ICD-10-CM

## 2023-02-06 DIAGNOSIS — E11.65 UNCONTROLLED TYPE 2 DIABETES MELLITUS WITH HYPERGLYCEMIA: ICD-10-CM

## 2023-02-06 DIAGNOSIS — E89.0 HYPOTHYROIDISM, POSTSURGICAL: ICD-10-CM

## 2023-02-06 DIAGNOSIS — I10 ESSENTIAL HYPERTENSION: ICD-10-CM

## 2023-02-06 LAB
ALBUMIN SERPL BCP-MCNC: 3.5 G/DL (ref 3.5–5.2)
ALP SERPL-CCNC: 58 U/L (ref 55–135)
ALT SERPL W/O P-5'-P-CCNC: 12 U/L (ref 10–44)
ANION GAP SERPL CALC-SCNC: 14 MMOL/L (ref 8–16)
ANION GAP SERPL CALC-SCNC: 14 MMOL/L (ref 8–16)
AST SERPL-CCNC: 13 U/L (ref 10–40)
BASOPHILS # BLD AUTO: 0.07 K/UL (ref 0–0.2)
BASOPHILS NFR BLD: 0.7 % (ref 0–1.9)
BILIRUB SERPL-MCNC: 0.4 MG/DL (ref 0.1–1)
BUN SERPL-MCNC: 15 MG/DL (ref 8–23)
BUN SERPL-MCNC: 15 MG/DL (ref 8–23)
CALCIUM SERPL-MCNC: 10.3 MG/DL (ref 8.7–10.5)
CALCIUM SERPL-MCNC: 10.3 MG/DL (ref 8.7–10.5)
CHLORIDE SERPL-SCNC: 105 MMOL/L (ref 95–110)
CHLORIDE SERPL-SCNC: 105 MMOL/L (ref 95–110)
CO2 SERPL-SCNC: 21 MMOL/L (ref 23–29)
CO2 SERPL-SCNC: 21 MMOL/L (ref 23–29)
CREAT SERPL-MCNC: 0.9 MG/DL (ref 0.5–1.4)
CREAT SERPL-MCNC: 0.9 MG/DL (ref 0.5–1.4)
DIFFERENTIAL METHOD: ABNORMAL
EOSINOPHIL # BLD AUTO: 0.1 K/UL (ref 0–0.5)
EOSINOPHIL NFR BLD: 1.2 % (ref 0–8)
ERYTHROCYTE [DISTWIDTH] IN BLOOD BY AUTOMATED COUNT: 14.7 % (ref 11.5–14.5)
EST. GFR  (NO RACE VARIABLE): >60 ML/MIN/1.73 M^2
EST. GFR  (NO RACE VARIABLE): >60 ML/MIN/1.73 M^2
ESTIMATED AVG GLUCOSE: 180 MG/DL (ref 68–131)
GLUCOSE SERPL-MCNC: 187 MG/DL (ref 70–110)
GLUCOSE SERPL-MCNC: 187 MG/DL (ref 70–110)
HBA1C MFR BLD: 7.9 % (ref 4–5.6)
HCT VFR BLD AUTO: 33.3 % (ref 37–48.5)
HGB BLD-MCNC: 10.7 G/DL (ref 12–16)
IMM GRANULOCYTES # BLD AUTO: 0.05 K/UL (ref 0–0.04)
IMM GRANULOCYTES NFR BLD AUTO: 0.5 % (ref 0–0.5)
LYMPHOCYTES # BLD AUTO: 3 K/UL (ref 1–4.8)
LYMPHOCYTES NFR BLD: 29.9 % (ref 18–48)
MCH RBC QN AUTO: 27 PG (ref 27–31)
MCHC RBC AUTO-ENTMCNC: 32.1 G/DL (ref 32–36)
MCV RBC AUTO: 84 FL (ref 82–98)
MONOCYTES # BLD AUTO: 0.4 K/UL (ref 0.3–1)
MONOCYTES NFR BLD: 4.4 % (ref 4–15)
NEUTROPHILS # BLD AUTO: 6.4 K/UL (ref 1.8–7.7)
NEUTROPHILS NFR BLD: 63.3 % (ref 38–73)
NRBC BLD-RTO: 0 /100 WBC
PLATELET # BLD AUTO: 287 K/UL (ref 150–450)
PMV BLD AUTO: 12 FL (ref 9.2–12.9)
POTASSIUM SERPL-SCNC: 3.5 MMOL/L (ref 3.5–5.1)
POTASSIUM SERPL-SCNC: 3.5 MMOL/L (ref 3.5–5.1)
PROT SERPL-MCNC: 7.3 G/DL (ref 6–8.4)
RBC # BLD AUTO: 3.96 M/UL (ref 4–5.4)
SODIUM SERPL-SCNC: 140 MMOL/L (ref 136–145)
SODIUM SERPL-SCNC: 140 MMOL/L (ref 136–145)
TSH SERPL DL<=0.005 MIU/L-ACNC: 0.95 UIU/ML (ref 0.4–4)
WBC # BLD AUTO: 10.09 K/UL (ref 3.9–12.7)

## 2023-02-06 PROCEDURE — 80053 COMPREHEN METABOLIC PANEL: CPT | Mod: HCNC | Performed by: INTERNAL MEDICINE

## 2023-02-06 PROCEDURE — 83036 HEMOGLOBIN GLYCOSYLATED A1C: CPT | Mod: HCNC | Performed by: INTERNAL MEDICINE

## 2023-02-06 PROCEDURE — 84443 ASSAY THYROID STIM HORMONE: CPT | Mod: HCNC | Performed by: INTERNAL MEDICINE

## 2023-02-06 PROCEDURE — 85025 COMPLETE CBC W/AUTO DIFF WBC: CPT | Mod: HCNC | Performed by: INTERNAL MEDICINE

## 2023-02-06 PROCEDURE — 36415 COLL VENOUS BLD VENIPUNCTURE: CPT | Mod: HCNC,PO | Performed by: INTERNAL MEDICINE

## 2023-02-08 ENCOUNTER — OFFICE VISIT (OUTPATIENT)
Dept: FAMILY MEDICINE | Facility: CLINIC | Age: 68
End: 2023-02-08
Payer: MEDICARE

## 2023-02-08 VITALS
DIASTOLIC BLOOD PRESSURE: 66 MMHG | WEIGHT: 221.44 LBS | BODY MASS INDEX: 34.75 KG/M2 | HEIGHT: 67 IN | SYSTOLIC BLOOD PRESSURE: 112 MMHG | HEART RATE: 68 BPM | TEMPERATURE: 98 F | OXYGEN SATURATION: 95 %

## 2023-02-08 DIAGNOSIS — E66.01 SEVERE OBESITY (BMI 35.0-35.9 WITH COMORBIDITY): ICD-10-CM

## 2023-02-08 DIAGNOSIS — Z78.0 OSTEOPENIA AFTER MENOPAUSE: ICD-10-CM

## 2023-02-08 DIAGNOSIS — Z85.3 HX: BREAST CANCER: ICD-10-CM

## 2023-02-08 DIAGNOSIS — E78.5 HYPERLIPIDEMIA WITH TARGET LDL LESS THAN 100: ICD-10-CM

## 2023-02-08 DIAGNOSIS — Z23 NEED FOR TD VACCINE: ICD-10-CM

## 2023-02-08 DIAGNOSIS — E11.65 UNCONTROLLED TYPE 2 DIABETES MELLITUS WITH HYPERGLYCEMIA: ICD-10-CM

## 2023-02-08 DIAGNOSIS — Z23 NEED FOR STREPTOCOCCUS PNEUMONIAE VACCINATION: ICD-10-CM

## 2023-02-08 DIAGNOSIS — I10 ESSENTIAL HYPERTENSION: ICD-10-CM

## 2023-02-08 DIAGNOSIS — M85.80 OSTEOPENIA AFTER MENOPAUSE: ICD-10-CM

## 2023-02-08 DIAGNOSIS — E89.0 HYPOTHYROIDISM, POSTSURGICAL: ICD-10-CM

## 2023-02-08 DIAGNOSIS — Z23 NEED FOR SHINGLES VACCINE: ICD-10-CM

## 2023-02-08 DIAGNOSIS — D57.3 SICKLE CELL TRAIT: ICD-10-CM

## 2023-02-08 DIAGNOSIS — R15.2 FECAL URGENCY: ICD-10-CM

## 2023-02-08 DIAGNOSIS — Z00.00 ROUTINE MEDICAL EXAM: Primary | ICD-10-CM

## 2023-02-08 PROCEDURE — 1101F PR PT FALLS ASSESS DOC 0-1 FALLS W/OUT INJ PAST YR: ICD-10-PCS | Mod: HCNC,CPTII,S$GLB, | Performed by: INTERNAL MEDICINE

## 2023-02-08 PROCEDURE — 3074F SYST BP LT 130 MM HG: CPT | Mod: HCNC,CPTII,S$GLB, | Performed by: INTERNAL MEDICINE

## 2023-02-08 PROCEDURE — 4010F ACE/ARB THERAPY RXD/TAKEN: CPT | Mod: HCNC,CPTII,S$GLB, | Performed by: INTERNAL MEDICINE

## 2023-02-08 PROCEDURE — 3078F DIAST BP <80 MM HG: CPT | Mod: HCNC,CPTII,S$GLB, | Performed by: INTERNAL MEDICINE

## 2023-02-08 PROCEDURE — 1101F PT FALLS ASSESS-DOCD LE1/YR: CPT | Mod: HCNC,CPTII,S$GLB, | Performed by: INTERNAL MEDICINE

## 2023-02-08 PROCEDURE — 4010F PR ACE/ARB THEARPY RXD/TAKEN: ICD-10-PCS | Mod: HCNC,CPTII,S$GLB, | Performed by: INTERNAL MEDICINE

## 2023-02-08 PROCEDURE — 3072F PR LOW RISK FOR RETINOPATHY: ICD-10-PCS | Mod: HCNC,CPTII,S$GLB, | Performed by: INTERNAL MEDICINE

## 2023-02-08 PROCEDURE — 3074F PR MOST RECENT SYSTOLIC BLOOD PRESSURE < 130 MM HG: ICD-10-PCS | Mod: HCNC,CPTII,S$GLB, | Performed by: INTERNAL MEDICINE

## 2023-02-08 PROCEDURE — 3051F PR MOST RECENT HEMOGLOBIN A1C LEVEL 7.0 - < 8.0%: ICD-10-PCS | Mod: HCNC,CPTII,S$GLB, | Performed by: INTERNAL MEDICINE

## 2023-02-08 PROCEDURE — 3008F PR BODY MASS INDEX (BMI) DOCUMENTED: ICD-10-PCS | Mod: HCNC,CPTII,S$GLB, | Performed by: INTERNAL MEDICINE

## 2023-02-08 PROCEDURE — 1159F PR MEDICATION LIST DOCUMENTED IN MEDICAL RECORD: ICD-10-PCS | Mod: HCNC,CPTII,S$GLB, | Performed by: INTERNAL MEDICINE

## 2023-02-08 PROCEDURE — 1160F PR REVIEW ALL MEDS BY PRESCRIBER/CLIN PHARMACIST DOCUMENTED: ICD-10-PCS | Mod: HCNC,CPTII,S$GLB, | Performed by: INTERNAL MEDICINE

## 2023-02-08 PROCEDURE — 99397 PR PREVENTIVE VISIT,EST,65 & OVER: ICD-10-PCS | Mod: HCNC,S$GLB,, | Performed by: INTERNAL MEDICINE

## 2023-02-08 PROCEDURE — 99999 PR PBB SHADOW E&M-EST. PATIENT-LVL IV: ICD-10-PCS | Mod: PBBFAC,HCNC,, | Performed by: INTERNAL MEDICINE

## 2023-02-08 PROCEDURE — 3008F BODY MASS INDEX DOCD: CPT | Mod: HCNC,CPTII,S$GLB, | Performed by: INTERNAL MEDICINE

## 2023-02-08 PROCEDURE — 99397 PER PM REEVAL EST PAT 65+ YR: CPT | Mod: HCNC,S$GLB,, | Performed by: INTERNAL MEDICINE

## 2023-02-08 PROCEDURE — 3288F PR FALLS RISK ASSESSMENT DOCUMENTED: ICD-10-PCS | Mod: HCNC,CPTII,S$GLB, | Performed by: INTERNAL MEDICINE

## 2023-02-08 PROCEDURE — 3078F PR MOST RECENT DIASTOLIC BLOOD PRESSURE < 80 MM HG: ICD-10-PCS | Mod: HCNC,CPTII,S$GLB, | Performed by: INTERNAL MEDICINE

## 2023-02-08 PROCEDURE — 1126F AMNT PAIN NOTED NONE PRSNT: CPT | Mod: HCNC,CPTII,S$GLB, | Performed by: INTERNAL MEDICINE

## 2023-02-08 PROCEDURE — 1126F PR PAIN SEVERITY QUANTIFIED, NO PAIN PRESENT: ICD-10-PCS | Mod: HCNC,CPTII,S$GLB, | Performed by: INTERNAL MEDICINE

## 2023-02-08 PROCEDURE — 1160F RVW MEDS BY RX/DR IN RCRD: CPT | Mod: HCNC,CPTII,S$GLB, | Performed by: INTERNAL MEDICINE

## 2023-02-08 PROCEDURE — 3072F LOW RISK FOR RETINOPATHY: CPT | Mod: HCNC,CPTII,S$GLB, | Performed by: INTERNAL MEDICINE

## 2023-02-08 PROCEDURE — 99999 PR PBB SHADOW E&M-EST. PATIENT-LVL IV: CPT | Mod: PBBFAC,HCNC,, | Performed by: INTERNAL MEDICINE

## 2023-02-08 PROCEDURE — 3051F HG A1C>EQUAL 7.0%<8.0%: CPT | Mod: HCNC,CPTII,S$GLB, | Performed by: INTERNAL MEDICINE

## 2023-02-08 PROCEDURE — 1159F MED LIST DOCD IN RCRD: CPT | Mod: HCNC,CPTII,S$GLB, | Performed by: INTERNAL MEDICINE

## 2023-02-08 PROCEDURE — 3288F FALL RISK ASSESSMENT DOCD: CPT | Mod: HCNC,CPTII,S$GLB, | Performed by: INTERNAL MEDICINE

## 2023-02-08 NOTE — PROGRESS NOTES
HISTORY OF PRESENT ILLNESS:  Masha Garcia is a 67 y.o. female who presents to the clinic today for a routine physical exam. Her last physical exam was approximately 1 years(s) ago.      Objective    PAST MEDICAL HISTORY:  Past Medical History:   Diagnosis Date    AR (allergic rhinitis)     Breast cancer 06/2013    right; followed by oncology, Dr. Oliveira; s/p lumpectomy and  radiation    History of colonic polyps     Hyperlipidemia LDL goal < 100     Hypertension     Lower extremity edema     Mild chronic anemia     Obesity     Osteopenia     Sickle cell trait     Thyroid nodule     Type II or unspecified type diabetes mellitus without mention of complication, uncontrolled     Vitamin D deficiency disease        PAST SURGICAL HISTORY:  Past Surgical History:   Procedure Laterality Date    benign right breast biopsy  2012    lumpectomy right breast  6/2013    right ankle fracture repair      TOTAL ABDOMINAL HYSTERECTOMY      TOTAL THYROIDECTOMY  12/28/2016       SOCIAL HISTORY:  Social History     Socioeconomic History    Marital status:     Number of children: 3   Occupational History    Occupation: retired   Tobacco Use    Smoking status: Never    Smokeless tobacco: Never   Substance and Sexual Activity    Alcohol use: No       FAMILY HISTORY:  Family History   Problem Relation Age of Onset    Diabetes Mother     Hypertension Mother     Hypertension Father     Prostate cancer Father     Diabetes Brother     Cancer Neg Hx     Breast cancer Neg Hx     Colon cancer Neg Hx     Ovarian cancer Neg Hx     Amblyopia Neg Hx     Blindness Neg Hx     Cataracts Neg Hx     Glaucoma Neg Hx     Macular degeneration Neg Hx     Retinal detachment Neg Hx     Strabismus Neg Hx     Stroke Neg Hx     Thyroid disease Neg Hx        ALLERGIES AND MEDICATIONS: updated and reviewed.  Review of patient's allergies indicates:   Allergen Reactions    Fosamax [alendronate] Other (See Comments)     - 'funny feeling'    Ace inhibitors  Other (See Comments)     cough    Penicillins Hives     Medication List with Changes/Refills   Current Medications    BLOOD-GLUCOSE METER (TRUE METRIX GLUCOSE METER) KIT    Use to test BID and PRN    CALCIUM CARBONATE-VITAMIN D3 600 MG-5 MCG (200 UNIT) CAP    Take 1 tablet by mouth every morning.     FLASH GLUCOSE SCANNING READER (FREESTYLE LESLIE 10 DAY READER) MISC    Use as directed to check blood sugar tid and prn.    FLASH GLUCOSE SENSOR (FREESTYLE LESLIE 10 DAY SENSOR) KIT    1 Units by Misc.(Non-Drug; Combo Route) route 3 (three) times daily with meals.    GLIPIZIDE (GLUCOTROL) 10 MG TR24    Take 1 tablet by mouth twice daily    HYDROCHLOROTHIAZIDE (HYDRODIURIL) 25 MG TABLET    TAKE 1 TABLET (25 MG TOTAL) BY MOUTH ONCE DAILY.    LEVOTHYROXINE (SYNTHROID) 150 MCG TABLET    TAKE 1 TABLET BY MOUTH ONCE DAILY BEFORE BREAKFAST    LINAGLIPTIN (TRADJENTA) 5 MG TAB TABLET    Take 1 tablet by mouth once daily    LORATADINE (CLARITIN) 10 MG TABLET    Take 10 mg by mouth daily as needed.     LOSARTAN (COZAAR) 25 MG TABLET    TAKE 1 TABLET (25 MG TOTAL) BY MOUTH ONCE DAILY.    LOVASTATIN (MEVACOR) 20 MG TABLET    TAKE 1 TABLET (20 MG TOTAL) BY MOUTH ONCE DAILY.    METFORMIN (GLUCOPHAGE-XR) 500 MG ER 24HR TABLET    TAKE 2 TABLETS TWICE DAILY WITH MEALS    TRUE METRIX GLUCOSE TEST STRIP STRP    TEST BLOOD SUGAR TWICE DAILY  AND AS NEEDED          CARE TEAM:  Patient Care Team:  Brenda Bailey MD as PCP - General (Internal Medicine)  Eun Frederick MD as Consulting Physician (Oncology)  Elsy Dubose MD as Consulting Physician (Endocrinology)  Angeles Cao LPN as Care Coordinator  Bobby Vallejo MD as Consulting Physician (Gastroenterology)             SCREENING HISTORY:  Health Maintenance         Date Due Completion Date    Shingles Vaccine (1 of 2) Never done ---    TETANUS VACCINE 07/24/2019 7/24/2009    COVID-19 Vaccine (4 - Booster for Pfizer series) 03/04/2022 1/7/2022    Pneumococcal  "Vaccines (Age 65+) (4 - PPSV23 if available, else PCV20) 12/06/2022 12/6/2017    Foot Exam 01/28/2023 1/28/2022    Override on 2/4/2020: Done (skin dryness to bottom of both feet)    Override on 9/28/2016: Done (normal see note in visit)    Mammogram 07/14/2023 7/14/2022    Override on 6/19/2018: Done (imaging center)    Override on 6/15/2016: Done (done at [place] normal scanned report to chart)    Override on 3/18/2015: Done    Override on 3/1/2014: Done ([facility])    Override on 4/1/2013: Done    Override on 2/1/2012: Done    Diabetes Urine Screening 08/03/2023 8/3/2022    Lipid Panel 08/03/2023 8/3/2022    Hemoglobin A1c 08/06/2023 2/6/2023    Eye Exam 09/20/2023 9/20/2022    Override on 12/9/2014: Done    Override on 11/1/2010: Done    Low Dose Statin 02/08/2024 2/8/2023    DEXA Scan 05/10/2024 5/10/2022    Colorectal Cancer Screening 07/02/2024 7/2/2019              REVIEW OF SYSTEMS:   The patient reports : fair dietary habits.  The patient reports  : that they do not exercise regularly  Review of Systems   Constitutional:  Negative for chills and fever.   HENT:  Negative for congestion.    Respiratory:  Negative for shortness of breath.    Cardiovascular:  Negative for chest pain.   Gastrointestinal:  Negative for abdominal pain, blood in stool, diarrhea and nausea.        She reports fecal urgency in am only - a little better with fiber supplement and change of metformin to XR. She thinks it may also be stress related (?IBS).   Genitourinary:  Negative for dysuria.    ROS (Optional)-: no pelvic pain  Breast ROS (Optional)-: negative for breast lumps/discharge          Physical Examination: 274}  Vitals:    02/08/23 1002   BP: 112/66   Pulse: 68   Temp: 97.7 °F (36.5 °C)     Weight: 100.4 kg (221 lb 7.2 oz)   Height: 5' 7" (170.2 cm)   Body mass index is 34.68 kg/m².        General appearance - alert, well appearing, and in no distress, obese, exam limited as patient did not get onto the " examination table  Psychiatric - alert, oriented to person, place, and time, normal behavior, speech, dress, motor activity and thought process  Eyes - pupils equal and reactive, extraocular eye movements intact, sclera anicteric  Mouth - not examined  Neck - supple, no significant adenopathy, carotids upstroke normal bilaterally, no bruits  Lymphatics - no palpable cervical lymphadenopathy  Chest - clear to auscultation, no wheezes, rales or rhonchi, symmetric air entry  Heart - normal rate and regular rhythm  Neurological - alert, normal speech, no focal findings; cranial nerves II through XII intact  Musculoskeletal - patient noted to have Moderate osteoarthritic changes to both knee joints. No joint effusions noted.  Extremities - no pedal edema noted  Skin - normal coloration, no suspicious skin lesions  Diabetic Foot Exam -  Protective Sensation (w/ 10 gram monofilament):  Right:  not examined  Left: Intact    Visual Inspection:  Normal -  Left    Pedal Pulses:   Right: not examined  Left: Present    Posterior tibialis:   Right:not examined  Left: Present      Labs:  Lab Results   Component Value Date    HGBA1C 7.9 (H) 02/06/2023    HGBA1C 7.2 (H) 08/03/2022    HGBA1C 7.6 (H) 01/25/2022      Lab Results   Component Value Date    CHOL 145 08/03/2022    CHOL 138 07/10/2021    CHOL 138 07/10/2021     Lab Results   Component Value Date    LDLCALC 70.2 08/03/2022    LDLCALC 65.0 07/10/2021    LDLCALC 65.0 07/10/2021         ASSESSMENT AND PLAN:  274}  1. Routine medical exam  Counseled on age appropriate medical preventative services including age appropriate cancer screenings, age appropriate eye and dental exams, over all nutritional health, need for a consistent exercise regimen, and an over all push towards maintaining a vigorous and active lifestyle.  Counseled on age appropriate vaccines and discussed upcoming health care needs based on age/gender. Discussed good sleep hygiene and stress management.    2.  Uncontrolled type 2 diabetes mellitus with hyperglycemia  Lab Results   Component Value Date    HGBA1C 7.9 (H) 02/06/2023     Diabetes is under fair control at this time (due to hyperglycemia) for age and comorbid conditions. She reports some dietary indiscretions over the Holidays which have likely caused the increase in her A1c.  We discussed diabetic diet and regular exercise.   We discussed home blood sugar monitoring, if appropriate - the patient should test once daily and as needed.   Continue current medication regimen. Recheck A1c in 6 months. She will do better with diabetic diet and regular physical activity.  Diabetic complications addressed: None addressed today.  Patient was counseled on the need for yearly eye exam to screen for/monitor diabetic retinopathy and yearly diabetic foot exam.    3. Essential hypertension  BP Readings from Last 1 Encounters:   02/08/23 112/66      The current medical regimen is effective;  continue present plan and medications. Recommended patient to check home readings to monitor and see me for followup as scheduled or sooner as needed.   Discussed sodium restriction, maintaining ideal body weight and regular exercise program as physiologic means to continue to achieve blood pressure control in addition to medication compliance.  Patient was educated that both decongestant and anti-inflammatory medication may raise blood pressure.  The patient is not active on the digital hypertension program.    4. Hyperlipidemia with target LDL less than 100  Lab Results   Component Value Date    CHOL 145 08/03/2022     Lab Results   Component Value Date    HDL 52 08/03/2022     Lab Results   Component Value Date    LDLCALC 70.2 08/03/2022     Lab Results   Component Value Date    TRIG 114 08/03/2022     Lab Results   Component Value Date    LDLCALC 70.2 08/03/2022     We discussed low fat diet and regular exercise.The current medical regimen is effective;  continue present plan and  medications.     5. Sickle cell trait  Stable. Asymptomatic. Observe.    6. HX: breast cancer  Up to date on surveillance. No symptoms at this time. Followed by oncology.  Overview:  Right - estrogen receptor positive - June 2013  S/p lumpectomy and radiation.  Took Arimidex 5 years.  Followed by oncology, Dr. Oliveira.      7. Hypothyroidism, postsurgical  Lab Results   Component Value Date    TSH 0.948 02/06/2023     Patient is clinically euthyroid. Continue current regimen.    8. Osteopenia after menopause  We discussed adequate calcium and vitamin D supplementation. We discussed fall precautions. She is up to date on her BMD. No need for prescription medication at this time.  Overview:  - Fosamax made her feel strange -  only took a few times   BMD 2/2020 - No need for Rx tx at this time.   5/2022 - No need for Rx tx at this time.       9. Severe obesity (BMI 35.0-35.9 with comorbidity)  BMI Readings from Last 3 Encounters:   02/08/23 34.68 kg/m²   10/10/22 34.74 kg/m²   08/08/22 35.31 kg/m²     The patient is asked to make an attempt to improve diet and exercise patterns to aid in medical management of this problem.    10. Need for shingles vaccine  Patient was advised to get immunization at the pharmacy.    11. Need for Streptococcus pneumoniae vaccination  Declined today.    12. Need for Td vaccine  Patient was advised to get immunization at the pharmacy.    13. Fecal urgency  The patient feels this is related to some stress.  She did have some improvement with a change of her metformin formulation and fiber supplementation.  I also discussed possible pelvic floor therapy-she would like to defer at this time.  She will let me know if symptoms worsen or persist.             No orders of the defined types were placed in this encounter.     Follow up in about 6 months (around 8/8/2023), or if symptoms worsen or fail to improve, for follow up chronic medical conditions.. or sooner as needed.

## 2023-02-09 DIAGNOSIS — Z00.00 ENCOUNTER FOR MEDICARE ANNUAL WELLNESS EXAM: ICD-10-CM

## 2023-02-13 ENCOUNTER — OFFICE VISIT (OUTPATIENT)
Dept: HEMATOLOGY/ONCOLOGY | Facility: CLINIC | Age: 68
End: 2023-02-13
Payer: MEDICARE

## 2023-02-13 VITALS
BODY MASS INDEX: 34.91 KG/M2 | DIASTOLIC BLOOD PRESSURE: 89 MMHG | SYSTOLIC BLOOD PRESSURE: 140 MMHG | OXYGEN SATURATION: 97 % | HEART RATE: 74 BPM | WEIGHT: 222.44 LBS | HEIGHT: 67 IN

## 2023-02-13 DIAGNOSIS — D64.9 ANEMIA, UNSPECIFIED TYPE: ICD-10-CM

## 2023-02-13 DIAGNOSIS — Z17.0 MALIGNANT NEOPLASM OF RIGHT BREAST IN FEMALE, ESTROGEN RECEPTOR POSITIVE, UNSPECIFIED SITE OF BREAST: Primary | ICD-10-CM

## 2023-02-13 DIAGNOSIS — C50.911 MALIGNANT NEOPLASM OF RIGHT BREAST IN FEMALE, ESTROGEN RECEPTOR POSITIVE, UNSPECIFIED SITE OF BREAST: Primary | ICD-10-CM

## 2023-02-13 PROCEDURE — 1126F AMNT PAIN NOTED NONE PRSNT: CPT | Mod: HCNC,CPTII,S$GLB, | Performed by: INTERNAL MEDICINE

## 2023-02-13 PROCEDURE — 3072F LOW RISK FOR RETINOPATHY: CPT | Mod: HCNC,CPTII,S$GLB, | Performed by: INTERNAL MEDICINE

## 2023-02-13 PROCEDURE — 3008F BODY MASS INDEX DOCD: CPT | Mod: HCNC,CPTII,S$GLB, | Performed by: INTERNAL MEDICINE

## 2023-02-13 PROCEDURE — 1101F PT FALLS ASSESS-DOCD LE1/YR: CPT | Mod: HCNC,CPTII,S$GLB, | Performed by: INTERNAL MEDICINE

## 2023-02-13 PROCEDURE — 4010F PR ACE/ARB THEARPY RXD/TAKEN: ICD-10-PCS | Mod: HCNC,CPTII,S$GLB, | Performed by: INTERNAL MEDICINE

## 2023-02-13 PROCEDURE — 99213 OFFICE O/P EST LOW 20 MIN: CPT | Mod: HCNC,S$GLB,, | Performed by: INTERNAL MEDICINE

## 2023-02-13 PROCEDURE — 99999 PR PBB SHADOW E&M-EST. PATIENT-LVL II: ICD-10-PCS | Mod: PBBFAC,HCNC,, | Performed by: INTERNAL MEDICINE

## 2023-02-13 PROCEDURE — 3288F PR FALLS RISK ASSESSMENT DOCUMENTED: ICD-10-PCS | Mod: HCNC,CPTII,S$GLB, | Performed by: INTERNAL MEDICINE

## 2023-02-13 PROCEDURE — 4010F ACE/ARB THERAPY RXD/TAKEN: CPT | Mod: HCNC,CPTII,S$GLB, | Performed by: INTERNAL MEDICINE

## 2023-02-13 PROCEDURE — 3051F HG A1C>EQUAL 7.0%<8.0%: CPT | Mod: HCNC,CPTII,S$GLB, | Performed by: INTERNAL MEDICINE

## 2023-02-13 PROCEDURE — 3079F PR MOST RECENT DIASTOLIC BLOOD PRESSURE 80-89 MM HG: ICD-10-PCS | Mod: HCNC,CPTII,S$GLB, | Performed by: INTERNAL MEDICINE

## 2023-02-13 PROCEDURE — 3079F DIAST BP 80-89 MM HG: CPT | Mod: HCNC,CPTII,S$GLB, | Performed by: INTERNAL MEDICINE

## 2023-02-13 PROCEDURE — 3077F SYST BP >= 140 MM HG: CPT | Mod: HCNC,CPTII,S$GLB, | Performed by: INTERNAL MEDICINE

## 2023-02-13 PROCEDURE — 99999 PR PBB SHADOW E&M-EST. PATIENT-LVL II: CPT | Mod: PBBFAC,HCNC,, | Performed by: INTERNAL MEDICINE

## 2023-02-13 PROCEDURE — 3288F FALL RISK ASSESSMENT DOCD: CPT | Mod: HCNC,CPTII,S$GLB, | Performed by: INTERNAL MEDICINE

## 2023-02-13 PROCEDURE — 3072F PR LOW RISK FOR RETINOPATHY: ICD-10-PCS | Mod: HCNC,CPTII,S$GLB, | Performed by: INTERNAL MEDICINE

## 2023-02-13 PROCEDURE — 1126F PR PAIN SEVERITY QUANTIFIED, NO PAIN PRESENT: ICD-10-PCS | Mod: HCNC,CPTII,S$GLB, | Performed by: INTERNAL MEDICINE

## 2023-02-13 PROCEDURE — 3077F PR MOST RECENT SYSTOLIC BLOOD PRESSURE >= 140 MM HG: ICD-10-PCS | Mod: HCNC,CPTII,S$GLB, | Performed by: INTERNAL MEDICINE

## 2023-02-13 PROCEDURE — 3008F PR BODY MASS INDEX (BMI) DOCUMENTED: ICD-10-PCS | Mod: HCNC,CPTII,S$GLB, | Performed by: INTERNAL MEDICINE

## 2023-02-13 PROCEDURE — 99213 PR OFFICE/OUTPT VISIT, EST, LEVL III, 20-29 MIN: ICD-10-PCS | Mod: HCNC,S$GLB,, | Performed by: INTERNAL MEDICINE

## 2023-02-13 PROCEDURE — 3051F PR MOST RECENT HEMOGLOBIN A1C LEVEL 7.0 - < 8.0%: ICD-10-PCS | Mod: HCNC,CPTII,S$GLB, | Performed by: INTERNAL MEDICINE

## 2023-02-13 PROCEDURE — 1101F PR PT FALLS ASSESS DOC 0-1 FALLS W/OUT INJ PAST YR: ICD-10-PCS | Mod: HCNC,CPTII,S$GLB, | Performed by: INTERNAL MEDICINE

## 2023-02-13 NOTE — PROGRESS NOTES
Chief Complaint :   Breast Cancer    Hx of Present illness :  Patient is a 67 y.o. year old female who presents to the clinic  for oncology followup. dx'd to have Carcinoma Right Breast  In . Lumpectomy and radiation therapy followed by adjuvant Arimidex therapy. Breast cancer Index did not show  Any benefit with extended harmonal therapy.  Completed arimidex therapy in 2018. Since last visit has had colonoscopic exam on 19 : documented to have a single sessile polyp in the ascending colon. polypectpomy done. Documented to have Grade 2 Internal hemmorrhoids.   Completed Covid vaccination.      Interval Hx: Former pt of Dr. Oliveira's   She completed 5yr of AI   No new issues  Doing well  Appetite and weight stable  No SOB/CP  No cough  She reports mild hot flashes-stable      Mammo 22 ACR BI-RADS Category 2 - Benign Findings      Allergies :    Review of patient's allergies indicates:   Allergen Reactions    Ace inhibitors Other (See Comments)     cough    Penicillins Hives       Occupation : retired .    Transfusion :  None    Menstrual & obstetric Hx :  3; para 3.  Age of menarche: 12  Age of first pregnancy: 18  Lactation history: None  Age of menopause:  Hysterectomy in her late 40's for bleeding  HRT:  For many years.    Present Meds :   Medication List with Changes/Refills   Current Medications    BLOOD-GLUCOSE METER (TRUE METRIX GLUCOSE METER) KIT    Use to test BID and PRN    CALCIUM CARBONATE-VITAMIN D3 600 MG-5 MCG (200 UNIT) CAP    Take 1 tablet by mouth every morning.     FLASH GLUCOSE SCANNING READER (FREESTYLE LESLIE 10 DAY READER) MISC    Use as directed to check blood sugar tid and prn.    FLASH GLUCOSE SENSOR (FREESTYLE LESLIE 10 DAY SENSOR) KIT    1 Units by Misc.(Non-Drug; Combo Route) route 3 (three) times daily with meals.    GLIPIZIDE (GLUCOTROL) 10 MG TR24    Take 1 tablet by mouth twice daily    HYDROCHLOROTHIAZIDE (HYDRODIURIL) 25 MG TABLET    TAKE 1  TABLET (25 MG TOTAL) BY MOUTH ONCE DAILY.    LEVOTHYROXINE (SYNTHROID) 150 MCG TABLET    TAKE 1 TABLET BY MOUTH ONCE DAILY BEFORE BREAKFAST    LINAGLIPTIN (TRADJENTA) 5 MG TAB TABLET    Take 1 tablet by mouth once daily    LORATADINE (CLARITIN) 10 MG TABLET    Take 10 mg by mouth daily as needed.     LOSARTAN (COZAAR) 25 MG TABLET    TAKE 1 TABLET (25 MG TOTAL) BY MOUTH ONCE DAILY.    LOVASTATIN (MEVACOR) 20 MG TABLET    TAKE 1 TABLET (20 MG TOTAL) BY MOUTH ONCE DAILY.    METFORMIN (GLUCOPHAGE-XR) 500 MG ER 24HR TABLET    TAKE 2 TABLETS TWICE DAILY WITH MEALS    TRUE METRIX GLUCOSE TEST STRIP STRP    TEST BLOOD SUGAR TWICE DAILY  AND AS NEEDED       Past Medical Hx :  Carcinoma right breast. Diabetes Mellitus type 2; Hypertension; hyperlipidemia; Allergic rhinitis; osteopenia; sickle cell trait; Thyroid nodule. No Hx of hepatitis, TB, Sarcoid, Lupus, rheumatoid arthritis, seizure disorder, CVA, PE or DVT. No prior Hx of Chemotherapy    Past Medical Hx :  Past Medical History:   Diagnosis Date    AR (allergic rhinitis)     Breast cancer 06/2013    right; followed by oncology, Dr. Oliveira; s/p lumpectomy and  radiation    History of colonic polyps     Hyperlipidemia LDL goal < 100     Hypertension     Lower extremity edema     Mild chronic anemia     Obesity     Osteopenia     Sickle cell trait     Thyroid nodule     Type II or unspecified type diabetes mellitus without mention of complication, uncontrolled     Vitamin D deficiency disease        Travel Hx :  N/A    Immunization :  Immunization History   Administered Date(s) Administered    COVID-19, MRNA, LN-S, PF (Pfizer) (Gray Cap) 01/07/2022    COVID-19, MRNA, LN-S, PF (Pfizer) (Purple Cap) 02/19/2021, 03/12/2021    Influenza (FLUAD) - Quadrivalent - Adjuvanted - PF *Preferred* (65+) 10/13/2020, 12/02/2021    Influenza - Quadrivalent 10/08/2014    Influenza - Quadrivalent - High Dose - PF (65 years and older) 10/10/2022    Influenza - Quadrivalent - PF *Preferred*  (6 months and older) 11/26/2012, 01/10/2014, 09/28/2016, 12/06/2017, 12/12/2018, 02/04/2020    Influenza Split 11/26/2012, 01/10/2014    Pneumococcal Conjugate - 13 Valent 05/04/2016    Pneumococcal Polysaccharide - 23 Valent 08/13/2008, 12/06/2017    Tdap 07/24/2009       Family Hx : reviewed  Family History   Problem Relation Age of Onset    Diabetes Mother     Hypertension Mother     Hypertension Father     Prostate cancer Father     Diabetes Brother     Cancer Neg Hx     Breast cancer Neg Hx     Colon cancer Neg Hx     Ovarian cancer Neg Hx     Amblyopia Neg Hx     Blindness Neg Hx     Cataracts Neg Hx     Glaucoma Neg Hx     Macular degeneration Neg Hx     Retinal detachment Neg Hx     Strabismus Neg Hx     Stroke Neg Hx     Thyroid disease Neg Hx        Social Hx : Reviewed  Social History     Socioeconomic History    Marital status:     Number of children: 3   Occupational History    Occupation: retired   Tobacco Use    Smoking status: Never    Smokeless tobacco: Never   Substance and Sexual Activity    Alcohol use: No       Surgery :  Right Breast Bx; Lumpectomy Right; Tubal ligation; Complete Hysterectomy; Thyroidectomy.  Colonoscopy in 2014.  EGD in  Aug 2018  Colonoscopy 07/02/1`9      Review of Systems   Constitutional:  Negative for fever, malaise/fatigue and weight loss.   HENT:  Negative for congestion, hearing loss, nosebleeds and sore throat.    Eyes:  Negative for blurred vision.        Wears Glasses   Respiratory:  Negative for cough and shortness of breath.    Cardiovascular:  Negative for chest pain and leg swelling.   Gastrointestinal:  Negative for abdominal pain, blood in stool, constipation, diarrhea, heartburn, nausea and vomiting.   Genitourinary:  Negative for dysuria, hematuria and urgency.   Musculoskeletal:  Negative for back pain and neck pain.   Skin:  Negative for itching and rash.   Neurological:  Negative for dizziness, tingling and headaches.   Endo/Heme/Allergies:  Does  not bruise/bleed easily.        Mild hot flashes   Psychiatric/Behavioral:  Negative for depression and memory loss. The patient is nervous/anxious (Sometimes) and has insomnia.      Physical Exam :    Physical Exam   Constitutional: She is oriented to person, place, and time. She appears well-developed and well-nourished.   HENT:   Head: Normocephalic.   Mouth/Throat: Oropharynx is clear and moist.   Eyes: Conjunctivae and lids are normal.No scleral icterus.   Neck: Normal range of motion. Neck supple. No thyromegaly present.   Cardiovascular: Normal rate, regular rhythm and normal heart sounds.    No murmur heard.  Pulmonary/Chest: Breath sounds normal. She has no wheezes. She has no rales.   Abdominal: Soft. Bowel sounds are normal. There is no tenderness. There is no rebound and no guarding.   Musculoskeletal: Normal range of motion. She exhibits no edema and no tenderness.   Lymphadenopathy:     She has no cervical adenopathy.     She has no axillary adenopathy.        Right: No supraclavicular adenopathy present.        Left: No supraclavicular adenopathy present.   Neurological: She is alert and oriented to person, place, and time. No cranial nerve deficit. Coordination normal.   Skin: Skin is warm and dry. No ecchymosis, no petechiae and no rash noted. No erythema.   Psychiatric: She has a normal mood and affect.       Labs & Imaging :   Lab Results   Component Value Date    WBC 10.09 02/06/2023    WBC 10.09 02/06/2023    HGB 10.7 (L) 02/06/2023    HGB 10.7 (L) 02/06/2023    HCT 33.3 (L) 02/06/2023    HCT 33.5 (L) 02/06/2023    MCV 84 02/06/2023    MCV 84 02/06/2023     02/06/2023     02/06/2023 05/05/21 : Hgb 10.9; hct 32.6; MCV 84; platelets 257,000. ANC 7,000.  NFBS 161; cr.0.8; ca 9.7; Bili 0.6; Liver enzymes normal  CEA < 1.0;  CA 27.29 antigen : 23.2          12/30/2021 :  Hgb 10.6; Hct 31.4; MCV 82; Platelets 281,000. ANC 6,400.     NFBS 136; Cr.0.8; Ca 9.5; Bili 0.6. Liver  enzymea normal. eGFR > 60.   CEA < 1.0; CA 27.29 antigen : 27.5           08/28/2020 : Hgb 10.8; hct 33; MCV 85; Platelets 252,000 ANC 7,700. ;      NFBS 167; Cr.0.9; Ca 10.2; bili 0.6; Liver enzymes normal.       eGFR > 60.  CEA < 1.0. CA 27.29 antigen normal.        12/16/19 : Hgb 10.9; Hct 32.3; MCV 85; Platelets 276,000 ANC 7,700. Serum iron 49; TIBC 311; sat 16 %; NFBS 128; Cr.0.8; ca 10.2; bili 0.6  Liver enzymes normal. eFR . 60.     Mammo 7/7/21 ACR BI-RADS Category 2 - Benign Findings       Lab Results   Component Value Date    WBC 10.09 02/06/2023    WBC 10.09 02/06/2023    HGB 10.7 (L) 02/06/2023    HGB 10.7 (L) 02/06/2023    HCT 33.3 (L) 02/06/2023    HCT 33.5 (L) 02/06/2023    MCV 84 02/06/2023    MCV 84 02/06/2023     02/06/2023     02/06/2023       1. History of breast cancer    2. Anemia, unspecified type                  Assessment & Plan:    Reviewed labs  with patient.   Carcinoma Right Breast  In 2013. Lumpectomy and radiation therapy followed by adjuvant Arimidex therapy.   Breast cancer Index did not show  Any benefit with extended harmonal therapy.   She completed five years of Arimidex in 2018  Mammo 7/14/22  ACR BI-RADS Category 2 - Benign Findings   Follow up Mammo planned 7/2023- ordered by treating physician at outside facility  Doing well   Cont to monitor    2. Hb 10.7g/dL -stable  Colonoscopy 2019- benign polyp ascending colon and in rectum  Follow up colonoscopy planned 2024  Fe studies okay except for borderline Fe sats          Cbc,cmp  Ferritin, TIBC and Fe prior to f/u  6mos

## 2023-08-03 ENCOUNTER — TELEPHONE (OUTPATIENT)
Dept: FAMILY MEDICINE | Facility: CLINIC | Age: 68
End: 2023-08-03
Payer: MEDICARE

## 2023-08-03 DIAGNOSIS — E78.5 HYPERLIPIDEMIA WITH TARGET LDL LESS THAN 100: ICD-10-CM

## 2023-08-03 DIAGNOSIS — E11.65 UNCONTROLLED TYPE 2 DIABETES MELLITUS WITH HYPERGLYCEMIA: Primary | ICD-10-CM

## 2023-08-03 NOTE — TELEPHONE ENCOUNTER
Spoke with patient. Patient has another lab appointment on 9/8/23. Patient wants to know if she can wait until then for her lab work.

## 2023-08-03 NOTE — TELEPHONE ENCOUNTER
I would prefer to have results when I see her for her office visit next week to make it a more meaningful visit.

## 2023-08-05 ENCOUNTER — LAB VISIT (OUTPATIENT)
Dept: LAB | Facility: HOSPITAL | Age: 68
End: 2023-08-05
Attending: INTERNAL MEDICINE
Payer: MEDICARE

## 2023-08-05 DIAGNOSIS — E78.5 HYPERLIPIDEMIA WITH TARGET LDL LESS THAN 100: ICD-10-CM

## 2023-08-05 DIAGNOSIS — E11.65 UNCONTROLLED TYPE 2 DIABETES MELLITUS WITH HYPERGLYCEMIA: ICD-10-CM

## 2023-08-05 LAB
ALBUMIN/CREAT UR: 33.1 UG/MG (ref 0–30)
CHOLEST SERPL-MCNC: 149 MG/DL (ref 120–199)
CHOLEST/HDLC SERPL: 2.9 {RATIO} (ref 2–5)
CREAT UR-MCNC: 124 MG/DL (ref 15–325)
ESTIMATED AVG GLUCOSE: 177 MG/DL (ref 68–131)
HBA1C MFR BLD: 7.8 % (ref 4–5.6)
HDLC SERPL-MCNC: 51 MG/DL (ref 40–75)
HDLC SERPL: 34.2 % (ref 20–50)
LDLC SERPL CALC-MCNC: 80.6 MG/DL (ref 63–159)
MICROALBUMIN UR DL<=1MG/L-MCNC: 41 UG/ML
NONHDLC SERPL-MCNC: 98 MG/DL
TRIGL SERPL-MCNC: 87 MG/DL (ref 30–150)

## 2023-08-05 PROCEDURE — 36415 COLL VENOUS BLD VENIPUNCTURE: CPT | Mod: HCNC,PO | Performed by: INTERNAL MEDICINE

## 2023-08-05 PROCEDURE — 80061 LIPID PANEL: CPT | Mod: HCNC | Performed by: INTERNAL MEDICINE

## 2023-08-05 PROCEDURE — 83036 HEMOGLOBIN GLYCOSYLATED A1C: CPT | Mod: HCNC | Performed by: INTERNAL MEDICINE

## 2023-08-05 PROCEDURE — 82570 ASSAY OF URINE CREATININE: CPT | Mod: HCNC | Performed by: INTERNAL MEDICINE

## 2023-08-09 ENCOUNTER — OFFICE VISIT (OUTPATIENT)
Dept: FAMILY MEDICINE | Facility: CLINIC | Age: 68
End: 2023-08-09
Payer: MEDICARE

## 2023-08-09 VITALS
SYSTOLIC BLOOD PRESSURE: 152 MMHG | DIASTOLIC BLOOD PRESSURE: 74 MMHG | OXYGEN SATURATION: 96 % | BODY MASS INDEX: 35.83 KG/M2 | WEIGHT: 228.31 LBS | HEART RATE: 68 BPM | HEIGHT: 67 IN | TEMPERATURE: 98 F

## 2023-08-09 DIAGNOSIS — D57.3 SICKLE CELL TRAIT: ICD-10-CM

## 2023-08-09 DIAGNOSIS — E66.01 SEVERE OBESITY (BMI 35.0-35.9 WITH COMORBIDITY): ICD-10-CM

## 2023-08-09 DIAGNOSIS — Z23 PNEUMOCOCCAL VACCINE ADMINISTERED: ICD-10-CM

## 2023-08-09 DIAGNOSIS — Z12.31 ENCOUNTER FOR SCREENING MAMMOGRAM FOR BREAST CANCER: ICD-10-CM

## 2023-08-09 DIAGNOSIS — M85.80 OSTEOPENIA AFTER MENOPAUSE: ICD-10-CM

## 2023-08-09 DIAGNOSIS — E78.5 HYPERLIPIDEMIA WITH TARGET LDL LESS THAN 100: ICD-10-CM

## 2023-08-09 DIAGNOSIS — Z78.0 OSTEOPENIA AFTER MENOPAUSE: ICD-10-CM

## 2023-08-09 DIAGNOSIS — I10 ESSENTIAL HYPERTENSION: ICD-10-CM

## 2023-08-09 DIAGNOSIS — E11.65 UNCONTROLLED TYPE 2 DIABETES MELLITUS WITH HYPERGLYCEMIA: Primary | ICD-10-CM

## 2023-08-09 DIAGNOSIS — E89.0 HYPOTHYROIDISM, POSTSURGICAL: ICD-10-CM

## 2023-08-09 PROCEDURE — 3077F PR MOST RECENT SYSTOLIC BLOOD PRESSURE >= 140 MM HG: ICD-10-PCS | Mod: HCNC,CPTII,S$GLB, | Performed by: INTERNAL MEDICINE

## 2023-08-09 PROCEDURE — 1159F PR MEDICATION LIST DOCUMENTED IN MEDICAL RECORD: ICD-10-PCS | Mod: HCNC,CPTII,S$GLB, | Performed by: INTERNAL MEDICINE

## 2023-08-09 PROCEDURE — G0009 ADMIN PNEUMOCOCCAL VACCINE: HCPCS | Mod: HCNC,S$GLB,, | Performed by: INTERNAL MEDICINE

## 2023-08-09 PROCEDURE — 99999 PR PBB SHADOW E&M-EST. PATIENT-LVL V: ICD-10-PCS | Mod: PBBFAC,HCNC,, | Performed by: INTERNAL MEDICINE

## 2023-08-09 PROCEDURE — 99214 OFFICE O/P EST MOD 30 MIN: CPT | Mod: 25,HCNC,S$GLB, | Performed by: INTERNAL MEDICINE

## 2023-08-09 PROCEDURE — 3066F PR DOCUMENTATION OF TREATMENT FOR NEPHROPATHY: ICD-10-PCS | Mod: HCNC,CPTII,S$GLB, | Performed by: INTERNAL MEDICINE

## 2023-08-09 PROCEDURE — 3072F PR LOW RISK FOR RETINOPATHY: ICD-10-PCS | Mod: HCNC,CPTII,S$GLB, | Performed by: INTERNAL MEDICINE

## 2023-08-09 PROCEDURE — 3051F HG A1C>EQUAL 7.0%<8.0%: CPT | Mod: HCNC,CPTII,S$GLB, | Performed by: INTERNAL MEDICINE

## 2023-08-09 PROCEDURE — 3077F SYST BP >= 140 MM HG: CPT | Mod: HCNC,CPTII,S$GLB, | Performed by: INTERNAL MEDICINE

## 2023-08-09 PROCEDURE — 3072F LOW RISK FOR RETINOPATHY: CPT | Mod: HCNC,CPTII,S$GLB, | Performed by: INTERNAL MEDICINE

## 2023-08-09 PROCEDURE — 1126F PR PAIN SEVERITY QUANTIFIED, NO PAIN PRESENT: ICD-10-PCS | Mod: HCNC,CPTII,S$GLB, | Performed by: INTERNAL MEDICINE

## 2023-08-09 PROCEDURE — 1126F AMNT PAIN NOTED NONE PRSNT: CPT | Mod: HCNC,CPTII,S$GLB, | Performed by: INTERNAL MEDICINE

## 2023-08-09 PROCEDURE — 3288F PR FALLS RISK ASSESSMENT DOCUMENTED: ICD-10-PCS | Mod: HCNC,CPTII,S$GLB, | Performed by: INTERNAL MEDICINE

## 2023-08-09 PROCEDURE — 1159F MED LIST DOCD IN RCRD: CPT | Mod: HCNC,CPTII,S$GLB, | Performed by: INTERNAL MEDICINE

## 2023-08-09 PROCEDURE — 90677 PNEUMOCOCCAL CONJUGATE VACCINE 20-VALENT: ICD-10-PCS | Mod: HCNC,S$GLB,, | Performed by: INTERNAL MEDICINE

## 2023-08-09 PROCEDURE — 3008F PR BODY MASS INDEX (BMI) DOCUMENTED: ICD-10-PCS | Mod: HCNC,CPTII,S$GLB, | Performed by: INTERNAL MEDICINE

## 2023-08-09 PROCEDURE — 1160F PR REVIEW ALL MEDS BY PRESCRIBER/CLIN PHARMACIST DOCUMENTED: ICD-10-PCS | Mod: HCNC,CPTII,S$GLB, | Performed by: INTERNAL MEDICINE

## 2023-08-09 PROCEDURE — G0009 PNEUMOCOCCAL CONJUGATE VACCINE 20-VALENT: ICD-10-PCS | Mod: HCNC,S$GLB,, | Performed by: INTERNAL MEDICINE

## 2023-08-09 PROCEDURE — 4010F ACE/ARB THERAPY RXD/TAKEN: CPT | Mod: HCNC,CPTII,S$GLB, | Performed by: INTERNAL MEDICINE

## 2023-08-09 PROCEDURE — 3078F PR MOST RECENT DIASTOLIC BLOOD PRESSURE < 80 MM HG: ICD-10-PCS | Mod: HCNC,CPTII,S$GLB, | Performed by: INTERNAL MEDICINE

## 2023-08-09 PROCEDURE — 99214 PR OFFICE/OUTPT VISIT, EST, LEVL IV, 30-39 MIN: ICD-10-PCS | Mod: 25,HCNC,S$GLB, | Performed by: INTERNAL MEDICINE

## 2023-08-09 PROCEDURE — 1101F PT FALLS ASSESS-DOCD LE1/YR: CPT | Mod: HCNC,CPTII,S$GLB, | Performed by: INTERNAL MEDICINE

## 2023-08-09 PROCEDURE — 1101F PR PT FALLS ASSESS DOC 0-1 FALLS W/OUT INJ PAST YR: ICD-10-PCS | Mod: HCNC,CPTII,S$GLB, | Performed by: INTERNAL MEDICINE

## 2023-08-09 PROCEDURE — 3288F FALL RISK ASSESSMENT DOCD: CPT | Mod: HCNC,CPTII,S$GLB, | Performed by: INTERNAL MEDICINE

## 2023-08-09 PROCEDURE — 3008F BODY MASS INDEX DOCD: CPT | Mod: HCNC,CPTII,S$GLB, | Performed by: INTERNAL MEDICINE

## 2023-08-09 PROCEDURE — 90677 PCV20 VACCINE IM: CPT | Mod: HCNC,S$GLB,, | Performed by: INTERNAL MEDICINE

## 2023-08-09 PROCEDURE — 4010F PR ACE/ARB THEARPY RXD/TAKEN: ICD-10-PCS | Mod: HCNC,CPTII,S$GLB, | Performed by: INTERNAL MEDICINE

## 2023-08-09 PROCEDURE — 99999 PR PBB SHADOW E&M-EST. PATIENT-LVL V: CPT | Mod: PBBFAC,HCNC,, | Performed by: INTERNAL MEDICINE

## 2023-08-09 PROCEDURE — 3066F NEPHROPATHY DOC TX: CPT | Mod: HCNC,CPTII,S$GLB, | Performed by: INTERNAL MEDICINE

## 2023-08-09 PROCEDURE — 3060F PR POS MICROALBUMINURIA RESULT DOCUMENTED/REVIEW: ICD-10-PCS | Mod: HCNC,CPTII,S$GLB, | Performed by: INTERNAL MEDICINE

## 2023-08-09 PROCEDURE — 3051F PR MOST RECENT HEMOGLOBIN A1C LEVEL 7.0 - < 8.0%: ICD-10-PCS | Mod: HCNC,CPTII,S$GLB, | Performed by: INTERNAL MEDICINE

## 2023-08-09 PROCEDURE — 3078F DIAST BP <80 MM HG: CPT | Mod: HCNC,CPTII,S$GLB, | Performed by: INTERNAL MEDICINE

## 2023-08-09 PROCEDURE — 3060F POS MICROALBUMINURIA REV: CPT | Mod: HCNC,CPTII,S$GLB, | Performed by: INTERNAL MEDICINE

## 2023-08-09 PROCEDURE — 1160F RVW MEDS BY RX/DR IN RCRD: CPT | Mod: HCNC,CPTII,S$GLB, | Performed by: INTERNAL MEDICINE

## 2023-08-09 NOTE — PROGRESS NOTES
Patient states she will schedule for eye exam.   Patient tolerated pneumococcal  injection. Advised to wait 15mins. VIS information received.

## 2023-08-09 NOTE — PROGRESS NOTES
CHIEF COMPLAINT:   Chief Complaint   Patient presents with    Follow-up     6 months           HISTORY OF PRESENT ILLNESS:  Masha Garcia is a 68 y.o. female who presents to the clinic today for Follow-up (6 months )  .     The patient presents to clinic today for follow-up of her type 2 diabetes mellitus, hypertension, and hyperlipidemia.  She has not been checking her blood pressures at home.  She states she did not take her medication this morning and is nervous being here to discuss her lab results.  She denies cardiac chest pain or shortness of breath.  She has had some mild ankle swelling recently.  She admits to eating watermelon recently in adding salt to it.  She also is aware that she could cut back on some of her sugar and starch intake.      PAST MEDICAL HISTORY:  Past Medical History:   Diagnosis Date    AR (allergic rhinitis)     Breast cancer 06/2013    right; followed by oncology, Dr. Oliveira; s/p lumpectomy and  radiation    History of colonic polyps     Hyperlipidemia LDL goal < 100     Hypertension     Lower extremity edema     Mild chronic anemia     Obesity     Osteopenia     Sickle cell trait     Thyroid nodule     Type II or unspecified type diabetes mellitus without mention of complication, uncontrolled     Vitamin D deficiency disease        PAST SURGICAL HISTORY:  Past Surgical History:   Procedure Laterality Date    benign right breast biopsy  2012    lumpectomy right breast  6/2013    right ankle fracture repair      TOTAL ABDOMINAL HYSTERECTOMY      TOTAL THYROIDECTOMY  12/28/2016       SOCIAL HISTORY:  Social History     Socioeconomic History    Marital status:     Number of children: 3   Occupational History    Occupation: retired   Tobacco Use    Smoking status: Never    Smokeless tobacco: Never   Substance and Sexual Activity    Alcohol use: No       FAMILY HISTORY:  Family History   Problem Relation Age of Onset    Diabetes Mother     Hypertension Mother     Hypertension  Father     Prostate cancer Father     Diabetes Brother     Cancer Neg Hx     Breast cancer Neg Hx     Colon cancer Neg Hx     Ovarian cancer Neg Hx     Amblyopia Neg Hx     Blindness Neg Hx     Cataracts Neg Hx     Glaucoma Neg Hx     Macular degeneration Neg Hx     Retinal detachment Neg Hx     Strabismus Neg Hx     Stroke Neg Hx     Thyroid disease Neg Hx        ALLERGIES AND MEDICATIONS: updated and reviewed.  Review of patient's allergies indicates:   Allergen Reactions    Fosamax [alendronate] Other (See Comments)     - 'funny feeling'    Ace inhibitors Other (See Comments)     cough    Penicillins Hives     Medication List with Changes/Refills   Current Medications    BLOOD-GLUCOSE METER (TRUE METRIX GLUCOSE METER) KIT    Use to test BID and PRN    CALCIUM CARBONATE-VITAMIN D3 600 MG-5 MCG (200 UNIT) CAP    Take 1 tablet by mouth every morning.     FLASH GLUCOSE SCANNING READER (FREESTYLE LESLIE 10 DAY READER) MISC    Use as directed to check blood sugar tid and prn.    FLASH GLUCOSE SENSOR (FREESTYLE LESLIE 10 DAY SENSOR) KIT    1 Units by Misc.(Non-Drug; Combo Route) route 3 (three) times daily with meals.    GLIPIZIDE (GLUCOTROL) 10 MG TR24    Take 1 tablet by mouth twice daily    HYDROCHLOROTHIAZIDE (HYDRODIURIL) 25 MG TABLET    TAKE 1 TABLET ONE TIME DAILY    LEVOTHYROXINE (SYNTHROID) 150 MCG TABLET    Take 1 tablet (150 mcg total) by mouth before breakfast.    LORATADINE (CLARITIN) 10 MG TABLET    Take 10 mg by mouth daily as needed.     LOSARTAN (COZAAR) 25 MG TABLET    TAKE 1 TABLET ONE TIME DAILY    LOVASTATIN (MEVACOR) 20 MG TABLET    TAKE 1 TABLET (20 MG TOTAL) BY MOUTH ONCE DAILY.    METFORMIN (GLUCOPHAGE-XR) 500 MG ER 24HR TABLET    Take 2 tablets (1,000 mg total) by mouth 2 (two) times daily with meals.    TRADJENTA 5 MG TAB TABLET    Take 1 tablet by mouth once daily    TRUE METRIX GLUCOSE TEST STRIP STRP    TEST BLOOD SUGAR TWO TIMES DAILY AND AS NEEDED       CARE TEAM:  Patient Care  "Team:  Brenda Bailey MD as PCP - General (Internal Medicine)  Eun Frederick MD as Consulting Physician (Oncology)  Elsy Dubose MD as Consulting Physician (Endocrinology)  Angeles Cao LPN as Care Coordinator  Bobby Vallejo MD as Consulting Physician (Gastroenterology)       REVIEW OF SYSTEMS:  Review of Systems   Constitutional:  Negative for chills and fatigue.   HENT:  Negative for congestion.    Respiratory:  Negative for cough and shortness of breath.    Cardiovascular:  Positive for leg swelling (see HPI). Negative for chest pain.   Gastrointestinal:  Negative for abdominal pain.   Genitourinary:  Negative for dysuria.   Musculoskeletal:         Occasional left big toe pain at night - no warmth or erythema.         PHYSICAL EXAMINATION/VITALS:  Vitals:    08/09/23 0758   BP: (!) 152/74   Pulse: 68   Temp: 97.7 °F (36.5 °C)   TempSrc: Oral   SpO2: 96%   Weight: 103.5 kg (228 lb 4.6 oz)   Height: 5' 7" (1.702 m)       Body mass index is 35.75 kg/m².      General appearance - alert, well appearing, and in no distress, obese  Psychiatric - alert, oriented to person, place, and time, normal behavior, speech, dress, motor activity and thought process  Eyes - pupils equal and reactive, extraocular eye movements intact, sclera anicteric  Mouth - not examined  Neck - supple, no significant adenopathy, carotids upstroke normal bilaterally, no bruits  Lymphatics - no palpable cervical lymphadenopathy  Chest - clear to auscultation, no wheezes, rales or rhonchi, symmetric air entry  Heart - normal rate and regular rhythm, no gallops noted  Neurological - alert, normal speech, no focal findings; cranial nerves II through XII intact  Musculoskeletal - patient noted to have Mild-Moderate osteoarthritic changes to both knee joints. No joint effusions noted.  Extremities - pedal edema trace - mostly around her ankles  Skin - normal coloration, no suspicious skin lesions      LABS:  Lab " Results   Component Value Date    HGBA1C 7.8 (H) 08/05/2023    HGBA1C 7.9 (H) 02/06/2023    HGBA1C 7.2 (H) 08/03/2022      Lab Results   Component Value Date    CHOL 149 08/05/2023    CHOL 145 08/03/2022    CHOL 138 07/10/2021    CHOL 138 07/10/2021     Lab Results   Component Value Date    LDLCALC 80.6 08/05/2023    LDLCALC 70.2 08/03/2022    LDLCALC 65.0 07/10/2021    LDLCALC 65.0 07/10/2021         ASSESSMENT AND PLAN:  1. Uncontrolled type 2 diabetes mellitus with hyperglycemia  Lab Results   Component Value Date    HGBA1C 7.8 (H) 08/05/2023     Diabetes is under fair control at this time (due to hyperglycemia) for age and comorbid conditions.   We discussed diabetic diet and regular exercise.   We discussed home blood sugar monitoring, if appropriate - the patient should test once daily and as needed.   Continue current medication regimen.  Recheck A1c in 6 months.  She will do better with diabetic diet and regular physical activity.  Diabetic complications addressed: Not applicable.  Patient was counseled on the need for yearly eye exam to screen for/monitor diabetic retinopathy and yearly diabetic foot exam.    2. Essential hypertension  BP Readings from Last 1 Encounters:   08/09/23 (!) 152/74      Blood pressure is not controlled today.   We discussed low salt diet and regular exercise. The patient reports that she has not taken any decongestant or anti-inflammatory medication recently (the patient was educated that these medications can increase blood pressure).    Medication changes made today: None.   Patient will come back in 2-4 weeks for recheck of blood pressure by nursing staff.  The patient was also asked to check blood pressure at home on a regular basis and bring recordings to next office visit.   The patient did not want to enroll in the digital hypertension program at this time.     3. Hyperlipidemia with target LDL less than 100  Lab Results   Component Value Date    CHOL 149 08/05/2023      Lab Results   Component Value Date    HDL 51 08/05/2023     Lab Results   Component Value Date    LDLCALC 80.6 08/05/2023     Lab Results   Component Value Date    TRIG 87 08/05/2023     Lab Results   Component Value Date    LDLCALC 80.6 08/05/2023     We discussed low fat diet and regular exercise.The current medical regimen is effective;  continue present plan and medications.     4. Sickle cell trait  Stable. Asymptomatic. Observe.    5. Hypothyroidism, postsurgical  Lab Results   Component Value Date    TSH 0.948 02/06/2023     Patient is clinically euthyroid. Continue current regimen.    6. Osteopenia after menopause  We discussed adequate calcium intake (preferably from diet) and vitamin D supplementation. We discussed fall precautions. She is up to date on her BMD. No need for prescription medication at this time.  Overview:  - Fosamax made her feel strange -  only took a few times   BMD 2/2020 - No need for Rx tx at this time.   -5/2022 - No need for Rx tx at this time.        7. Severe obesity (BMI 35.0-35.9 with comorbidity)  BMI Readings from Last 3 Encounters:   08/09/23 35.75 kg/m²   02/13/23 34.84 kg/m²   02/08/23 34.68 kg/m²     The patient is asked to make an attempt to improve diet and exercise patterns to aid in medical management of this problem.    8. Encounter for screening mammogram for breast cancer  She had a mammogram done at [facility] 7/20/23 for follow up of her breast cancer. She received a bill as it was considered out of network and she needed a referral to complete. We will fax an order and work with her insurance to get this covered.  -     Mammo Digital Screening Bilat w/ [facility]; Future; Expected date: 08/09/2023            Orders Placed This Encounter   Procedures    Mammo Digital Screening Bilat w/ [facility]      FOLLOW UP: Follow up in about 6 months (around 2/9/2024) for follow up chronic medical conditions.. or sooner as needed.

## 2023-08-23 ENCOUNTER — CLINICAL SUPPORT (OUTPATIENT)
Dept: FAMILY MEDICINE | Facility: CLINIC | Age: 68
End: 2023-08-23
Payer: MEDICARE

## 2023-08-23 VITALS — DIASTOLIC BLOOD PRESSURE: 72 MMHG | OXYGEN SATURATION: 97 % | HEART RATE: 71 BPM | SYSTOLIC BLOOD PRESSURE: 136 MMHG

## 2023-08-23 DIAGNOSIS — I10 ESSENTIAL HYPERTENSION: Primary | ICD-10-CM

## 2023-08-23 PROCEDURE — 99999 PR PBB SHADOW E&M-EST. PATIENT-LVL II: ICD-10-PCS | Mod: PBBFAC,HCNC,,

## 2023-08-23 PROCEDURE — 99999 PR PBB SHADOW E&M-EST. PATIENT-LVL II: CPT | Mod: PBBFAC,HCNC,,

## 2023-08-23 NOTE — PROGRESS NOTES
Masha Garcia 68 y.o. female is here today for Blood Pressure check.   History of HTN yes.    Review of patient's allergies indicates:   Allergen Reactions    Fosamax [alendronate] Other (See Comments)     - 'funny feeling'    Ace inhibitors Other (See Comments)     cough    Penicillins Hives     Creatinine   Date Value Ref Range Status   02/06/2023 0.9 0.5 - 1.4 mg/dL Final   02/06/2023 0.9 0.5 - 1.4 mg/dL Final     Sodium   Date Value Ref Range Status   02/06/2023 140 136 - 145 mmol/L Final   02/06/2023 140 136 - 145 mmol/L Final     Potassium   Date Value Ref Range Status   02/06/2023 3.5 3.5 - 5.1 mmol/L Final   02/06/2023 3.5 3.5 - 5.1 mmol/L Final   ]  Patient verifies taking blood pressure medications on a regular basis at the same time of the day.     Current Outpatient Medications:     blood-glucose meter (TRUE METRIX GLUCOSE METER) kit, Use to test BID and PRN, Disp: 1 each, Rfl: 0    calcium carbonate-vitamin D3 600 mg-5 mcg (200 unit) Cap, Take 1 tablet by mouth every morning. , Disp: , Rfl:     flash glucose scanning reader (FREESTYLE LESLIE 10 DAY READER) Misc, Use as directed to check blood sugar tid and prn., Disp: 1 each, Rfl: 0    flash glucose sensor (FREESTYLE LESLIE 10 DAY SENSOR) Kit, 1 Units by Misc.(Non-Drug; Combo Route) route 3 (three) times daily with meals., Disp: 1 kit, Rfl: 11    glipiZIDE (GLUCOTROL) 10 MG TR24, Take 1 tablet by mouth twice daily, Disp: 180 tablet, Rfl: 1    hydroCHLOROthiazide (HYDRODIURIL) 25 MG tablet, TAKE 1 TABLET ONE TIME DAILY, Disp: 90 tablet, Rfl: 1    levothyroxine (SYNTHROID) 150 MCG tablet, Take 1 tablet (150 mcg total) by mouth before breakfast., Disp: 90 tablet, Rfl: 1    loratadine (CLARITIN) 10 mg tablet, Take 10 mg by mouth daily as needed. , Disp: , Rfl:     losartan (COZAAR) 25 MG tablet, TAKE 1 TABLET ONE TIME DAILY, Disp: 90 tablet, Rfl: 1    lovastatin (MEVACOR) 20 MG tablet, TAKE 1 TABLET (20 MG TOTAL) BY MOUTH ONCE DAILY., Disp: 90 tablet, Rfl:  2    metFORMIN (GLUCOPHAGE-XR) 500 MG ER 24hr tablet, Take 2 tablets (1,000 mg total) by mouth 2 (two) times daily with meals., Disp: 360 tablet, Rfl: 1    TRADJENTA 5 mg Tab tablet, Take 1 tablet by mouth once daily, Disp: 90 tablet, Rfl: 1    TRUE METRIX GLUCOSE TEST STRIP Strp, TEST BLOOD SUGAR TWO TIMES DAILY AND AS NEEDED, Disp: 300 strip, Rfl: 3  Does patient have record of home blood pressure readings no.   Last dose of blood pressure medication was taken at 9am.  Patient is asymptomatic.   Complains of none.    Vitals:    08/23/23 1040   BP: 136/72   BP Location: Left arm   Patient Position: Sitting   BP Method: Large (Manual)   Pulse: 71   SpO2: 97%         Dr. Bailey informed of nurse visit.       <-------- Click here to INCLUDE [facility]-19 Discharge Instructions

## 2023-08-23 NOTE — PROGRESS NOTES
Blood pressure ok. Continue current regimen. Next office visit for routine follow up as instructed at last office visit.

## 2023-09-08 ENCOUNTER — LAB VISIT (OUTPATIENT)
Dept: LAB | Facility: HOSPITAL | Age: 68
End: 2023-09-08
Attending: INTERNAL MEDICINE
Payer: MEDICARE

## 2023-09-08 DIAGNOSIS — C50.911 MALIGNANT NEOPLASM OF RIGHT BREAST IN FEMALE, ESTROGEN RECEPTOR POSITIVE, UNSPECIFIED SITE OF BREAST: ICD-10-CM

## 2023-09-08 DIAGNOSIS — D64.9 ANEMIA, UNSPECIFIED TYPE: ICD-10-CM

## 2023-09-08 DIAGNOSIS — Z17.0 MALIGNANT NEOPLASM OF RIGHT BREAST IN FEMALE, ESTROGEN RECEPTOR POSITIVE, UNSPECIFIED SITE OF BREAST: ICD-10-CM

## 2023-09-08 LAB
ALBUMIN SERPL BCP-MCNC: 3.5 G/DL (ref 3.5–5.2)
ALP SERPL-CCNC: 56 U/L (ref 55–135)
ALT SERPL W/O P-5'-P-CCNC: 16 U/L (ref 10–44)
ANION GAP SERPL CALC-SCNC: 13 MMOL/L (ref 8–16)
AST SERPL-CCNC: 15 U/L (ref 10–40)
BASOPHILS # BLD AUTO: 0.05 K/UL (ref 0–0.2)
BASOPHILS NFR BLD: 0.5 % (ref 0–1.9)
BILIRUB SERPL-MCNC: 0.5 MG/DL (ref 0.1–1)
BUN SERPL-MCNC: 15 MG/DL (ref 8–23)
CALCIUM SERPL-MCNC: 10.3 MG/DL (ref 8.7–10.5)
CHLORIDE SERPL-SCNC: 103 MMOL/L (ref 95–110)
CO2 SERPL-SCNC: 24 MMOL/L (ref 23–29)
CREAT SERPL-MCNC: 0.9 MG/DL (ref 0.5–1.4)
DIFFERENTIAL METHOD: ABNORMAL
EOSINOPHIL # BLD AUTO: 0.1 K/UL (ref 0–0.5)
EOSINOPHIL NFR BLD: 1 % (ref 0–8)
ERYTHROCYTE [DISTWIDTH] IN BLOOD BY AUTOMATED COUNT: 15.3 % (ref 11.5–14.5)
EST. GFR  (NO RACE VARIABLE): >60 ML/MIN/1.73 M^2
FERRITIN SERPL-MCNC: 39 NG/ML (ref 20–300)
GLUCOSE SERPL-MCNC: 156 MG/DL (ref 70–110)
HCT VFR BLD AUTO: 32.7 % (ref 37–48.5)
HGB BLD-MCNC: 10.7 G/DL (ref 12–16)
IMM GRANULOCYTES # BLD AUTO: 0.05 K/UL (ref 0–0.04)
IMM GRANULOCYTES NFR BLD AUTO: 0.5 % (ref 0–0.5)
IRON SERPL-MCNC: 71 UG/DL (ref 30–160)
LYMPHOCYTES # BLD AUTO: 3.2 K/UL (ref 1–4.8)
LYMPHOCYTES NFR BLD: 29.8 % (ref 18–48)
MCH RBC QN AUTO: 27.2 PG (ref 27–31)
MCHC RBC AUTO-ENTMCNC: 32.7 G/DL (ref 32–36)
MCV RBC AUTO: 83 FL (ref 82–98)
MONOCYTES # BLD AUTO: 0.4 K/UL (ref 0.3–1)
MONOCYTES NFR BLD: 4.1 % (ref 4–15)
NEUTROPHILS # BLD AUTO: 6.8 K/UL (ref 1.8–7.7)
NEUTROPHILS NFR BLD: 64.1 % (ref 38–73)
NRBC BLD-RTO: 0 /100 WBC
PLATELET # BLD AUTO: 238 K/UL (ref 150–450)
PMV BLD AUTO: 12.7 FL (ref 9.2–12.9)
POTASSIUM SERPL-SCNC: 3.7 MMOL/L (ref 3.5–5.1)
PROT SERPL-MCNC: 7.1 G/DL (ref 6–8.4)
RBC # BLD AUTO: 3.93 M/UL (ref 4–5.4)
SATURATED IRON: 19 % (ref 20–50)
SODIUM SERPL-SCNC: 140 MMOL/L (ref 136–145)
TOTAL IRON BINDING CAPACITY: 366 UG/DL (ref 250–450)
TRANSFERRIN SERPL-MCNC: 247 MG/DL (ref 200–375)
WBC # BLD AUTO: 10.62 K/UL (ref 3.9–12.7)

## 2023-09-08 PROCEDURE — 83540 ASSAY OF IRON: CPT | Mod: HCNC | Performed by: INTERNAL MEDICINE

## 2023-09-08 PROCEDURE — 82728 ASSAY OF FERRITIN: CPT | Mod: HCNC | Performed by: INTERNAL MEDICINE

## 2023-09-08 PROCEDURE — 80053 COMPREHEN METABOLIC PANEL: CPT | Mod: HCNC | Performed by: INTERNAL MEDICINE

## 2023-09-08 PROCEDURE — 84466 ASSAY OF TRANSFERRIN: CPT | Mod: HCNC | Performed by: INTERNAL MEDICINE

## 2023-09-08 PROCEDURE — 36415 COLL VENOUS BLD VENIPUNCTURE: CPT | Mod: HCNC,PO | Performed by: INTERNAL MEDICINE

## 2023-09-08 PROCEDURE — 85025 COMPLETE CBC W/AUTO DIFF WBC: CPT | Mod: HCNC | Performed by: INTERNAL MEDICINE

## 2023-09-12 ENCOUNTER — OFFICE VISIT (OUTPATIENT)
Dept: HEMATOLOGY/ONCOLOGY | Facility: CLINIC | Age: 68
End: 2023-09-12
Payer: MEDICARE

## 2023-09-12 VITALS
SYSTOLIC BLOOD PRESSURE: 130 MMHG | OXYGEN SATURATION: 96 % | HEIGHT: 67 IN | HEART RATE: 72 BPM | DIASTOLIC BLOOD PRESSURE: 80 MMHG | WEIGHT: 226 LBS | BODY MASS INDEX: 35.47 KG/M2

## 2023-09-12 DIAGNOSIS — D64.9 ANEMIA, UNSPECIFIED TYPE: ICD-10-CM

## 2023-09-12 DIAGNOSIS — Z17.0 MALIGNANT NEOPLASM OF RIGHT BREAST IN FEMALE, ESTROGEN RECEPTOR POSITIVE, UNSPECIFIED SITE OF BREAST: Primary | ICD-10-CM

## 2023-09-12 DIAGNOSIS — C50.911 MALIGNANT NEOPLASM OF RIGHT BREAST IN FEMALE, ESTROGEN RECEPTOR POSITIVE, UNSPECIFIED SITE OF BREAST: Primary | ICD-10-CM

## 2023-09-12 PROCEDURE — 3072F LOW RISK FOR RETINOPATHY: CPT | Mod: HCNC,CPTII,S$GLB, | Performed by: INTERNAL MEDICINE

## 2023-09-12 PROCEDURE — 3072F PR LOW RISK FOR RETINOPATHY: ICD-10-PCS | Mod: HCNC,CPTII,S$GLB, | Performed by: INTERNAL MEDICINE

## 2023-09-12 PROCEDURE — 99999 PR PBB SHADOW E&M-EST. PATIENT-LVL III: ICD-10-PCS | Mod: PBBFAC,HCNC,, | Performed by: INTERNAL MEDICINE

## 2023-09-12 PROCEDURE — 3008F BODY MASS INDEX DOCD: CPT | Mod: HCNC,CPTII,S$GLB, | Performed by: INTERNAL MEDICINE

## 2023-09-12 PROCEDURE — 4010F PR ACE/ARB THEARPY RXD/TAKEN: ICD-10-PCS | Mod: HCNC,CPTII,S$GLB, | Performed by: INTERNAL MEDICINE

## 2023-09-12 PROCEDURE — 3051F HG A1C>EQUAL 7.0%<8.0%: CPT | Mod: HCNC,CPTII,S$GLB, | Performed by: INTERNAL MEDICINE

## 2023-09-12 PROCEDURE — 1126F PR PAIN SEVERITY QUANTIFIED, NO PAIN PRESENT: ICD-10-PCS | Mod: HCNC,CPTII,S$GLB, | Performed by: INTERNAL MEDICINE

## 2023-09-12 PROCEDURE — 3288F FALL RISK ASSESSMENT DOCD: CPT | Mod: HCNC,CPTII,S$GLB, | Performed by: INTERNAL MEDICINE

## 2023-09-12 PROCEDURE — 3060F POS MICROALBUMINURIA REV: CPT | Mod: HCNC,CPTII,S$GLB, | Performed by: INTERNAL MEDICINE

## 2023-09-12 PROCEDURE — 99213 OFFICE O/P EST LOW 20 MIN: CPT | Mod: HCNC,S$GLB,, | Performed by: INTERNAL MEDICINE

## 2023-09-12 PROCEDURE — 3079F DIAST BP 80-89 MM HG: CPT | Mod: HCNC,CPTII,S$GLB, | Performed by: INTERNAL MEDICINE

## 2023-09-12 PROCEDURE — 1101F PT FALLS ASSESS-DOCD LE1/YR: CPT | Mod: HCNC,CPTII,S$GLB, | Performed by: INTERNAL MEDICINE

## 2023-09-12 PROCEDURE — 99213 PR OFFICE/OUTPT VISIT, EST, LEVL III, 20-29 MIN: ICD-10-PCS | Mod: HCNC,S$GLB,, | Performed by: INTERNAL MEDICINE

## 2023-09-12 PROCEDURE — 1101F PR PT FALLS ASSESS DOC 0-1 FALLS W/OUT INJ PAST YR: ICD-10-PCS | Mod: HCNC,CPTII,S$GLB, | Performed by: INTERNAL MEDICINE

## 2023-09-12 PROCEDURE — 3079F PR MOST RECENT DIASTOLIC BLOOD PRESSURE 80-89 MM HG: ICD-10-PCS | Mod: HCNC,CPTII,S$GLB, | Performed by: INTERNAL MEDICINE

## 2023-09-12 PROCEDURE — 3075F PR MOST RECENT SYSTOLIC BLOOD PRESS GE 130-139MM HG: ICD-10-PCS | Mod: HCNC,CPTII,S$GLB, | Performed by: INTERNAL MEDICINE

## 2023-09-12 PROCEDURE — 1126F AMNT PAIN NOTED NONE PRSNT: CPT | Mod: HCNC,CPTII,S$GLB, | Performed by: INTERNAL MEDICINE

## 2023-09-12 PROCEDURE — 3075F SYST BP GE 130 - 139MM HG: CPT | Mod: HCNC,CPTII,S$GLB, | Performed by: INTERNAL MEDICINE

## 2023-09-12 PROCEDURE — 3288F PR FALLS RISK ASSESSMENT DOCUMENTED: ICD-10-PCS | Mod: HCNC,CPTII,S$GLB, | Performed by: INTERNAL MEDICINE

## 2023-09-12 PROCEDURE — 3060F PR POS MICROALBUMINURIA RESULT DOCUMENTED/REVIEW: ICD-10-PCS | Mod: HCNC,CPTII,S$GLB, | Performed by: INTERNAL MEDICINE

## 2023-09-12 PROCEDURE — 3066F PR DOCUMENTATION OF TREATMENT FOR NEPHROPATHY: ICD-10-PCS | Mod: HCNC,CPTII,S$GLB, | Performed by: INTERNAL MEDICINE

## 2023-09-12 PROCEDURE — 3066F NEPHROPATHY DOC TX: CPT | Mod: HCNC,CPTII,S$GLB, | Performed by: INTERNAL MEDICINE

## 2023-09-12 PROCEDURE — 4010F ACE/ARB THERAPY RXD/TAKEN: CPT | Mod: HCNC,CPTII,S$GLB, | Performed by: INTERNAL MEDICINE

## 2023-09-12 PROCEDURE — 3051F PR MOST RECENT HEMOGLOBIN A1C LEVEL 7.0 - < 8.0%: ICD-10-PCS | Mod: HCNC,CPTII,S$GLB, | Performed by: INTERNAL MEDICINE

## 2023-09-12 PROCEDURE — 3008F PR BODY MASS INDEX (BMI) DOCUMENTED: ICD-10-PCS | Mod: HCNC,CPTII,S$GLB, | Performed by: INTERNAL MEDICINE

## 2023-09-12 PROCEDURE — 99999 PR PBB SHADOW E&M-EST. PATIENT-LVL III: CPT | Mod: PBBFAC,HCNC,, | Performed by: INTERNAL MEDICINE

## 2023-09-12 NOTE — PROGRESS NOTES
Chief Complaint :   Breast Cancer    Hx of Present illness :  Patient is a 68 y.o. year old female who presents to the clinic  for oncology followup. dx'd to have Carcinoma Right Breast  In . Lumpectomy and radiation therapy followed by adjuvant Arimidex therapy. Breast cancer Index did not show  Any benefit with extended harmonal therapy.  Completed arimidex therapy in 2018. Since last visit has had colonoscopic exam on 19 : documented to have a single sessile polyp in the ascending colon. polypectpomy done. Documented to have Grade 2 Internal hemmorrhoids.   Completed Covid vaccination.      Interval Hx: Former pt of Dr. Oliveira's   She completed 5yr of AI   No new issues  Doing well  Appetite and weight stable  No SOB/CP  She reports mild hot flashes-stable      Mammo 23 ACR BI-RADS Category 1 - Negative       Allergies :    Review of patient's allergies indicates:   Allergen Reactions    Ace inhibitors Other (See Comments)     cough    Penicillins Hives       Occupation : retired .    Transfusion :  None    Menstrual & obstetric Hx :  3; para 3.  Age of menarche: 12  Age of first pregnancy: 18  Lactation history: None  Age of menopause:  Hysterectomy in her late 40's for bleeding  HRT:  For many years.    Present Meds :   Medication List with Changes/Refills   Current Medications    BLOOD-GLUCOSE METER (TRUE METRIX GLUCOSE METER) KIT    Use to test BID and PRN    CALCIUM CARBONATE-VITAMIN D3 600 MG-5 MCG (200 UNIT) CAP    Take 1 tablet by mouth every morning.     FLASH GLUCOSE SCANNING READER (FREESTYLE LESLIE 10 DAY READER) MISC    Use as directed to check blood sugar tid and prn.    FLASH GLUCOSE SENSOR (FREESTYLE LESLIE 10 DAY SENSOR) KIT    1 Units by Misc.(Non-Drug; Combo Route) route 3 (three) times daily with meals.    GLIPIZIDE (GLUCOTROL) 10 MG TR24    Take 1 tablet by mouth twice daily    HYDROCHLOROTHIAZIDE (HYDRODIURIL) 25 MG TABLET    TAKE 1 TABLET ONE TIME  DAILY    LEVOTHYROXINE (SYNTHROID) 150 MCG TABLET    Take 1 tablet (150 mcg total) by mouth before breakfast.    LORATADINE (CLARITIN) 10 MG TABLET    Take 10 mg by mouth daily as needed.     LOSARTAN (COZAAR) 25 MG TABLET    TAKE 1 TABLET ONE TIME DAILY    LOVASTATIN (MEVACOR) 20 MG TABLET    TAKE 1 TABLET (20 MG TOTAL) BY MOUTH ONCE DAILY.    METFORMIN (GLUCOPHAGE-XR) 500 MG ER 24HR TABLET    Take 2 tablets (1,000 mg total) by mouth 2 (two) times daily with meals.    TRADJENTA 5 MG TAB TABLET    Take 1 tablet by mouth once daily    TRUE METRIX GLUCOSE TEST STRIP STRP    TEST BLOOD SUGAR TWO TIMES DAILY AND AS NEEDED       Past Medical Hx :  Carcinoma right breast. Diabetes Mellitus type 2; Hypertension; hyperlipidemia; Allergic rhinitis; osteopenia; sickle cell trait; Thyroid nodule. No Hx of hepatitis, TB, Sarcoid, Lupus, rheumatoid arthritis, seizure disorder, CVA, PE or DVT. No prior Hx of Chemotherapy    Past Medical Hx :  Past Medical History:   Diagnosis Date    AR (allergic rhinitis)     Breast cancer 06/2013    right; followed by oncology, Dr. Oliveira; s/p lumpectomy and  radiation    History of colonic polyps     Hyperlipidemia LDL goal < 100     Hypertension     Lower extremity edema     Mild chronic anemia     Obesity     Osteopenia     Sickle cell trait     Thyroid nodule     Type II or unspecified type diabetes mellitus without mention of complication, uncontrolled     Vitamin D deficiency disease        Travel Hx :  N/A    Immunization :  Immunization History   Administered Date(s) Administered    COVID-19, MRNA, LN-S, PF (Pfizer) (Gray Cap) 01/07/2022    COVID-19, MRNA, LN-S, PF (Pfizer) (Purple Cap) 02/19/2021, 03/12/2021    Influenza (FLUAD) - Quadrivalent - Adjuvanted - PF *Preferred* (65+) 10/13/2020, 12/02/2021    Influenza - Quadrivalent 10/08/2014    Influenza - Quadrivalent - High Dose - PF (65 years and older) 10/10/2022    Influenza - Quadrivalent - PF *Preferred* (6 months and older)  11/26/2012, 01/10/2014, 09/28/2016, 12/06/2017, 12/12/2018, 02/04/2020    Influenza Split 11/26/2012, 01/10/2014    Pneumococcal Conjugate - 13 Valent 05/04/2016    Pneumococcal Conjugate - 20 Valent 08/09/2023    Pneumococcal Polysaccharide - 23 Valent 08/13/2008, 12/06/2017    Tdap 07/24/2009       Family Hx : reviewed  Family History   Problem Relation Age of Onset    Diabetes Mother     Hypertension Mother     Hypertension Father     Prostate cancer Father     Diabetes Brother     Cancer Neg Hx     Breast cancer Neg Hx     Colon cancer Neg Hx     Ovarian cancer Neg Hx     Amblyopia Neg Hx     Blindness Neg Hx     Cataracts Neg Hx     Glaucoma Neg Hx     Macular degeneration Neg Hx     Retinal detachment Neg Hx     Strabismus Neg Hx     Stroke Neg Hx     Thyroid disease Neg Hx        Social Hx : Reviewed  Social History     Socioeconomic History    Marital status:     Number of children: 3   Occupational History    Occupation: retired   Tobacco Use    Smoking status: Never    Smokeless tobacco: Never   Substance and Sexual Activity    Alcohol use: No       Surgery :  Right Breast Bx; Lumpectomy Right; Tubal ligation; Complete Hysterectomy; Thyroidectomy.  Colonoscopy in 2014.  EGD in  Aug 2018  Colonoscopy 07/02/1`9      Review of Systems   Constitutional:  Negative for fever, malaise/fatigue and weight loss.   HENT:  Negative for congestion, hearing loss, nosebleeds and sore throat.    Eyes:  Negative for blurred vision.        Wears Glasses   Respiratory:  Negative for cough and shortness of breath.    Cardiovascular:  Negative for chest pain and leg swelling.   Gastrointestinal:  Negative for abdominal pain, blood in stool, constipation, diarrhea, heartburn, nausea and vomiting.   Genitourinary:  Negative for dysuria, hematuria and urgency.   Musculoskeletal:  Negative for back pain and neck pain.   Skin:  Negative for itching and rash.   Neurological:  Negative for dizziness, tingling and headaches.  "  Endo/Heme/Allergies:  Does not bruise/bleed easily.        Mild hot flashes   Psychiatric/Behavioral:  Negative for depression and memory loss. The patient is nervous/anxious (Sometimes) and has insomnia.        Vitals:    09/12/23 1010   BP: 130/80   Pulse: 72   SpO2: 96%   Weight: 102.5 kg (225 lb 15.5 oz)   Height: 5' 7" (1.702 m)           Physical Exam :    Physical Exam   Constitutional: She is oriented to person, place, and time. She appears well-developed and well-nourished.   HENT:   Head: Normocephalic.   Mouth/Throat: Oropharynx is clear and moist.   Eyes: Conjunctivae and lids are normal.No scleral icterus.   Neck: Normal range of motion. Neck supple. No thyromegaly present.   Cardiovascular: Normal rate, regular rhythm and normal heart sounds.    No murmur heard.  Pulmonary/Chest: Breath sounds normal. She has no wheezes. She has no rales.   Abdominal: Soft. Bowel sounds are normal. There is no tenderness. There is no rebound and no guarding.   Musculoskeletal: Normal range of motion. She exhibits no edema and no tenderness.  Breasts- no masses   Lymphadenopathy:     She has no cervical adenopathy.     She has no axillary adenopathy.        Right: No supraclavicular adenopathy present.        Left: No supraclavicular adenopathy present.   Neurological: She is alert and oriented to person, place, and time. No cranial nerve deficit. Coordination normal.   Skin: Skin is warm and dry. No ecchymosis, no petechiae and no rash noted. No erythema.   Psychiatric: She has a normal mood and affect.       Labs & Imaging :   Lab Results   Component Value Date    WBC 10.62 09/08/2023    HGB 10.7 (L) 09/08/2023    HCT 32.7 (L) 09/08/2023    MCV 83 09/08/2023     09/08/2023 05/05/21 : Hgb 10.9; hct 32.6; MCV 84; platelets 257,000. ANC 7,000.  NFBS 161; cr.0.8; ca 9.7; Bili 0.6; Liver enzymes normal  CEA < 1.0;  CA 27.29 antigen : 23.2          12/30/2021 :  Hgb 10.6; Hct 31.4; MCV 82; Platelets " 281,000. ANC 6,400.     NFBS 136; Cr.0.8; Ca 9.5; Bili 0.6. Liver enzymea normal. eGFR > 60.   CEA < 1.0; CA 27.29 antigen : 27.5           08/28/2020 : Hgb 10.8; hct 33; MCV 85; Platelets 252,000 ANC 7,700. ;      NFBS 167; Cr.0.9; Ca 10.2; bili 0.6; Liver enzymes normal.       eGFR > 60.  CEA < 1.0. CA 27.29 antigen normal.        12/16/19 : Hgb 10.9; Hct 32.3; MCV 85; Platelets 276,000 ANC 7,700. Serum iron 49; TIBC 311; sat 16 %; NFBS 128; Cr.0.8; ca 10.2; bili 0.6  Liver enzymes normal. eFR . 60.     Mammo 7/7/21 ACR BI-RADS Category 2 - Benign Findings       Lab Results   Component Value Date    WBC 10.62 09/08/2023    HGB 10.7 (L) 09/08/2023    HCT 32.7 (L) 09/08/2023    MCV 83 09/08/2023     09/08/2023 7/2023 mammo  THERE IS NO MAMMOGRAPHIC EVIDENCE OF MALIGNANCY.     A ROUTINE FOLLOW-UP MAMMOGRAM IN 1 YEAR IS RECOMMENDED.     ACR BI-RADS Category 1 - Negative      1. History of breast cancer    2. Anemia, unspecified type                  Assessment & Plan:    Reviewed labs  with patient.   Carcinoma Right Breast  In 2013. Lumpectomy and radiation therapy followed by adjuvant Arimidex therapy.   Breast cancer Index did not show  Any benefit with extended harmonal therapy.   She completed five years of Arimidex in 2018  Mammo 7/14/22  ACR BI-RADS Category 2 - Benign Findings   Follow up Mammo planned 7/2023- benigh  Doing well   Cont to monitor    2. Hb 10.7g/dL -stable  Colonoscopy 2019- benign polyp ascending colon and in rectum  Follow up colonoscopy planned 2024  Fe studies okay except for borderline Fe sats  Start Fe 3x wek  Cbc,cmp  Ferritin, TIBC 3mos  Cb,cmp, and Fe prior to f/u  6mos

## 2023-09-18 ENCOUNTER — TELEPHONE (OUTPATIENT)
Dept: FAMILY MEDICINE | Facility: CLINIC | Age: 68
End: 2023-09-18
Payer: MEDICARE

## 2023-09-18 NOTE — TELEPHONE ENCOUNTER
----- Message from Maynor Ocampo sent at 9/18/2023 11:24 AM CDT -----  Type: Patient Call Back    Who called:Self     What is the request in detail: Asking for a call     Can the clinic reply by [facility]? No     Would the patient rather a call back or a response via [facility]? Call     Best call back number: [phone] (home)

## 2023-09-18 NOTE — TELEPHONE ENCOUNTER
----- Message from Josefa Leos sent at 9/18/2023  2:06 PM CDT -----  Regarding: self [phone]  Type:  Patient Returning Call    Who Called:  self     Who Left Message for Patient:  Juwan     Does the patient know what this is regarding?: returned call.     Would the patient rather a call back or a response via [facility]? Call back     Best Call Back Number: [phone]      Additional Information: Sending [employer] Authorization for Mammo.

## 2023-09-18 NOTE — TELEPHONE ENCOUNTER
----- Message from Bradley Longo sent at 9/18/2023 10:19 AM CDT -----  Regarding: [employer] [phone]  Type: Patient Call Back    Who called:  [employer]     What is the request in detail: called to get a PA for a mammogram that was done back in July.  Stated they need the PA for a mammogram that was done at [facility] on 07/20/23, from [facility] [[email or web address]]- [id number].    Can the clinic reply by [facility]? No     Would the patient rather a call back or a response via [facility]? Call back     Best call back number: [phone]    Additional Information: fax # [phone]    Thank you.

## 2023-09-24 DIAGNOSIS — E11.65 UNCONTROLLED TYPE 2 DIABETES MELLITUS WITH HYPERGLYCEMIA: ICD-10-CM

## 2023-09-24 NOTE — TELEPHONE ENCOUNTER
No care due was identified.  [facility] Embedded Care Due Messages. Reference number: [id number].   9/24/2023 11:18:13 AM CDT

## 2023-09-25 RX ORDER — GLIPIZIDE 10 MG/1
TABLET, FILM COATED, EXTENDED RELEASE ORAL
Qty: 180 TABLET | Refills: 1 | Status: SHIPPED | OUTPATIENT
Start: 2023-09-25

## 2023-09-25 NOTE — TELEPHONE ENCOUNTER
Refill Decision Note   Masha Garcia  is requesting a refill authorization.  Brief Assessment and Rationale for Refill:  Approve     Medication Therapy Plan:         Comments:     Note composed:1:55 PM 09/25/2023             Appointments     Last Visit   8/9/2023 Brenda Bailey MD   Next Visit   2/21/2024 Brenda Bailey MD

## 2023-10-03 ENCOUNTER — PATIENT MESSAGE (OUTPATIENT)
Dept: FAMILY MEDICINE | Facility: CLINIC | Age: 68
End: 2023-10-03
Payer: MEDICARE

## 2023-10-23 DIAGNOSIS — E78.5 HYPERLIPIDEMIA WITH TARGET LDL LESS THAN 100: ICD-10-CM

## 2023-10-23 NOTE — TELEPHONE ENCOUNTER
No care due was identified.  [facility] Embedded Care Due Messages. Reference number: [id number].   10/23/2023 10:25:16 AM CDT

## 2023-10-24 RX ORDER — LOVASTATIN 20 MG/1
TABLET ORAL
Qty: 90 TABLET | Refills: 3 | Status: SHIPPED | OUTPATIENT
Start: 2023-10-24

## 2023-10-24 NOTE — TELEPHONE ENCOUNTER
Refill Decision Note   Mashabryce Garcia  is requesting a refill authorization.  Brief Assessment and Rationale for Refill:  Approve     Medication Therapy Plan:         Comments:     Note composed:10:23 AM 10/24/2023

## 2023-10-26 ENCOUNTER — TELEPHONE (OUTPATIENT)
Dept: FAMILY MEDICINE | Facility: CLINIC | Age: 68
End: 2023-10-26
Payer: MEDICARE

## 2023-10-26 NOTE — TELEPHONE ENCOUNTER
----- Message from Alize Griffith sent at 10/26/2023 12:39 PM CDT -----  Regarding: Courtney [phone]  Type:  Mammogram    Caller is requesting to schedule their annual mammogram appointment.  Order is not listed in EPIC.  Please enter order and contact patient to schedule.    Name of Caller:  Courtney calling from [employer] Claims dept    Would the patient rather a call back or a response via [facility]?     Best Call Back Number: [phone] auth and referral dept    Additional Information: Courtney stated she new referral for mammo, pt had mammo already

## 2023-10-26 NOTE — TELEPHONE ENCOUNTER
"Called [facility] claims department to ask to speak to Courtney. No one seems to know who Courtney is. Spoke with RENITA, he doesn't know the purpose of the call. Called patient to inquire if she knew what the call was in reference to? Patient states "She took her mammogram out of [facility], & now Sumit wants an auth letter form provider sent to the claims department. Patient stated that she has a copy of the letter that was sent, also provider's team sent the letter off to [facility], but the situation has not been resolved as of yet. Questioned patient what was the fax number? Patient doesn't have it with her. Instructed patient to call back with fax number to [facility] so that the letter can be refaxed.   "

## 2023-11-21 ENCOUNTER — TELEPHONE (OUTPATIENT)
Dept: FAMILY MEDICINE | Facility: CLINIC | Age: 68
End: 2023-11-21
Payer: MEDICARE

## 2023-11-21 NOTE — TELEPHONE ENCOUNTER
----- Message from Brenda Bailey MD sent at 11/20/2023 12:35 PM CST -----  Please call patient to complete flu and RSV vaccine.

## 2023-12-12 ENCOUNTER — LAB VISIT (OUTPATIENT)
Dept: LAB | Facility: HOSPITAL | Age: 68
End: 2023-12-12
Attending: INTERNAL MEDICINE
Payer: MEDICARE

## 2023-12-12 DIAGNOSIS — D64.9 ANEMIA, UNSPECIFIED TYPE: ICD-10-CM

## 2023-12-12 LAB
ALBUMIN SERPL BCP-MCNC: 3.4 G/DL (ref 3.5–5.2)
ALP SERPL-CCNC: 60 U/L (ref 55–135)
ALT SERPL W/O P-5'-P-CCNC: 13 U/L (ref 10–44)
ANION GAP SERPL CALC-SCNC: 14 MMOL/L (ref 8–16)
AST SERPL-CCNC: 15 U/L (ref 10–40)
BILIRUB SERPL-MCNC: 0.5 MG/DL (ref 0.1–1)
BUN SERPL-MCNC: 9 MG/DL (ref 8–23)
CALCIUM SERPL-MCNC: 9.8 MG/DL (ref 8.7–10.5)
CHLORIDE SERPL-SCNC: 105 MMOL/L (ref 95–110)
CO2 SERPL-SCNC: 25 MMOL/L (ref 23–29)
CREAT SERPL-MCNC: 1 MG/DL (ref 0.5–1.4)
EST. GFR  (NO RACE VARIABLE): >60 ML/MIN/1.73 M^2
FERRITIN SERPL-MCNC: 29 NG/ML (ref 20–300)
GLUCOSE SERPL-MCNC: 256 MG/DL (ref 70–110)
IRON SERPL-MCNC: 58 UG/DL (ref 30–160)
POTASSIUM SERPL-SCNC: 4.3 MMOL/L (ref 3.5–5.1)
PROT SERPL-MCNC: 7.2 G/DL (ref 6–8.4)
SATURATED IRON: 16 % (ref 20–50)
SODIUM SERPL-SCNC: 144 MMOL/L (ref 136–145)
TOTAL IRON BINDING CAPACITY: 354 UG/DL (ref 250–450)
TRANSFERRIN SERPL-MCNC: 239 MG/DL (ref 200–375)

## 2023-12-12 PROCEDURE — 80053 COMPREHEN METABOLIC PANEL: CPT | Performed by: INTERNAL MEDICINE

## 2023-12-12 PROCEDURE — 85025 COMPLETE CBC W/AUTO DIFF WBC: CPT | Performed by: INTERNAL MEDICINE

## 2023-12-12 PROCEDURE — 84466 ASSAY OF TRANSFERRIN: CPT | Performed by: INTERNAL MEDICINE

## 2023-12-12 PROCEDURE — 36415 COLL VENOUS BLD VENIPUNCTURE: CPT | Mod: PO | Performed by: INTERNAL MEDICINE

## 2023-12-12 PROCEDURE — 83540 ASSAY OF IRON: CPT | Performed by: INTERNAL MEDICINE

## 2023-12-12 PROCEDURE — 82728 ASSAY OF FERRITIN: CPT | Performed by: INTERNAL MEDICINE

## 2023-12-13 LAB
BASOPHILS # BLD AUTO: 0.05 K/UL (ref 0–0.2)
BASOPHILS NFR BLD: 0.5 % (ref 0–1.9)
DIFFERENTIAL METHOD: ABNORMAL
EOSINOPHIL # BLD AUTO: 0.1 K/UL (ref 0–0.5)
EOSINOPHIL NFR BLD: 1.1 % (ref 0–8)
ERYTHROCYTE [DISTWIDTH] IN BLOOD BY AUTOMATED COUNT: 15.9 % (ref 11.5–14.5)
HCT VFR BLD AUTO: 30.7 % (ref 37–48.5)
HGB BLD-MCNC: 10.3 G/DL (ref 12–16)
IMM GRANULOCYTES # BLD AUTO: 0.07 K/UL (ref 0–0.04)
IMM GRANULOCYTES NFR BLD AUTO: 0.7 % (ref 0–0.5)
LYMPHOCYTES # BLD AUTO: 2.7 K/UL (ref 1–4.8)
LYMPHOCYTES NFR BLD: 26.5 % (ref 18–48)
MCH RBC QN AUTO: 27.6 PG (ref 27–31)
MCHC RBC AUTO-ENTMCNC: 33.6 G/DL (ref 32–36)
MCV RBC AUTO: 82 FL (ref 82–98)
MONOCYTES # BLD AUTO: 0.4 K/UL (ref 0.3–1)
MONOCYTES NFR BLD: 4.4 % (ref 4–15)
NEUTROPHILS # BLD AUTO: 6.8 K/UL (ref 1.8–7.7)
NEUTROPHILS NFR BLD: 66.8 % (ref 38–73)
NRBC BLD-RTO: 0 /100 WBC
PLATELET # BLD AUTO: 291 K/UL (ref 150–450)
PMV BLD AUTO: 12 FL (ref 9.2–12.9)
RBC # BLD AUTO: 3.73 M/UL (ref 4–5.4)
WBC # BLD AUTO: 10.09 K/UL (ref 3.9–12.7)

## 2024-01-15 DIAGNOSIS — E89.0 HYPOTHYROIDISM, POSTSURGICAL: ICD-10-CM

## 2024-01-15 NOTE — TELEPHONE ENCOUNTER
No care due was identified.  [facility] Embedded Care Due Messages. Reference number: [id number].   1/15/2024 9:14:37 AM CST

## 2024-01-16 RX ORDER — LEVOTHYROXINE SODIUM 150 UG/1
150 TABLET ORAL
Qty: 90 TABLET | Refills: 0 | Status: SHIPPED | OUTPATIENT
Start: 2024-01-16

## 2024-01-16 NOTE — TELEPHONE ENCOUNTER
Refill Decision Note   Mashabryce Garcia  is requesting a refill authorization.  Brief Assessment and Rationale for Refill:  Approve     Medication Therapy Plan:         Comments:     Note composed:2:11 PM 01/16/2024

## 2024-01-18 ENCOUNTER — TELEPHONE (OUTPATIENT)
Dept: FAMILY MEDICINE | Facility: CLINIC | Age: 69
End: 2024-01-18
Payer: MEDICARE

## 2024-01-18 DIAGNOSIS — E11.65 UNCONTROLLED TYPE 2 DIABETES MELLITUS WITH HYPERGLYCEMIA: Primary | ICD-10-CM

## 2024-01-18 NOTE — TELEPHONE ENCOUNTER
----- Message from Aziza Chanel sent at 1/18/2024 12:07 PM CST -----  .Type: Patient Call Back    Who called: Self     What is the request in detail: Requesting lab orders for upcoming visit     Can the clinic reply by [facility]? No    Would the patient rather a call back or a response via [facility]? Call Back     Best call back number: .[phone] (home)       Additional Information:

## 2024-02-05 ENCOUNTER — OFFICE VISIT (OUTPATIENT)
Dept: OPTOMETRY | Facility: CLINIC | Age: 69
End: 2024-02-05
Payer: MEDICARE

## 2024-02-05 DIAGNOSIS — E11.36 TYPE 2 DIABETES MELLITUS WITH DIABETIC CATARACT, WITHOUT LONG-TERM CURRENT USE OF INSULIN: Primary | ICD-10-CM

## 2024-02-05 DIAGNOSIS — H25.13 NUCLEAR SCLEROSIS OF BOTH EYES: ICD-10-CM

## 2024-02-05 DIAGNOSIS — H52.7 REFRACTIVE ERROR: ICD-10-CM

## 2024-02-05 PROCEDURE — 1101F PT FALLS ASSESS-DOCD LE1/YR: CPT | Mod: HCNC,CPTII,S$GLB, | Performed by: OPTOMETRIST

## 2024-02-05 PROCEDURE — 99999 PR PBB SHADOW E&M-EST. PATIENT-LVL III: CPT | Mod: PBBFAC,HCNC,, | Performed by: OPTOMETRIST

## 2024-02-05 PROCEDURE — 1126F AMNT PAIN NOTED NONE PRSNT: CPT | Mod: HCNC,CPTII,S$GLB, | Performed by: OPTOMETRIST

## 2024-02-05 PROCEDURE — 1159F MED LIST DOCD IN RCRD: CPT | Mod: HCNC,CPTII,S$GLB, | Performed by: OPTOMETRIST

## 2024-02-05 PROCEDURE — 2023F DILAT RTA XM W/O RTNOPTHY: CPT | Mod: HCNC,CPTII,S$GLB, | Performed by: OPTOMETRIST

## 2024-02-05 PROCEDURE — 92014 COMPRE OPH EXAM EST PT 1/>: CPT | Mod: HCNC,S$GLB,, | Performed by: OPTOMETRIST

## 2024-02-05 PROCEDURE — 1160F RVW MEDS BY RX/DR IN RCRD: CPT | Mod: HCNC,CPTII,S$GLB, | Performed by: OPTOMETRIST

## 2024-02-05 PROCEDURE — 3288F FALL RISK ASSESSMENT DOCD: CPT | Mod: HCNC,CPTII,S$GLB, | Performed by: OPTOMETRIST

## 2024-02-05 PROCEDURE — 92015 DETERMINE REFRACTIVE STATE: CPT | Mod: HCNC,S$GLB,, | Performed by: OPTOMETRIST

## 2024-02-05 NOTE — PROGRESS NOTES
Subjective:       Patient ID: Masha Garcia is a 68 y.o. female      Chief Complaint   Patient presents with    Concerns About Ocular Health    Diabetic Eye Exam     History of Present Illness  Dls: 9/20/22 Dr. Lindsay     69 y/o female presents today for diabetic eye exam.   Pt states no changes in vision. Pt wears pal's  Pt wants a new rx for glasses.      x 1 day     No tearing  No itching  No burning  No pain  No ha's  + ou off/on floaters  No flashes    Eye meds  None    Pohx:   None    Fohx:   None    Hemoglobin A1C       Date                     Value               Ref Range             Status                08/05/2023               7.8 (H)             4.0 - 5.6 %           Final                  02/06/2023               7.9 (H)             4.0 - 5.6 %           Final                   08/03/2022               7.2 (H)             4.0 - 5.6 %           Final                 Assessment/Plan:     1. Type 2 diabetes mellitus with diabetic cataract, without long-term current use of insulin  No diabetic retinopathy. Discussed with pt the effects of diabetes on vision, importance of good blood sugar control, compliance with meds, and follow up care with PCP. Return in 1 year for dilated eye exam, sooner PRN.    2. Nuclear sclerosis of both eyes  Educated pt on presence of cataracts and effects on vision. No surgery at this time. Recheck in one year, sooner PRN.    3. Refractive error  Educated patient on refractive error and discussed lens options. Dispensed updated spectacle Rx. Educated about adaptation period to new specs.    Eyeglass Final Rx       Eyeglass Final Rx         Sphere Cylinder Axis Add    Right +1.25 Sphere  +2.75    Left +1.75 +0.75 010 +2.75      Expiration Date: 2/5/2025                      Follow up in about 1 year (around 2/5/2025) for Diabetic Eye Exam.

## 2024-02-06 DIAGNOSIS — E11.65 UNCONTROLLED TYPE 2 DIABETES MELLITUS WITH HYPERGLYCEMIA: ICD-10-CM

## 2024-02-06 RX ORDER — METFORMIN HYDROCHLORIDE 500 MG/1
TABLET, EXTENDED RELEASE ORAL
Qty: 360 TABLET | Refills: 0 | Status: SHIPPED | OUTPATIENT
Start: 2024-02-06 | End: 2024-04-23

## 2024-02-06 NOTE — TELEPHONE ENCOUNTER
Care Due:                  Date            Visit Type   Department     Provider  --------------------------------------------------------------------------------                                [facility]      MED/ INTERNAL  [facility] Kay  Last Visit: 08-      CARE ([facility])   MED/ PEDS      Melissa Bailey                              [facility]/ INTERNAL  Laurentia Kay  Next Visit: 02-      CARE ([facility])   MED/ PEDS      Melissa Bailey                                                            Last  Test          Frequency    Reason                     Performed    Due Date  --------------------------------------------------------------------------------    HBA1C.......  6 months...  TRADJENTA, glipiZIDE,      08- 02-                             metFORMIN................    TSH.........  12 months..  levothyroxine............  02- 02-    [facility] Embedded Care Due Messages. Reference number: [id number].   2/06/2024 10:46:13 AM CST

## 2024-02-06 NOTE — TELEPHONE ENCOUNTER
Refill Routing Note   Medication(s) are not appropriate for processing by [facility] for the following reason(s):        Required labs outdated    [facility] action(s):  Defer     Requires labs : Yes      Medication Therapy Plan: TSH      Appointments  past 12m or future 3m with PCP    Date Provider   Last Visit   8/9/2023 Brenda Bailey MD   Next Visit   2/21/2024 Brenda Bailey MD   ED visits in past 90 days: 0        Note composed:10:52 AM 02/06/2024

## 2024-02-14 ENCOUNTER — LAB VISIT (OUTPATIENT)
Dept: LAB | Facility: HOSPITAL | Age: 69
End: 2024-02-14
Attending: INTERNAL MEDICINE
Payer: MEDICARE

## 2024-02-14 DIAGNOSIS — E11.65 UNCONTROLLED TYPE 2 DIABETES MELLITUS WITH HYPERGLYCEMIA: ICD-10-CM

## 2024-02-14 LAB
ESTIMATED AVG GLUCOSE: 183 MG/DL (ref 68–131)
HBA1C MFR BLD: 8 % (ref 4–5.6)

## 2024-02-14 PROCEDURE — 36415 COLL VENOUS BLD VENIPUNCTURE: CPT | Mod: HCNC,PO | Performed by: INTERNAL MEDICINE

## 2024-02-14 PROCEDURE — 83036 HEMOGLOBIN GLYCOSYLATED A1C: CPT | Mod: HCNC | Performed by: INTERNAL MEDICINE

## 2024-02-21 ENCOUNTER — OFFICE VISIT (OUTPATIENT)
Dept: FAMILY MEDICINE | Facility: CLINIC | Age: 69
End: 2024-02-21
Payer: MEDICARE

## 2024-02-21 VITALS
SYSTOLIC BLOOD PRESSURE: 132 MMHG | WEIGHT: 226.44 LBS | BODY MASS INDEX: 35.54 KG/M2 | TEMPERATURE: 98 F | OXYGEN SATURATION: 98 % | HEART RATE: 69 BPM | HEIGHT: 67 IN | DIASTOLIC BLOOD PRESSURE: 74 MMHG

## 2024-02-21 DIAGNOSIS — E66.01 SEVERE OBESITY (BMI 35.0-35.9 WITH COMORBIDITY): ICD-10-CM

## 2024-02-21 DIAGNOSIS — I10 ESSENTIAL HYPERTENSION: ICD-10-CM

## 2024-02-21 DIAGNOSIS — M85.80 OSTEOPENIA AFTER MENOPAUSE: ICD-10-CM

## 2024-02-21 DIAGNOSIS — E11.65 UNCONTROLLED TYPE 2 DIABETES MELLITUS WITH HYPERGLYCEMIA: ICD-10-CM

## 2024-02-21 DIAGNOSIS — E78.5 HYPERLIPIDEMIA WITH TARGET LDL LESS THAN 100: ICD-10-CM

## 2024-02-21 DIAGNOSIS — Z78.0 OSTEOPENIA AFTER MENOPAUSE: ICD-10-CM

## 2024-02-21 DIAGNOSIS — D57.3 SICKLE CELL TRAIT: ICD-10-CM

## 2024-02-21 DIAGNOSIS — E89.0 HYPOTHYROIDISM, POSTSURGICAL: ICD-10-CM

## 2024-02-21 DIAGNOSIS — E55.9 VITAMIN D DEFICIENCY DISEASE: ICD-10-CM

## 2024-02-21 DIAGNOSIS — Z00.00 ROUTINE MEDICAL EXAM: Primary | ICD-10-CM

## 2024-02-21 DIAGNOSIS — Z23 NEED FOR SHINGLES VACCINE: ICD-10-CM

## 2024-02-21 PROCEDURE — 3008F BODY MASS INDEX DOCD: CPT | Mod: HCNC,CPTII,S$GLB, | Performed by: INTERNAL MEDICINE

## 2024-02-21 PROCEDURE — 1160F RVW MEDS BY RX/DR IN RCRD: CPT | Mod: HCNC,CPTII,S$GLB, | Performed by: INTERNAL MEDICINE

## 2024-02-21 PROCEDURE — 3052F HG A1C>EQUAL 8.0%<EQUAL 9.0%: CPT | Mod: HCNC,CPTII,S$GLB, | Performed by: INTERNAL MEDICINE

## 2024-02-21 PROCEDURE — 99397 PER PM REEVAL EST PAT 65+ YR: CPT | Mod: HCNC,S$GLB,, | Performed by: INTERNAL MEDICINE

## 2024-02-21 PROCEDURE — 3075F SYST BP GE 130 - 139MM HG: CPT | Mod: HCNC,CPTII,S$GLB, | Performed by: INTERNAL MEDICINE

## 2024-02-21 PROCEDURE — 1101F PT FALLS ASSESS-DOCD LE1/YR: CPT | Mod: HCNC,CPTII,S$GLB, | Performed by: INTERNAL MEDICINE

## 2024-02-21 PROCEDURE — 4010F ACE/ARB THERAPY RXD/TAKEN: CPT | Mod: HCNC,CPTII,S$GLB, | Performed by: INTERNAL MEDICINE

## 2024-02-21 PROCEDURE — 3288F FALL RISK ASSESSMENT DOCD: CPT | Mod: HCNC,CPTII,S$GLB, | Performed by: INTERNAL MEDICINE

## 2024-02-21 PROCEDURE — 1159F MED LIST DOCD IN RCRD: CPT | Mod: HCNC,CPTII,S$GLB, | Performed by: INTERNAL MEDICINE

## 2024-02-21 PROCEDURE — 99999 PR PBB SHADOW E&M-EST. PATIENT-LVL V: CPT | Mod: PBBFAC,HCNC,, | Performed by: INTERNAL MEDICINE

## 2024-02-21 PROCEDURE — 1126F AMNT PAIN NOTED NONE PRSNT: CPT | Mod: HCNC,CPTII,S$GLB, | Performed by: INTERNAL MEDICINE

## 2024-02-21 PROCEDURE — 3078F DIAST BP <80 MM HG: CPT | Mod: HCNC,CPTII,S$GLB, | Performed by: INTERNAL MEDICINE

## 2024-02-21 NOTE — PROGRESS NOTES
CHIEF COMPLAINT:   Chief Complaint   Patient presents with    Annual Exam          HISTORY OF PRESENT ILLNESS:  Masha Garcia is a 68 y.o. female who presents to the clinic today for a routine physical exam. Her last physical exam was approximately 1 years(s) ago.    Subjective    PAST MEDICAL HISTORY:  Past Medical History:   Diagnosis Date    AR (allergic rhinitis)     Breast cancer 06/2013    right; followed by oncology, Dr. Oliveira; s/p lumpectomy and  radiation    History of colonic polyps     Hyperlipidemia LDL goal < 100     Hypertension     Lower extremity edema     Mild chronic anemia     Obesity     Osteopenia     Sickle cell trait     Thyroid nodule     Type II or unspecified type diabetes mellitus without mention of complication, uncontrolled     Vitamin D deficiency disease        PAST SURGICAL HISTORY:  Past Surgical History:   Procedure Laterality Date    benign right breast biopsy  2012    lumpectomy right breast  6/2013    right ankle fracture repair      TOTAL ABDOMINAL HYSTERECTOMY      TOTAL THYROIDECTOMY  12/28/2016       SOCIAL HISTORY:  Social History     Socioeconomic History    Marital status:     Number of children: 3   Occupational History    Occupation: retired   Tobacco Use    Smoking status: Never    Smokeless tobacco: Never   Substance and Sexual Activity    Alcohol use: No       FAMILY HISTORY:  Family History   Problem Relation Age of Onset    Diabetes Mother     Hypertension Mother     Hypertension Father     Prostate cancer Father     Diabetes Brother     Cancer Neg Hx     Breast cancer Neg Hx     Colon cancer Neg Hx     Ovarian cancer Neg Hx     Amblyopia Neg Hx     Blindness Neg Hx     Cataracts Neg Hx     Glaucoma Neg Hx     Macular degeneration Neg Hx     Retinal detachment Neg Hx     Strabismus Neg Hx     Stroke Neg Hx     Thyroid disease Neg Hx        ALLERGIES AND MEDICATIONS: updated and reviewed.  Review of patient's allergies indicates:   Allergen Reactions     Fosamax [alendronate] Other (See Comments)     - 'funny feeling'    Ace inhibitors Other (See Comments)     cough    Penicillins Hives     Medication List with Changes/Refills   Current Medications    BLOOD-GLUCOSE METER (TRUE METRIX GLUCOSE METER) KIT    Use to test BID and PRN    CALCIUM CARBONATE-VITAMIN D3 600 MG-5 MCG (200 UNIT) CAP    Take 1 tablet by mouth every morning.     FLASH GLUCOSE SCANNING READER (FREESTYLE LESLIE 10 DAY READER) MISC    Use as directed to check blood sugar tid and prn.    FLASH GLUCOSE SENSOR (FREESTYLE LESLIE 10 DAY SENSOR) KIT    1 Units by Misc.(Non-Drug; Combo Route) route 3 (three) times daily with meals.    GLIPIZIDE (GLUCOTROL) 10 MG TR24    Take 1 tablet by mouth twice daily    HYDROCHLOROTHIAZIDE (HYDRODIURIL) 25 MG TABLET    TAKE 1 TABLET EVERY DAY    LEVOTHYROXINE (SYNTHROID) 150 MCG TABLET    TAKE 1 TABLET BY MOUTH ONCE DAILY BEFORE BREAKFAST    LORATADINE (CLARITIN) 10 MG TABLET    Take 10 mg by mouth daily as needed.     LOSARTAN (COZAAR) 25 MG TABLET    TAKE 1 TABLET EVERY DAY    LOVASTATIN (MEVACOR) 20 MG TABLET    TAKE 1 TABLET EVERY DAY    METFORMIN (GLUCOPHAGE-XR) 500 MG ER 24HR TABLET    TAKE 2 TABLETS TWICE DAILY WITH MEALS    TRADJENTA 5 MG TAB TABLET    Take 1 tablet by mouth once daily    TRUE METRIX GLUCOSE TEST STRIP STRP    TEST BLOOD SUGAR TWO TIMES DAILY AND AS NEEDED          CARE TEAM:  Patient Care Team:  Brenda Bailey MD as PCP - General (Internal Medicine)  Eun Frederick MD as Consulting Physician (Oncology)  Elsy Dubose MD as Consulting Physician (Endocrinology)  Angeles Cao LPN as Care Coordinator  Bobby Vallejo MD as Consulting Physician (Gastroenterology)       SCREENING HISTORY:  Health Maintenance         Date Due Completion Date    Shingles Vaccine (1 of 2) Never done ---    RSV Vaccine (Age 60+ and Pregnant patients) (1 - 1-dose 60+ series) Never done ---    TETANUS VACCINE 07/24/2019 7/24/2009    COVID-19  "Vaccine (4 - 2023-24 season) 09/01/2023 1/7/2022    Foot Exam 02/08/2024 2/8/2023    Override on 2/4/2020: Done (skin dryness to bottom of both feet)    Override on 9/28/2016: Done (normal see note in visit)    DEXA Scan 05/10/2024 5/10/2022    Hemoglobin A1c 05/14/2024 2/14/2024    Colorectal Cancer Screening 07/02/2024 7/2/2019    Mammogram 07/20/2024 7/20/2023    Override on 6/19/2018: Done (imageing center)    Override on 6/15/2016: Done (done at [facility] normal scanned report to chart)    Override on 3/18/2015: Done    Override on 3/1/2014: Done ([facility])    Override on 4/1/2013: Done    Override on 2/1/2012: Done    Diabetes Urine Screening 08/05/2024 8/5/2023    Lipid Panel 08/05/2024 8/5/2023    Eye Exam 02/05/2025 2/5/2024    Override on 12/9/2014: Done    Override on 11/1/2010: Done    Low Dose Statin 02/21/2025 2/21/2024              REVIEW OF SYSTEMS:  The patient reports : fair dietary habits.  The patient reports  : that they do not exercise regularly  Review of Systems   Constitutional:  Negative for chills and fever.   HENT:  Negative for congestion.    Respiratory:  Negative for cough and shortness of breath.    Cardiovascular:  Positive for leg swelling (mild; intermittent).   Gastrointestinal:  Negative for abdominal pain.   Genitourinary:  Negative for dysuria.   Musculoskeletal:  Positive for arthralgias (chronic; stable).      ROS (Optional)-: no pelvic pain  Breast ROS (Optional)-: negative for breast lumps/discharge          Objective    PHYSICAL EXAMINATION/VITALS:  Vitals:    02/21/24 0839   BP: 132/74   Pulse: 69   Temp: 98 °F (36.7 °C)   TempSrc: Oral   SpO2: 98%   Weight: 102.7 kg (226 lb 6.6 oz)   Height: 5' 7" (1.702 m)        Body mass index is 35.46 kg/m².      General appearance - alert, well appearing, and in no distress, obese  Psychiatric - alert, oriented to person, place, and time, normal behavior, speech, dress, motor activity and thought process  Eyes - " pupils equal and reactive, extraocular eye movements intact, sclera anicteric  Neck - supple, no significant adenopathy, carotids upstroke normal bilaterally, no bruits  Lymphatics - no palpable cervical lymphadenopathy  Chest - clear to auscultation, no wheezes, rales or rhonchi, symmetric air entry  Heart - normal rate and regular rhythm  Neurological - alert, normal speech, no focal findings; cranial nerves II through XII intact  Musculoskeletal - patient noted to have Moderate osteoarthritic changes to both knee joints. No joint effusions noted.  Extremities - pedal edema trace  Skin - normal coloration, no suspicious skin lesions  Diabetic Foot Exam -   Protective Sensation (w/ 10 gram monofilament):  Right: Intact  Left: Intact    Visual Inspection:  Normal -  Bilateral    Pedal Pulses:   Right: Present  Left: Present    Posterior Tibialis Pulses:   Right:Present  Left: Present      LABS:  Lab Results   Component Value Date    HGBA1C 8.0 (H) 02/14/2024    HGBA1C 7.8 (H) 08/05/2023    HGBA1C 7.9 (H) 02/06/2023      Lab Results   Component Value Date    CHOL 149 08/05/2023    CHOL 145 08/03/2022    CHOL 138 07/10/2021    CHOL 138 07/10/2021     Lab Results   Component Value Date    LDLCALC 80.6 08/05/2023    LDLCALC 70.2 08/03/2022    LDLCALC 65.0 07/10/2021    LDLCALC 65.0 07/10/2021               ASSESSMENT AND PLAN:   1. Routine medical exam  Counseled on age appropriate medical preventative services including age appropriate cancer screenings, age appropriate eye and dental exams, over all nutritional health, need for a consistent exercise regimen, and an over all push towards maintaining a vigorous and active lifestyle.  Counseled on age appropriate vaccines and discussed upcoming health care needs based on age/gender. Discussed good sleep hygiene and stress management.    2. Uncontrolled type 2 diabetes mellitus with hyperglycemia  Lab Results   Component Value Date    HGBA1C 8.0 (H) 02/14/2024      Diabetes is not controlled at this time (due to hyperglycemia) for age and comorbid conditions.   We discussed diabetic diet and regular exercise.   We discussed home blood sugar monitoring, if appropriate - the patient should test once daily and as needed.   Medication changes were made today: none. Recheck A1c in 3 months.  If her numbers do not improve in 3 months we will discuss starting either long-acting insulin or Trulicity/Ozempic.  She will do better with diabetic diet and regular physical activity.  Diabetic complications addressed: Not applicable.  Patient was counseled on the need for yearly eye exam to screen for/monitor diabetic retinopathy and yearly diabetic foot exam.  -     Hemoglobin A1C; Future; Expected date: 05/21/2024    3. Essential hypertension  BP Readings from Last 1 Encounters:   02/21/24 132/74      The current medical regimen is effective;  continue present plan and medications. Recommended patient to check home readings to monitor and see me for followup as scheduled or sooner as needed.   Discussed sodium restriction, maintaining ideal body weight and regular exercise program as physiologic means to continue to achieve blood pressure control in addition to medication compliance.  Patient was educated that both decongestant and anti-inflammatory medication may raise blood pressure.  The patient is not active on the digital hypertension program.    4. Hyperlipidemia with target LDL less than 100  Lab Results   Component Value Date    CHOL 149 08/05/2023     Lab Results   Component Value Date    HDL 51 08/05/2023     Lab Results   Component Value Date    LDLCALC 80.6 08/05/2023     Lab Results   Component Value Date    TRIG 87 08/05/2023     Lab Results   Component Value Date    LDLCALC 80.6 08/05/2023     We discussed low fat diet and regular exercise.The current medical regimen is effective;  continue present plan and medications.     5. Sickle cell trait  Stable. Asymptomatic.  Observe.    6. Hypothyroidism, postsurgical  Lab Results   Component Value Date    TSH 0.948 02/06/2023     Patient is clinically euthyroid. Continue current regimen.    7. Osteopenia after menopause/8. Vitamin D deficiency disease  We discussed adequate calcium intake (preferably from diet) and vitamin D supplementation. We discussed fall precautions. She is scheduled for her BMD. No need for prescription medication at this time. Further treatment plan will be based on results of bone density testing.  Overview:  - Fosamax made her feel strange -  only took a few times   BMD 2/2020 - No need for Rx tx at this time.   -5/2022 - No need for Rx tx at this time.      Orders:  -     DXA Bone Density Axial Skeleton 1 or more sites; Future; Expected date: 02/21/2024      9. Severe obesity (BMI 35.0-35.9 with comorbidity)  BMI Readings from Last 3 Encounters:   02/21/24 35.46 kg/m²   09/12/23 35.39 kg/m²   08/09/23 35.75 kg/m²     The patient is asked to make an attempt to improve diet and exercise patterns to aid in medical management of this problem.  We discussed documenting daily calorie intake and limiting daily added sugar intake (5g/serving of any product; 20 g/day total).    10. Need for shingles vaccine  Patient was advised to get immunization at the pharmacy.             Orders Placed This Encounter   Procedures    DXA Bone Density Axial Skeleton 1 or more sites    Hemoglobin A1C      FOLLOW UP: Follow up in about 6 months (around 8/21/2024), or if symptoms worsen or fail to improve, for follow up chronic medical conditions.. or sooner as needed.

## 2024-03-12 ENCOUNTER — LAB VISIT (OUTPATIENT)
Dept: LAB | Facility: HOSPITAL | Age: 69
End: 2024-03-12
Attending: INTERNAL MEDICINE
Payer: MEDICARE

## 2024-03-12 DIAGNOSIS — D64.9 ANEMIA, UNSPECIFIED TYPE: ICD-10-CM

## 2024-03-12 LAB
BASOPHILS # BLD AUTO: 0.06 K/UL (ref 0–0.2)
BASOPHILS NFR BLD: 0.6 % (ref 0–1.9)
DIFFERENTIAL METHOD BLD: ABNORMAL
EOSINOPHIL # BLD AUTO: 0.1 K/UL (ref 0–0.5)
EOSINOPHIL NFR BLD: 0.7 % (ref 0–8)
ERYTHROCYTE [DISTWIDTH] IN BLOOD BY AUTOMATED COUNT: 15 % (ref 11.5–14.5)
FERRITIN SERPL-MCNC: 33 NG/ML (ref 20–300)
HCT VFR BLD AUTO: 32.2 % (ref 37–48.5)
HGB BLD-MCNC: 10.5 G/DL (ref 12–16)
IMM GRANULOCYTES # BLD AUTO: 0.04 K/UL (ref 0–0.04)
IMM GRANULOCYTES NFR BLD AUTO: 0.4 % (ref 0–0.5)
IRON SERPL-MCNC: 79 UG/DL (ref 30–160)
LYMPHOCYTES # BLD AUTO: 2.5 K/UL (ref 1–4.8)
LYMPHOCYTES NFR BLD: 26.8 % (ref 18–48)
MCH RBC QN AUTO: 27.3 PG (ref 27–31)
MCHC RBC AUTO-ENTMCNC: 32.6 G/DL (ref 32–36)
MCV RBC AUTO: 84 FL (ref 82–98)
MONOCYTES # BLD AUTO: 0.4 K/UL (ref 0.3–1)
MONOCYTES NFR BLD: 3.8 % (ref 4–15)
NEUTROPHILS # BLD AUTO: 6.4 K/UL (ref 1.8–7.7)
NEUTROPHILS NFR BLD: 67.7 % (ref 38–73)
NRBC BLD-RTO: 0 /100 WBC
PLATELET # BLD AUTO: 250 K/UL (ref 150–450)
PMV BLD AUTO: 12.6 FL (ref 9.2–12.9)
RBC # BLD AUTO: 3.85 M/UL (ref 4–5.4)
SATURATED IRON: 23 % (ref 20–50)
TOTAL IRON BINDING CAPACITY: 351 UG/DL (ref 250–450)
TRANSFERRIN SERPL-MCNC: 237 MG/DL (ref 200–375)
WBC # BLD AUTO: 9.46 K/UL (ref 3.9–12.7)

## 2024-03-12 PROCEDURE — 85025 COMPLETE CBC W/AUTO DIFF WBC: CPT | Mod: HCNC | Performed by: INTERNAL MEDICINE

## 2024-03-12 PROCEDURE — 82728 ASSAY OF FERRITIN: CPT | Mod: HCNC | Performed by: INTERNAL MEDICINE

## 2024-03-12 PROCEDURE — 36415 COLL VENOUS BLD VENIPUNCTURE: CPT | Mod: HCNC,PO | Performed by: INTERNAL MEDICINE

## 2024-03-12 PROCEDURE — 83540 ASSAY OF IRON: CPT | Mod: HCNC | Performed by: INTERNAL MEDICINE

## 2024-03-18 ENCOUNTER — OFFICE VISIT (OUTPATIENT)
Dept: HEMATOLOGY/ONCOLOGY | Facility: CLINIC | Age: 69
End: 2024-03-18
Payer: MEDICARE

## 2024-03-18 VITALS
DIASTOLIC BLOOD PRESSURE: 84 MMHG | BODY MASS INDEX: 35.64 KG/M2 | WEIGHT: 227.06 LBS | HEART RATE: 80 BPM | OXYGEN SATURATION: 97 % | HEIGHT: 67 IN | SYSTOLIC BLOOD PRESSURE: 147 MMHG

## 2024-03-18 DIAGNOSIS — C50.911 MALIGNANT NEOPLASM OF RIGHT BREAST IN FEMALE, ESTROGEN RECEPTOR POSITIVE, UNSPECIFIED SITE OF BREAST: Primary | ICD-10-CM

## 2024-03-18 DIAGNOSIS — D64.9 ANEMIA, UNSPECIFIED TYPE: ICD-10-CM

## 2024-03-18 DIAGNOSIS — Z17.0 MALIGNANT NEOPLASM OF RIGHT BREAST IN FEMALE, ESTROGEN RECEPTOR POSITIVE, UNSPECIFIED SITE OF BREAST: Primary | ICD-10-CM

## 2024-03-18 PROCEDURE — 1159F MED LIST DOCD IN RCRD: CPT | Mod: HCNC,CPTII,S$GLB, | Performed by: INTERNAL MEDICINE

## 2024-03-18 PROCEDURE — 99999 PR PBB SHADOW E&M-EST. PATIENT-LVL III: CPT | Mod: PBBFAC,HCNC,, | Performed by: INTERNAL MEDICINE

## 2024-03-18 PROCEDURE — 1126F AMNT PAIN NOTED NONE PRSNT: CPT | Mod: HCNC,CPTII,S$GLB, | Performed by: INTERNAL MEDICINE

## 2024-03-18 PROCEDURE — 1101F PT FALLS ASSESS-DOCD LE1/YR: CPT | Mod: HCNC,CPTII,S$GLB, | Performed by: INTERNAL MEDICINE

## 2024-03-18 PROCEDURE — 99213 OFFICE O/P EST LOW 20 MIN: CPT | Mod: HCNC,S$GLB,, | Performed by: INTERNAL MEDICINE

## 2024-03-18 PROCEDURE — 3008F BODY MASS INDEX DOCD: CPT | Mod: HCNC,CPTII,S$GLB, | Performed by: INTERNAL MEDICINE

## 2024-03-18 PROCEDURE — 4010F ACE/ARB THERAPY RXD/TAKEN: CPT | Mod: HCNC,CPTII,S$GLB, | Performed by: INTERNAL MEDICINE

## 2024-03-18 PROCEDURE — 3288F FALL RISK ASSESSMENT DOCD: CPT | Mod: HCNC,CPTII,S$GLB, | Performed by: INTERNAL MEDICINE

## 2024-03-18 PROCEDURE — 3077F SYST BP >= 140 MM HG: CPT | Mod: HCNC,CPTII,S$GLB, | Performed by: INTERNAL MEDICINE

## 2024-03-18 PROCEDURE — 3079F DIAST BP 80-89 MM HG: CPT | Mod: HCNC,CPTII,S$GLB, | Performed by: INTERNAL MEDICINE

## 2024-03-18 PROCEDURE — 3052F HG A1C>EQUAL 8.0%<EQUAL 9.0%: CPT | Mod: HCNC,CPTII,S$GLB, | Performed by: INTERNAL MEDICINE

## 2024-03-18 NOTE — PROGRESS NOTES
Chief Complaint :   Breast Cancer    Hx of Present illness :  Patient is a 69 y.o. year old female who presents to the clinic  for oncology followup. dx'd to have Carcinoma Right Breast  In . Lumpectomy and radiation therapy followed by adjuvant Arimidex therapy. Breast cancer Index did not show  Any benefit with extended harmonal therapy.  Completed arimidex therapy in 2018. Since last visit has had colonoscopic exam on 19 : documented to have a single sessile polyp in the ascending colon. polypectpomy done. Documented to have Grade 2 Internal hemmorrhoids.   Completed Covid vaccination.      Interval Hx: Former pt of Dr. Oliveira's   She completed 5yr of AI   No new issues  Doing well  Appetite and weight stable  No SOB/CP  She remains on Fe supp        Mammo 23 ACR BI-RADS Category 1 - Negative       Allergies :    Review of patient's allergies indicates:   Allergen Reactions    Ace inhibitors Other (See Comments)     cough    Penicillins Hives       Occupation : retired .    Transfusion :  None    Menstrual & obstetric Hx :  3; para 3.  Age of menarche: 12  Age of first pregnancy: 18  Lactation history: None  Age of menopause:  Hysterectomy in her late 40's for bleeding  HRT:  For many years.    Present Meds :   Medication List with Changes/Refills   Current Medications    BLOOD-GLUCOSE METER (TRUE METRIX GLUCOSE METER) KIT    Use to test BID and PRN    CALCIUM CARBONATE-VITAMIN D3 600 MG-5 MCG (200 UNIT) CAP    Take 1 tablet by mouth every morning.     FLASH GLUCOSE SCANNING READER (FREESTYLE LESLIE 10 DAY READER) MISC    Use as directed to check blood sugar tid and prn.    FLASH GLUCOSE SENSOR (FREESTYLE LESLIE 10 DAY SENSOR) KIT    1 Units by Misc.(Non-Drug; Combo Route) route 3 (three) times daily with meals.    GLIPIZIDE (GLUCOTROL) 10 MG TR24    Take 1 tablet by mouth twice daily    HYDROCHLOROTHIAZIDE (HYDRODIURIL) 25 MG TABLET    TAKE 1 TABLET EVERY DAY     LEVOTHYROXINE (SYNTHROID) 150 MCG TABLET    TAKE 1 TABLET BY MOUTH ONCE DAILY BEFORE BREAKFAST    LORATADINE (CLARITIN) 10 MG TABLET    Take 10 mg by mouth daily as needed.     LOSARTAN (COZAAR) 25 MG TABLET    TAKE 1 TABLET EVERY DAY    LOVASTATIN (MEVACOR) 20 MG TABLET    TAKE 1 TABLET EVERY DAY    METFORMIN (GLUCOPHAGE-XR) 500 MG ER 24HR TABLET    TAKE 2 TABLETS TWICE DAILY WITH MEALS    TRADJENTA 5 MG TAB TABLET    Take 1 tablet by mouth once daily    TRUE METRIX GLUCOSE TEST STRIP STRP    TEST BLOOD SUGAR TWO TIMES DAILY AND AS NEEDED       Past Medical Hx :  Carcinoma right breast. Diabetes Mellitus type 2; Hypertension; hyperlipidemia; Allergic rhinitis; osteopenia; sickle cell trait; Thyroid nodule. No Hx of hepatitis, TB, Sarcoid, Lupus, rheumatoid arthritis, seizure disorder, CVA, PE or DVT. No prior Hx of Chemotherapy    Past Medical Hx :  Past Medical History:   Diagnosis Date    AR (allergic rhinitis)     Breast cancer 06/2013    right; followed by oncology, Dr. Oliveira; s/p lumpectomy and  radiation    History of colonic polyps     Hyperlipidemia LDL goal < 100     Hypertension     Lower extremity edema     Mild chronic anemia     Obesity     Osteopenia     Sickle cell trait     Thyroid nodule     Type II or unspecified type diabetes mellitus without mention of complication, uncontrolled     Vitamin D deficiency disease        Travel Hx :  N/A    Immunization :  Immunization History   Administered Date(s) Administered    COVID-19, MRNA, LN-S, PF (Pfizer) (Gray Cap) 01/07/2022    COVID-19, MRNA, LN-S, PF (Pfizer) (Purple Cap) 02/19/2021, 03/12/2021    Influenza (FLUAD) - Quadrivalent - Adjuvanted - PF *Preferred* (65+) 10/13/2020, 12/02/2021, 12/11/2023    Influenza - Quadrivalent 10/08/2014    Influenza - Quadrivalent - High Dose - PF (65 years and older) 10/10/2022    Influenza - Quadrivalent - PF *Preferred* (6 months and older) 11/26/2012, 01/10/2014, 09/28/2016, 12/06/2017, 12/12/2018, 02/04/2020     Influenza Split 11/26/2012, 01/10/2014    Pneumococcal Conjugate - 13 Valent 05/04/2016    Pneumococcal Conjugate - 20 Valent 08/09/2023    Pneumococcal Polysaccharide - 23 Valent 08/13/2008, 12/06/2017    Tdap 07/24/2009       Family Hx : reviewed  Family History   Problem Relation Age of Onset    Diabetes Mother     Hypertension Mother     Hypertension Father     Prostate cancer Father     Diabetes Brother     Cancer Neg Hx     Breast cancer Neg Hx     Colon cancer Neg Hx     Ovarian cancer Neg Hx     Amblyopia Neg Hx     Blindness Neg Hx     Cataracts Neg Hx     Glaucoma Neg Hx     Macular degeneration Neg Hx     Retinal detachment Neg Hx     Strabismus Neg Hx     Stroke Neg Hx     Thyroid disease Neg Hx        Social Hx : Reviewed  Social History     Socioeconomic History    Marital status:     Number of children: 3   Occupational History    Occupation: retired   Tobacco Use    Smoking status: Never    Smokeless tobacco: Never   Substance and Sexual Activity    Alcohol use: No       Surgery :  Right Breast Bx; Lumpectomy Right; Tubal ligation; Complete Hysterectomy; Thyroidectomy.  Colonoscopy in 2014.  EGD in  Aug 2018  Colonoscopy 07/02/1`9      Review of Systems   Constitutional:  Negative for fever, malaise/fatigue and weight loss.   HENT:  Negative for congestion, hearing loss, nosebleeds and sore throat.    Eyes:  Negative for blurred vision.        Wears Glasses   Respiratory:  Negative for cough and shortness of breath.    Cardiovascular:  Negative for chest pain and leg swelling.   Gastrointestinal:  Negative for abdominal pain, blood in stool, constipation, diarrhea, heartburn, nausea and vomiting.   Genitourinary:  Negative for dysuria, hematuria and urgency.   Musculoskeletal:  Negative for back pain and neck pain.   Skin:  Negative for itching and rash.   Neurological:  Negative for dizziness, tingling and headaches.   Endo/Heme/Allergies:  Does not bruise/bleed easily.        Mild hot  "flashes   Psychiatric/Behavioral:  Negative for depression and memory loss. The patient is nervous/anxious (Sometimes) and has insomnia.          Vitals:    03/18/24 1108   BP: (!) 147/84   Pulse: 80   SpO2: 97%   Weight: 103 kg (227 lb 1.2 oz)   Height: 5' 7" (1.702 m)               Physical Exam :    Physical Exam   Constitutional: She is oriented to person, place, and time. She appears well-developed and well-nourished.   HENT:   Head: Normocephalic.   Mouth/Throat: Oropharynx is clear and moist.   Eyes: Conjunctivae and lids are normal.No scleral icterus.   Neck: Normal range of motion. Neck supple. No thyromegaly present.   Cardiovascular: Normal rate, regular rhythm and normal heart sounds.    No murmur heard.  Pulmonary/Chest: Breath sounds normal. She has no wheezes. She has no rales.   Abdominal: Soft. Bowel sounds are normal. There is no tenderness. There is no rebound and no guarding.   Musculoskeletal: Normal range of motion. She exhibits no edema and no tenderness.  Breasts- no masses   Lymphadenopathy:     She has no cervical adenopathy.     She has no axillary adenopathy.        Right: No supraclavicular adenopathy present.        Left: No supraclavicular adenopathy present.   Neurological: She is alert and oriented to person, place, and time. No cranial nerve deficit. Coordination normal.   Skin: Skin is warm and dry. No ecchymosis, no petechiae and no rash noted. No erythema.   Psychiatric: She has a normal mood and affect.       Labs & Imaging :   Lab Results   Component Value Date    WBC 9.46 03/12/2024    HGB 10.5 (L) 03/12/2024    HCT 32.2 (L) 03/12/2024    MCV 84 03/12/2024     03/12/2024 05/05/21 : Hgb 10.9; hct 32.6; MCV 84; platelets 257,000. ANC 7,000.  NFBS 161; cr.0.8; ca 9.7; Bili 0.6; Liver enzymes normal  CEA < 1.0;  CA 27.29 antigen : 23.2          12/30/2021 :  Hgb 10.6; Hct 31.4; MCV 82; Platelets 281,000. ANC 6,400.     NFBS 136; Cr.0.8; Ca 9.5; Bili 0.6. Liver " enzymea normal. eGFR > 60.   CEA < 1.0; CA 27.29 antigen : 27.5           08/28/2020 : Hgb 10.8; hct 33; MCV 85; Platelets 252,000 ANC 7,700. ;      NFBS 167; Cr.0.9; Ca 10.2; bili 0.6; Liver enzymes normal.       eGFR > 60.  CEA < 1.0. CA 27.29 antigen normal.        12/16/19 : Hgb 10.9; Hct 32.3; MCV 85; Platelets 276,000 ANC 7,700. Serum iron 49; TIBC 311; sat 16 %; NFBS 128; Cr.0.8; ca 10.2; bili 0.6  Liver enzymes normal. eFR . 60.     Mammo 7/7/21 ACR BI-RADS Category 2 - Benign Findings       Lab Results   Component Value Date    WBC 9.46 03/12/2024    HGB 10.5 (L) 03/12/2024    HCT 32.2 (L) 03/12/2024    MCV 84 03/12/2024     03/12/2024 7/2023 mammo  THERE IS NO MAMMOGRAPHIC EVIDENCE OF MALIGNANCY.     A ROUTINE FOLLOW-UP MAMMOGRAM IN 1 YEAR IS RECOMMENDED.     ACR BI-RADS Category 1 - Negative      1. History of breast cancer    2. Anemia, unspecified type            Assessment & Plan:    Reviewed labs  with patient.   Carcinoma Right Breast  In 2013. Lumpectomy and radiation therapy followed by adjuvant Arimidex therapy.   Breast cancer Index did not show  Any benefit with extended harmonal therapy.   She completed five years of Arimidex in 2018    Follow up Mammo planned 7/2023- benign  Follow up mammo planned 7/2024   Doing well   Cont to monitor    2. Hb 10.5g/dL on 3/12/24  Colonoscopy 2019- benign polyp ascending colon and in rectum  Follow up colonoscopy planned 2024  Fe studies improved    Cbc,cmp  Ferritin, TIBC 3mos  Cb,cmp, and Fe prior to f/u  6mos

## 2024-04-22 DIAGNOSIS — E11.65 UNCONTROLLED TYPE 2 DIABETES MELLITUS WITH HYPERGLYCEMIA: ICD-10-CM

## 2024-04-23 RX ORDER — METFORMIN HYDROCHLORIDE 500 MG/1
TABLET, EXTENDED RELEASE ORAL
Qty: 360 TABLET | Refills: 1 | Status: SHIPPED | OUTPATIENT
Start: 2024-04-23

## 2024-04-23 NOTE — TELEPHONE ENCOUNTER
Refill Decision Note   Mashabryce Garcia  is requesting a refill authorization.  Brief Assessment and Rationale for Refill:  Approve     Medication Therapy Plan:         Comments:     Note composed:3:15 PM 04/23/2024

## 2024-04-23 NOTE — TELEPHONE ENCOUNTER
No care due was identified.  [facility] Embedded Care Due Messages. Reference number: [id number].   4/22/2024 7:11:34 PM CDT

## 2024-05-10 ENCOUNTER — HOSPITAL ENCOUNTER (OUTPATIENT)
Dept: RADIOLOGY | Facility: CLINIC | Age: 69
Discharge: HOME | End: 2024-05-10
Attending: INTERNAL MEDICINE
Payer: MEDICARE

## 2024-05-10 DIAGNOSIS — Z78.0 OSTEOPENIA AFTER MENOPAUSE: ICD-10-CM

## 2024-05-10 DIAGNOSIS — M85.80 OSTEOPENIA AFTER MENOPAUSE: ICD-10-CM

## 2024-05-10 PROCEDURE — 77080 DXA BONE DENSITY AXIAL: CPT | Mod: TC,HCNC,PO

## 2024-05-10 PROCEDURE — 77080 DXA BONE DENSITY AXIAL: CPT | Mod: 26,HCNC,, | Performed by: INTERNAL MEDICINE

## 2024-05-21 ENCOUNTER — LAB VISIT (OUTPATIENT)
Dept: LAB | Facility: HOSPITAL | Age: 69
End: 2024-05-21
Attending: INTERNAL MEDICINE
Payer: MEDICARE

## 2024-05-21 DIAGNOSIS — E11.65 UNCONTROLLED TYPE 2 DIABETES MELLITUS WITH HYPERGLYCEMIA: ICD-10-CM

## 2024-05-21 LAB
ESTIMATED AVG GLUCOSE: 189 MG/DL (ref 68–131)
HBA1C MFR BLD: 8.2 % (ref 4–5.6)

## 2024-05-21 PROCEDURE — 83036 HEMOGLOBIN GLYCOSYLATED A1C: CPT | Mod: HCNC | Performed by: INTERNAL MEDICINE

## 2024-05-21 PROCEDURE — 36415 COLL VENOUS BLD VENIPUNCTURE: CPT | Mod: HCNC,PO | Performed by: INTERNAL MEDICINE

## 2024-05-22 ENCOUNTER — TELEPHONE (OUTPATIENT)
Dept: FAMILY MEDICINE | Facility: CLINIC | Age: 69
End: 2024-05-22
Payer: MEDICARE

## 2024-05-22 NOTE — TELEPHONE ENCOUNTER
Please call patient: unfortunately, her diabetes control has worsened (A1c 8.2). She can come and see Codie Diana NP to discuss medication adjustments or see a diabetes specialist (Sophie Medley NP). Please schedule her accordingly.

## 2024-06-11 ENCOUNTER — TELEPHONE (OUTPATIENT)
Dept: FAMILY MEDICINE | Facility: CLINIC | Age: 69
End: 2024-06-11
Payer: MEDICARE

## 2024-06-11 DIAGNOSIS — Z12.11 ENCOUNTER FOR SCREENING COLONOSCOPY: Primary | ICD-10-CM

## 2024-06-11 NOTE — TELEPHONE ENCOUNTER
----- Message from Mendez Levi sent at 6/11/2024  9:46 AM CDT -----  Regarding: self  Type: Patient Call Back    Who called:self    What is the request in detail:pt is calling to get a referral to get her colonoscopy done   Bobby salas    Can the clinic reply by [facility]?no    Would the patient rather a call back or a response via [facility]? callback    Best call back number:[phone]    Additional Information:

## 2024-06-18 ENCOUNTER — LAB VISIT (OUTPATIENT)
Dept: LAB | Facility: HOSPITAL | Age: 69
End: 2024-06-18
Payer: MEDICARE

## 2024-06-18 DIAGNOSIS — D64.9 ANEMIA, UNSPECIFIED TYPE: ICD-10-CM

## 2024-06-18 LAB
ALBUMIN SERPL BCP-MCNC: 3.4 G/DL (ref 3.5–5.2)
ALP SERPL-CCNC: 59 U/L (ref 55–135)
ALT SERPL W/O P-5'-P-CCNC: 15 U/L (ref 10–44)
ANION GAP SERPL CALC-SCNC: 15 MMOL/L (ref 8–16)
AST SERPL-CCNC: 16 U/L (ref 10–40)
BASOPHILS # BLD AUTO: 0.05 K/UL (ref 0–0.2)
BASOPHILS NFR BLD: 0.5 % (ref 0–1.9)
BILIRUB SERPL-MCNC: 0.5 MG/DL (ref 0.1–1)
BUN SERPL-MCNC: 13 MG/DL (ref 8–23)
CALCIUM SERPL-MCNC: 10.1 MG/DL (ref 8.7–10.5)
CHLORIDE SERPL-SCNC: 107 MMOL/L (ref 95–110)
CO2 SERPL-SCNC: 21 MMOL/L (ref 23–29)
CREAT SERPL-MCNC: 1.1 MG/DL (ref 0.5–1.4)
DIFFERENTIAL METHOD BLD: ABNORMAL
EOSINOPHIL # BLD AUTO: 0.2 K/UL (ref 0–0.5)
EOSINOPHIL NFR BLD: 1.5 % (ref 0–8)
ERYTHROCYTE [DISTWIDTH] IN BLOOD BY AUTOMATED COUNT: 15.8 % (ref 11.5–14.5)
EST. GFR  (NO RACE VARIABLE): 54.4 ML/MIN/1.73 M^2
FERRITIN SERPL-MCNC: 28 NG/ML (ref 20–300)
GLUCOSE SERPL-MCNC: 247 MG/DL (ref 70–110)
HCT VFR BLD AUTO: 30.6 % (ref 37–48.5)
HGB BLD-MCNC: 10.2 G/DL (ref 12–16)
IMM GRANULOCYTES # BLD AUTO: 0.06 K/UL (ref 0–0.04)
IMM GRANULOCYTES NFR BLD AUTO: 0.6 % (ref 0–0.5)
IRON SERPL-MCNC: 64 UG/DL (ref 30–160)
LYMPHOCYTES # BLD AUTO: 3 K/UL (ref 1–4.8)
LYMPHOCYTES NFR BLD: 28 % (ref 18–48)
MCH RBC QN AUTO: 28.4 PG (ref 27–31)
MCHC RBC AUTO-ENTMCNC: 33.3 G/DL (ref 32–36)
MCV RBC AUTO: 85 FL (ref 82–98)
MONOCYTES # BLD AUTO: 0.5 K/UL (ref 0.3–1)
MONOCYTES NFR BLD: 4.7 % (ref 4–15)
NEUTROPHILS # BLD AUTO: 6.8 K/UL (ref 1.8–7.7)
NEUTROPHILS NFR BLD: 64.7 % (ref 38–73)
NRBC BLD-RTO: 0 /100 WBC
PLATELET # BLD AUTO: 293 K/UL (ref 150–450)
PMV BLD AUTO: 11.9 FL (ref 9.2–12.9)
POTASSIUM SERPL-SCNC: 3.6 MMOL/L (ref 3.5–5.1)
PROT SERPL-MCNC: 7.1 G/DL (ref 6–8.4)
RBC # BLD AUTO: 3.59 M/UL (ref 4–5.4)
SATURATED IRON: 18 % (ref 20–50)
SODIUM SERPL-SCNC: 143 MMOL/L (ref 136–145)
TOTAL IRON BINDING CAPACITY: 358 UG/DL (ref 250–450)
TRANSFERRIN SERPL-MCNC: 242 MG/DL (ref 200–375)
WBC # BLD AUTO: 10.54 K/UL (ref 3.9–12.7)

## 2024-06-18 PROCEDURE — 80053 COMPREHEN METABOLIC PANEL: CPT | Mod: HCNC | Performed by: INTERNAL MEDICINE

## 2024-06-18 PROCEDURE — 83540 ASSAY OF IRON: CPT | Mod: HCNC | Performed by: INTERNAL MEDICINE

## 2024-06-18 PROCEDURE — 82728 ASSAY OF FERRITIN: CPT | Mod: HCNC | Performed by: INTERNAL MEDICINE

## 2024-06-18 PROCEDURE — 85025 COMPLETE CBC W/AUTO DIFF WBC: CPT | Mod: HCNC | Performed by: INTERNAL MEDICINE

## 2024-06-18 PROCEDURE — 36415 COLL VENOUS BLD VENIPUNCTURE: CPT | Mod: HCNC,PO | Performed by: INTERNAL MEDICINE

## 2024-06-25 ENCOUNTER — OFFICE VISIT (OUTPATIENT)
Dept: FAMILY MEDICINE | Facility: CLINIC | Age: 69
End: 2024-06-25
Payer: MEDICARE

## 2024-06-25 VITALS
HEIGHT: 67 IN | BODY MASS INDEX: 35.5 KG/M2 | WEIGHT: 226.19 LBS | DIASTOLIC BLOOD PRESSURE: 84 MMHG | SYSTOLIC BLOOD PRESSURE: 124 MMHG | RESPIRATION RATE: 20 BRPM | TEMPERATURE: 98 F | OXYGEN SATURATION: 98 % | HEART RATE: 68 BPM

## 2024-06-25 DIAGNOSIS — E66.9 OBESITY (BMI 30-39.9): ICD-10-CM

## 2024-06-25 DIAGNOSIS — E11.65 UNCONTROLLED TYPE 2 DIABETES MELLITUS WITH HYPERGLYCEMIA: Primary | ICD-10-CM

## 2024-06-25 DIAGNOSIS — I10 ESSENTIAL HYPERTENSION: ICD-10-CM

## 2024-06-25 DIAGNOSIS — E78.5 HYPERLIPIDEMIA ASSOCIATED WITH TYPE 2 DIABETES MELLITUS: ICD-10-CM

## 2024-06-25 DIAGNOSIS — E11.69 HYPERLIPIDEMIA ASSOCIATED WITH TYPE 2 DIABETES MELLITUS: ICD-10-CM

## 2024-06-25 PROCEDURE — 3079F DIAST BP 80-89 MM HG: CPT | Mod: HCNC,CPTII,S$GLB, | Performed by: FAMILY MEDICINE

## 2024-06-25 PROCEDURE — 3288F FALL RISK ASSESSMENT DOCD: CPT | Mod: HCNC,CPTII,S$GLB, | Performed by: FAMILY MEDICINE

## 2024-06-25 PROCEDURE — 99214 OFFICE O/P EST MOD 30 MIN: CPT | Mod: HCNC,S$GLB,, | Performed by: FAMILY MEDICINE

## 2024-06-25 PROCEDURE — 1159F MED LIST DOCD IN RCRD: CPT | Mod: HCNC,CPTII,S$GLB, | Performed by: FAMILY MEDICINE

## 2024-06-25 PROCEDURE — 1101F PT FALLS ASSESS-DOCD LE1/YR: CPT | Mod: HCNC,CPTII,S$GLB, | Performed by: FAMILY MEDICINE

## 2024-06-25 PROCEDURE — 3052F HG A1C>EQUAL 8.0%<EQUAL 9.0%: CPT | Mod: HCNC,CPTII,S$GLB, | Performed by: FAMILY MEDICINE

## 2024-06-25 PROCEDURE — 99999 PR PBB SHADOW E&M-EST. PATIENT-LVL IV: CPT | Mod: PBBFAC,HCNC,, | Performed by: FAMILY MEDICINE

## 2024-06-25 PROCEDURE — 3074F SYST BP LT 130 MM HG: CPT | Mod: HCNC,CPTII,S$GLB, | Performed by: FAMILY MEDICINE

## 2024-06-25 PROCEDURE — 3008F BODY MASS INDEX DOCD: CPT | Mod: HCNC,CPTII,S$GLB, | Performed by: FAMILY MEDICINE

## 2024-06-25 PROCEDURE — 1160F RVW MEDS BY RX/DR IN RCRD: CPT | Mod: HCNC,CPTII,S$GLB, | Performed by: FAMILY MEDICINE

## 2024-06-25 PROCEDURE — 1126F AMNT PAIN NOTED NONE PRSNT: CPT | Mod: HCNC,CPTII,S$GLB, | Performed by: FAMILY MEDICINE

## 2024-06-25 PROCEDURE — 4010F ACE/ARB THERAPY RXD/TAKEN: CPT | Mod: HCNC,CPTII,S$GLB, | Performed by: FAMILY MEDICINE

## 2024-06-25 RX ORDER — DULAGLUTIDE 0.75 MG/.5ML
0.75 INJECTION, SOLUTION SUBCUTANEOUS
Qty: 4 PEN | Refills: 4 | Status: SHIPPED | OUTPATIENT
Start: 2024-06-25 | End: 2025-06-25

## 2024-07-01 NOTE — PROGRESS NOTES
"  Physical Exam  /84 (BP Location: Left arm, Patient Position: Sitting, BP Method: Medium (Manual))   Pulse 68   Temp 98.4 °F (36.9 °C) (Oral)   Resp 20   Ht 5' 7" (1.702 m)   Wt 102.6 kg (226 lb 3.1 oz)   SpO2 98%   BMI 35.43 kg/m²      Office Visit    Patient Name: Masha Garcia    : 1955  MRN: [id number]      Assessment/Plan:  Masha Garcia is a 69 y.o. female who presents today for :    Uncontrolled type 2 diabetes mellitus with hyperglycemia  -     dulaglutide (TRULICITY) 0.75 mg/0.5 mL pen injector; Inject 0.75 mg into the skin every 7 days.  Dispense: 4 pen ; Refill: 4  -     Hemoglobin A1C; Future; Expected date: 2024  -     Basic Metabolic Panel; Future; Expected date: 2024  Hyperlipidemia associated with type 2 diabetes mellitus  -previous A1c reviewed:   Lab Results   Component Value Date    HGBA1C 8.2 (H) 2024     -start Trulicity, prior PCP note reviewed - aim for goal of A1c<7%  -reviewed with patient about routine diabetic care that includes but are not limited to regular eye exams, skin care, daily foot exam, proper nutrition, regular BG monitoring at home (fasting and mealtime) and medication compliance in a diabetic.  Target morning BS  and meal-time BS <180  -weight loss and regular physical excercise    Essential hypertension  Obesity (BMI 30-39.9)  -continue current medication regimen  -DASH diet, regular cardiovascular exercises  -weight loss          Follow up 3 months for diabetic check    This note was created by combination of typed  and MModal dictation.  Transcription errors may be present.  If there are any questions, please contact me.      ----------------------------------------------------------------------------------------------------------------------      HPI:  Patient Care Team:  Brenda Bailey MD as PCP - General (Internal Medicine)  Eun Frederick MD as Consulting Physician (Oncology)  Elsy Dubose MD " as Consulting Physician (Endocrinology)  Angeles Cao LPN as Care Coordinator  Bobby Vallejo MD as Consulting Physician (Gastroenterology)    Masha is a 69 y.o. female with      Patient Active Problem List   Diagnosis    Essential hypertension    Hyperlipidemia with target LDL less than 100    Uncontrolled type 2 diabetes mellitus with hyperglycemia    Severe obesity (BMI 35.0-35.9 with comorbidity)    Osteopenia after menopause    Vitamin D deficiency disease    History of colonic polyps    HX: breast cancer    Sickle cell trait    Hypothyroidism, postsurgical    Anemia, unspecified    Ganglion cyst of finger of right hand    Refractive error    Nuclear sclerosis of both eyes       Masha presents today for:  Follow-up (DM)    She was not able to get in to see her PCP, whom she last saw about 4 months ago. Patient was told to f/u in 3 months to check on her diabetes, which is still uncontrolled and had increased slightly up to 8.2%. she otherwise reports compliance with her daily Metformin and Glipizide without any side effects. She was prescribed Tradjenta previously but couldn't afford the medication. Her FBG at home had been in the 150-200 range. She denies any polyuria/polydipsia. No foot numbness/tingling/vision changes/hypoglycemia. Her BP is controlled on current regimen.           Hemoglobin A1C   Date Value Ref Range Status   05/21/2024 8.2 (H) 4.0 - 5.6 % Final     Comment:     ADA Screening Guidelines:  5.7-6.4%  Consistent with prediabetes  >or=6.5%  Consistent with diabetes    High levels of fetal hemoglobin interfere with the HbA1C  assay. Heterozygous hemoglobin variants (HbS, HgC, etc)do  not significantly interfere with this assay.   However, presence of multiple variants may affect accuracy.     02/14/2024 8.0 (H) 4.0 - 5.6 % Final     Comment:     ADA Screening Guidelines:  5.7-6.4%  Consistent with prediabetes  >or=6.5%  Consistent with diabetes    High levels of fetal  hemoglobin interfere with the HbA1C  assay. Heterozygous hemoglobin variants (HbS, HgC, etc)do  not significantly interfere with this assay.   However, presence of multiple variants may affect accuracy.     08/05/2023 7.8 (H) 4.0 - 5.6 % Final     Comment:     ADA Screening Guidelines:  5.7-6.4%  Consistent with prediabetes  >or=6.5%  Consistent with diabetes    High levels of fetal hemoglobin interfere with the HbA1C  assay. Heterozygous hemoglobin variants (HbS, HgC, etc)do  not significantly interfere with this assay.   However, presence of multiple variants may affect accuracy.             Additional ROS      Negative review of system  except per HPI             Patient Active Problem List   Diagnosis    Essential hypertension    Hyperlipidemia with target LDL less than 100    Uncontrolled type 2 diabetes mellitus with hyperglycemia    Severe obesity (BMI 35.0-35.9 with comorbidity)    Osteopenia after menopause    Vitamin D deficiency disease    History of colonic polyps    HX: breast cancer    Sickle cell trait    Hypothyroidism, postsurgical    Anemia, unspecified    Ganglion cyst of finger of right hand    Refractive error    Nuclear sclerosis of both eyes       Current Medications  Medications reviewed/updated.     Current Outpatient Medications on File Prior to Visit   Medication Sig Dispense Refill    blood-glucose meter (TRUE METRIX GLUCOSE METER) kit Use to test BID and PRN 1 each 0    calcium carbonate-vitamin D3 600 mg-5 mcg (200 unit) Cap Take 1 tablet by mouth every morning.       flash glucose scanning reader (FREESTYLE LESLIE 10 DAY READER) Misc Use as directed to check blood sugar tid and prn. 1 each 0    flash glucose sensor (FREESTYLE LESLIE 10 DAY SENSOR) Kit 1 Units by Misc.(Non-Drug; Combo Route) route 3 (three) times daily with meals. 1 kit 11    glipiZIDE (GLUCOTROL) 10 MG TR24 Take 1 tablet by mouth twice daily 180 tablet 1    hydroCHLOROthiazide (HYDRODIURIL) 25 MG tablet TAKE 1 TABLET  EVERY DAY 90 tablet 2    levothyroxine (SYNTHROID) 150 MCG tablet TAKE 1 TABLET BY MOUTH ONCE DAILY BEFORE BREAKFAST 90 tablet 0    loratadine (CLARITIN) 10 mg tablet Take 10 mg by mouth daily as needed.       losartan (COZAAR) 25 MG tablet TAKE 1 TABLET EVERY DAY 90 tablet 2    lovastatin (MEVACOR) 20 MG tablet TAKE 1 TABLET EVERY DAY 90 tablet 3    metFORMIN (GLUCOPHAGE-XR) 500 MG ER 24hr tablet TAKE 2 TABLETS TWICE DAILY WITH MEALS 360 tablet 1    TRADJENTA 5 mg Tab tablet Take 1 tablet by mouth once daily 90 tablet 1    TRUE METRIX GLUCOSE TEST STRIP Strp TEST BLOOD SUGAR TWO TIMES DAILY AND AS NEEDED 300 strip 3     No current facility-administered medications on file prior to visit.           Past Surgical History:   Procedure Laterality Date    benign right breast biopsy  2012    lumpectomy right breast  6/2013    right ankle fracture repair      TOTAL ABDOMINAL HYSTERECTOMY      TOTAL THYROIDECTOMY  12/28/2016       Family History   Problem Relation Name Age of Onset    Diabetes Mother      Hypertension Mother      Hypertension Father      Prostate cancer Father      Diabetes Brother Augie     Cancer Neg Hx      Breast cancer Neg Hx      Colon cancer Neg Hx      Ovarian cancer Neg Hx      Amblyopia Neg Hx      Blindness Neg Hx      Cataracts Neg Hx      Glaucoma Neg Hx      Macular degeneration Neg Hx      Retinal detachment Neg Hx      Strabismus Neg Hx      Stroke Neg Hx      Thyroid disease Neg Hx         Social History     Socioeconomic History    Marital status:     Number of children: 3   Occupational History    Occupation: retired   Tobacco Use    Smoking status: Never    Smokeless tobacco: Never   Substance and Sexual Activity    Alcohol use: No     Social Determinants of Health     Financial Resource Strain: Low Risk  (5/9/2024)    Overall Financial Resource Strain (CARDIA)     Difficulty of Paying Living Expenses: Not hard at all   Food Insecurity: No Food Insecurity (5/9/2024)    Hunger  "Vital Sign     Worried About Running Out of Food in the Last Year: Never true     Ran Out of Food in the Last Year: Never true   Transportation Needs: No Transportation Needs (5/9/2024)    PRAPARE - Transportation     Lack of Transportation (Medical): No     Lack of Transportation (Non-Medical): No   Physical Activity: Unknown (5/9/2024)    Exercise Vital Sign     Days of Exercise per Week: 3 days   Stress: Patient Declined (5/9/2024)    [place] [employer] - Occupational Stress Questionnaire     Feeling of Stress : Patient declined   Housing Stability: Unknown (5/9/2024)    Housing Stability Vital Sign     Unable to Pay for Housing in the Last Year: No             Allergies   Review of patient's allergies indicates:   Allergen Reactions    Fosamax [alendronate] Other (See Comments)     - 'funny feeling'    Ace inhibitors Other (See Comments)     cough    Penicillins Hives             Review of Systems  See HPI      [unfilled]  /84 (BP Location: Left arm, Patient Position: Sitting, BP Method: Medium (Manual))   Pulse 68   Temp 98.4 °F (36.9 °C) (Oral)   Resp 20   Ht 5' 7" (1.702 m)   Wt 102.6 kg (226 lb 3.1 oz)   SpO2 98%   BMI 35.43 kg/m²       GEN: NAD, pleasant, obese   HEENT: NCAT, PERRLA, EOMI, sclera clear, anicteric, O/P clear, MMM with no lesions  NECK: normal, supple with midline trachea, no LAD, no thyromegaly  LUNGS: CTAB, no w/r/r, normal respiratory effort  HEART: RRR, normal S1 and S2, no m/r/g, no palpitations, no edema  ABD: s/nt/nd, NABS, no organomegaly  SKIN: warm and dry with normal turgor, no rashes, no other lesions.   PSYCH: AOx3, appropriate mood and affect.   MSK: extremities warm/well perfused, normal ROM in all 4 extremities, no c/c/e.   NEURO: normal without focal findings, CN II-XII are intact      "

## 2024-07-23 ENCOUNTER — TELEPHONE (OUTPATIENT)
Dept: FAMILY MEDICINE | Facility: CLINIC | Age: 69
End: 2024-07-23
Payer: MEDICARE

## 2024-07-23 DIAGNOSIS — Z12.31 ENCOUNTER FOR SCREENING MAMMOGRAM FOR BREAST CANCER: Primary | ICD-10-CM

## 2024-07-23 NOTE — TELEPHONE ENCOUNTER
Spoke with patient. Patient requesting an external order for mammogram. Patient requesting order to be done at cancer center since previous diagnosis. External mammogram order was placed. Patient will call back with fax number.

## 2024-07-23 NOTE — TELEPHONE ENCOUNTER
----- Message from Leigh Okeefe sent at 7/23/2024 11:51 AM CDT -----  Regarding: Request for Annual Mammogram  Type:  Mammogram    Caller is requesting to schedule their annual mammogram appointment.  Order is not listed in EPIC.  Please enter order and contact patient to schedule.    Name of Caller: Self     Where would they like the mammogram performed?  [facility] Aric Lemus     Would the patient rather a call back or a response via [facility]? Call     Best Call Back Number: .[phone]

## 2024-07-23 NOTE — TELEPHONE ENCOUNTER
----- Message from Aziza Chanel sent at 7/23/2024  3:10 PM CDT -----  .Type: Patient Call Back    Who called: Self     What is the request in detail: Calling to give Juwan the [facility] number [facility] [phone]. Ask that Juwan give her a call     Can the clinic reply by [facility]? No    Would the patient rather a call back or a response via [facility]? Call Back     Best call back number: .[phone] (home)       Additional Information:

## 2024-08-01 ENCOUNTER — HOSPITAL ENCOUNTER (OUTPATIENT)
Dept: RADIOLOGY | Facility: HOSPITAL | Age: 69
Discharge: HOME | End: 2024-08-01
Attending: SURGERY
Payer: MEDICARE

## 2024-08-01 DIAGNOSIS — Z12.31 VISIT FOR SCREENING MAMMOGRAM: ICD-10-CM

## 2024-08-01 PROCEDURE — 77063 BREAST TOMOSYNTHESIS BI: CPT | Mod: TC,HCNC

## 2024-08-01 PROCEDURE — 77067 SCR MAMMO BI INCL CAD: CPT | Mod: TC,HCNC

## 2024-08-06 LAB — CRC RECOMMENDATION EXT: NORMAL

## 2024-08-07 DIAGNOSIS — E11.9 TYPE 2 DIABETES MELLITUS WITHOUT COMPLICATION: ICD-10-CM

## 2024-08-13 ENCOUNTER — TELEPHONE (OUTPATIENT)
Dept: FAMILY MEDICINE | Facility: CLINIC | Age: 69
End: 2024-08-13
Payer: MEDICARE

## 2024-08-13 DIAGNOSIS — E89.0 HYPOTHYROIDISM, POSTSURGICAL: ICD-10-CM

## 2024-08-13 NOTE — TELEPHONE ENCOUNTER
Noted.  She has an upcoming appointment with me later this month at which time we can check labs and discuss further.

## 2024-08-13 NOTE — TELEPHONE ENCOUNTER
----- Message from Antoinette Ureña sent at 8/13/2024 12:36 PM CDT -----  Regarding: call back  Type: Patient Call Back    Who called: pt     What is the request in detail: calling to inform clinic about diabetic medication regimen, was prescribed Trulicity by another provider and her insurance is refusing coverage for her Tradjenta so she is only taking Trulicity     Can the clinic reply by [facility]?no    Would the patient rather a call back or a response via [facility]? call    Best call back number: [phone]     Additional Information:

## 2024-08-21 ENCOUNTER — LAB VISIT (OUTPATIENT)
Dept: LAB | Facility: HOSPITAL | Age: 69
End: 2024-08-21
Attending: FAMILY MEDICINE
Payer: MEDICARE

## 2024-08-21 DIAGNOSIS — E11.65 UNCONTROLLED TYPE 2 DIABETES MELLITUS WITH HYPERGLYCEMIA: ICD-10-CM

## 2024-08-21 DIAGNOSIS — E11.9 TYPE 2 DIABETES MELLITUS WITHOUT COMPLICATION: ICD-10-CM

## 2024-08-21 LAB
ANION GAP SERPL CALC-SCNC: 9 MMOL/L (ref 8–16)
BUN SERPL-MCNC: 16 MG/DL (ref 8–23)
CALCIUM SERPL-MCNC: 9.7 MG/DL (ref 8.7–10.5)
CHLORIDE SERPL-SCNC: 106 MMOL/L (ref 95–110)
CHOLEST SERPL-MCNC: 137 MG/DL (ref 120–199)
CHOLEST/HDLC SERPL: 2.9 {RATIO} (ref 2–5)
CO2 SERPL-SCNC: 26 MMOL/L (ref 23–29)
CREAT SERPL-MCNC: 0.9 MG/DL (ref 0.5–1.4)
EST. GFR  (NO RACE VARIABLE): >60 ML/MIN/1.73 M^2
ESTIMATED AVG GLUCOSE: 171 MG/DL (ref 68–131)
GLUCOSE SERPL-MCNC: 107 MG/DL (ref 70–110)
HBA1C MFR BLD: 7.6 % (ref 4–5.6)
HDLC SERPL-MCNC: 47 MG/DL (ref 40–75)
HDLC SERPL: 34.3 % (ref 20–50)
LDLC SERPL CALC-MCNC: 67.4 MG/DL (ref 63–159)
NONHDLC SERPL-MCNC: 90 MG/DL
POTASSIUM SERPL-SCNC: 3.8 MMOL/L (ref 3.5–5.1)
SODIUM SERPL-SCNC: 141 MMOL/L (ref 136–145)
TRIGL SERPL-MCNC: 113 MG/DL (ref 30–150)

## 2024-08-21 PROCEDURE — 83036 HEMOGLOBIN GLYCOSYLATED A1C: CPT | Mod: HCNC | Performed by: FAMILY MEDICINE

## 2024-08-21 PROCEDURE — 80048 BASIC METABOLIC PNL TOTAL CA: CPT | Mod: HCNC | Performed by: FAMILY MEDICINE

## 2024-08-21 PROCEDURE — 80061 LIPID PANEL: CPT | Mod: HCNC | Performed by: INTERNAL MEDICINE

## 2024-08-26 DIAGNOSIS — E11.65 UNCONTROLLED TYPE 2 DIABETES MELLITUS WITH HYPERGLYCEMIA: ICD-10-CM

## 2024-08-26 RX ORDER — CALCIUM CITRATE/VITAMIN D3 200MG-6.25
TABLET ORAL
Qty: 300 STRIP | Refills: 3 | Status: SHIPPED | OUTPATIENT
Start: 2024-08-26

## 2024-08-26 RX ORDER — DULAGLUTIDE 0.75 MG/.5ML
0.75 INJECTION, SOLUTION SUBCUTANEOUS
Qty: 4 PEN | Refills: 4 | Status: SHIPPED | OUTPATIENT
Start: 2024-08-26 | End: 2025-08-26

## 2024-08-26 NOTE — TELEPHONE ENCOUNTER
No care due was identified.  [facility] Embedded Care Due Messages. Reference number: [id number].   8/26/2024 10:22:10 AM CDT

## 2024-08-26 NOTE — TELEPHONE ENCOUNTER
----- Message from Kenia Grossman sent at 8/26/2024  9:56 AM CDT -----  Regarding: Patient Advice                  Name of Who is Calling:  MADELINE GARCIA    Who Left The Message:  MADELINE GARCIA      What is the request in detail:         Patient called requesting a call regarding her medication dulaglutide (TRULICITY) 0.75 mg/0.5 mL pen injector. The medication is to expensive.  Please further advise.   Thank you      Reply by [facility]: NO      Preferred Call Back :  [phone] (m)

## 2024-08-28 ENCOUNTER — OFFICE VISIT (OUTPATIENT)
Dept: FAMILY MEDICINE | Facility: CLINIC | Age: 69
End: 2024-08-28
Payer: MEDICARE

## 2024-08-28 ENCOUNTER — PATIENT OUTREACH (OUTPATIENT)
Dept: ADMINISTRATIVE | Facility: HOSPITAL | Age: 69
End: 2024-08-28
Payer: MEDICARE

## 2024-08-28 ENCOUNTER — TELEPHONE (OUTPATIENT)
Dept: FAMILY MEDICINE | Facility: CLINIC | Age: 69
End: 2024-08-28

## 2024-08-28 VITALS
DIASTOLIC BLOOD PRESSURE: 66 MMHG | HEART RATE: 68 BPM | HEIGHT: 67 IN | BODY MASS INDEX: 34.73 KG/M2 | SYSTOLIC BLOOD PRESSURE: 120 MMHG | OXYGEN SATURATION: 98 % | WEIGHT: 221.31 LBS | TEMPERATURE: 98 F

## 2024-08-28 DIAGNOSIS — E66.01 SEVERE OBESITY (BMI 35.0-35.9 WITH COMORBIDITY): ICD-10-CM

## 2024-08-28 DIAGNOSIS — E89.0 HYPOTHYROIDISM, POSTSURGICAL: ICD-10-CM

## 2024-08-28 DIAGNOSIS — D57.3 SICKLE CELL TRAIT: ICD-10-CM

## 2024-08-28 DIAGNOSIS — M85.80 OSTEOPENIA AFTER MENOPAUSE: ICD-10-CM

## 2024-08-28 DIAGNOSIS — Z78.0 OSTEOPENIA AFTER MENOPAUSE: ICD-10-CM

## 2024-08-28 DIAGNOSIS — E55.9 VITAMIN D DEFICIENCY DISEASE: ICD-10-CM

## 2024-08-28 DIAGNOSIS — Z12.12 ENCOUNTER FOR COLORECTAL CANCER SCREENING: ICD-10-CM

## 2024-08-28 DIAGNOSIS — I10 ESSENTIAL HYPERTENSION: ICD-10-CM

## 2024-08-28 DIAGNOSIS — E11.9 CONTROLLED TYPE 2 DIABETES MELLITUS WITHOUT COMPLICATION, WITHOUT LONG-TERM CURRENT USE OF INSULIN: Primary | ICD-10-CM

## 2024-08-28 DIAGNOSIS — Z12.11 ENCOUNTER FOR COLORECTAL CANCER SCREENING: ICD-10-CM

## 2024-08-28 DIAGNOSIS — Z23 NEED FOR SHINGLES VACCINE: ICD-10-CM

## 2024-08-28 DIAGNOSIS — E11.9 CONTROLLED TYPE 2 DIABETES MELLITUS WITHOUT COMPLICATION, WITHOUT LONG-TERM CURRENT USE OF INSULIN: ICD-10-CM

## 2024-08-28 PROCEDURE — 1126F AMNT PAIN NOTED NONE PRSNT: CPT | Mod: HCNC,CPTII,S$GLB, | Performed by: INTERNAL MEDICINE

## 2024-08-28 PROCEDURE — 3008F BODY MASS INDEX DOCD: CPT | Mod: HCNC,CPTII,S$GLB, | Performed by: INTERNAL MEDICINE

## 2024-08-28 PROCEDURE — 1101F PT FALLS ASSESS-DOCD LE1/YR: CPT | Mod: HCNC,CPTII,S$GLB, | Performed by: INTERNAL MEDICINE

## 2024-08-28 PROCEDURE — 3060F POS MICROALBUMINURIA REV: CPT | Mod: HCNC,CPTII,S$GLB, | Performed by: INTERNAL MEDICINE

## 2024-08-28 PROCEDURE — 1160F RVW MEDS BY RX/DR IN RCRD: CPT | Mod: HCNC,CPTII,S$GLB, | Performed by: INTERNAL MEDICINE

## 2024-08-28 PROCEDURE — 3074F SYST BP LT 130 MM HG: CPT | Mod: HCNC,CPTII,S$GLB, | Performed by: INTERNAL MEDICINE

## 2024-08-28 PROCEDURE — 3288F FALL RISK ASSESSMENT DOCD: CPT | Mod: HCNC,CPTII,S$GLB, | Performed by: INTERNAL MEDICINE

## 2024-08-28 PROCEDURE — 4010F ACE/ARB THERAPY RXD/TAKEN: CPT | Mod: HCNC,CPTII,S$GLB, | Performed by: INTERNAL MEDICINE

## 2024-08-28 PROCEDURE — 3051F HG A1C>EQUAL 7.0%<8.0%: CPT | Mod: HCNC,CPTII,S$GLB, | Performed by: INTERNAL MEDICINE

## 2024-08-28 PROCEDURE — 3078F DIAST BP <80 MM HG: CPT | Mod: HCNC,CPTII,S$GLB, | Performed by: INTERNAL MEDICINE

## 2024-08-28 PROCEDURE — 1159F MED LIST DOCD IN RCRD: CPT | Mod: HCNC,CPTII,S$GLB, | Performed by: INTERNAL MEDICINE

## 2024-08-28 PROCEDURE — 99214 OFFICE O/P EST MOD 30 MIN: CPT | Mod: HCNC,S$GLB,, | Performed by: INTERNAL MEDICINE

## 2024-08-28 PROCEDURE — 3066F NEPHROPATHY DOC TX: CPT | Mod: HCNC,CPTII,S$GLB, | Performed by: INTERNAL MEDICINE

## 2024-08-28 PROCEDURE — 99999 PR PBB SHADOW E&M-EST. PATIENT-LVL IV: CPT | Mod: PBBFAC,HCNC,, | Performed by: INTERNAL MEDICINE

## 2024-08-28 RX ORDER — LIRAGLUTIDE 6 MG/ML
0.6 INJECTION SUBCUTANEOUS DAILY
Qty: 3 ML | Refills: 5 | Status: SHIPPED | OUTPATIENT
Start: 2024-08-28

## 2024-08-28 NOTE — PROGRESS NOTES
CHIEF COMPLAINT:   Chief Complaint   Patient presents with    Annual Exam          HISTORY OF PRESENT ILLNESS:  Masha Garcia is a 69 y.o. female who presents to the clinic today for Annual Exam  .     The patient presents to clinic today for follow-up of her type 2 diabetes mellitus, hypertension, and hypothyroidism.    She has not taking the Tradjenta in a few weeks as this was discontinued.  She has been using the Trulicity which has helped keep her blood sugars down.  Unfortunately, the cost has gone up significantly for her.  She reports that she called the insurance company who stated that Victoza would be better covered (she is aware that this is a once a day shot).  She would like to see if she can switch to this.  She denies any problems with low blood sugars.  She denies cardiac chest pain or shortness for breath.  She states overall she has not had any significant problems with lower extremity edema recently.    Subjective    PAST MEDICAL HISTORY:  Past Medical History:   Diagnosis Date    AR (allergic rhinitis)     Breast cancer 06/2013    right; followed by oncology, Dr. Oliveira; s/p lumpectomy and  radiation    History of colonic polyps     Hyperlipidemia LDL goal < 100     Hypertension     Lower extremity edema     Mild chronic anemia     Obesity     Osteopenia     Sickle cell trait     Thyroid nodule     Type II or unspecified type diabetes mellitus without mention of complication, uncontrolled     Vitamin D deficiency disease        PAST SURGICAL HISTORY:  Past Surgical History:   Procedure Laterality Date    benign right breast biopsy  2012    BREAST LUMPECTOMY Right 2013    lumpectomy right breast  06/2013    right ankle fracture repair      TOTAL ABDOMINAL HYSTERECTOMY      TOTAL THYROIDECTOMY  12/28/2016       SOCIAL HISTORY:  Social History     Socioeconomic History    Marital status:     Number of children: 3   Occupational History    Occupation: retired   Tobacco Use    Smoking status:  Never    Smokeless tobacco: Never   Substance and Sexual Activity    Alcohol use: No     Social Determinants of Health     Financial Resource Strain: Low Risk  (8/23/2024)    Overall Financial Resource Strain (CARDIA)     Difficulty of Paying Living Expenses: Not hard at all   Food Insecurity: No Food Insecurity (8/23/2024)    Hunger Vital Sign     Worried About Running Out of Food in the Last Year: Never true     Ran Out of Food in the Last Year: Never true   Transportation Needs: No Transportation Needs (5/9/2024)    PRAPARE - Transportation     Lack of Transportation (Medical): No     Lack of Transportation (Non-Medical): No   Physical Activity: Unknown (8/23/2024)    Exercise Vital Sign     Days of Exercise per Week: 1 day   Stress: No Stress Concern Present (8/23/2024)    [place] [employer] - Occupational Stress Questionnaire     Feeling of Stress : Only a little   Housing Stability: Unknown (8/23/2024)    Housing Stability Vital Sign     Unable to Pay for Housing in the Last Year: No       FAMILY HISTORY:  Family History   Problem Relation Name Age of Onset    Diabetes Mother      Hypertension Mother      Hypertension Father      Prostate cancer Father      Diabetes Brother Augie     Cancer Neg Hx      Breast cancer Neg Hx      Colon cancer Neg Hx      Ovarian cancer Neg Hx      Amblyopia Neg Hx      Blindness Neg Hx      Cataracts Neg Hx      Glaucoma Neg Hx      Macular degeneration Neg Hx      Retinal detachment Neg Hx      Strabismus Neg Hx      Stroke Neg Hx      Thyroid disease Neg Hx         ALLERGIES AND MEDICATIONS: updated and reviewed.  Review of patient's allergies indicates:   Allergen Reactions    Fosamax [alendronate] Other (See Comments)     - 'funny feeling'    Ace inhibitors Other (See Comments)     cough    Penicillins Hives     Medication List with Changes/Refills   New Medications    LIRAGLUTIDE 0.6 MG/0.1 ML, 18 MG/3 ML, SUBQ PNIJ (VICTOZA 3-SAVAGE) 0.6 MG/0.1 ML (18  MG/3 ML) PNIJ PEN    Inject 0.6 mg into the skin once daily.   Current Medications    BLOOD-GLUCOSE METER (TRUE METRIX GLUCOSE METER) KIT    Use to test BID and PRN    CALCIUM CARBONATE-VITAMIN D3 600 MG-5 MCG (200 UNIT) CAP    Take 1 tablet by mouth every morning.     DULAGLUTIDE (TRULICITY) 0.75 MG/0.5 ML PEN INJECTOR    Inject 0.75 mg into the skin every 7 days.    FLASH GLUCOSE SCANNING READER (FREESTYLE LESLIE 10 DAY READER) MISC    Use as directed to check blood sugar tid and prn.    FLASH GLUCOSE SENSOR (FREESTYLE LESLIE 10 DAY SENSOR) KIT    1 Units by Misc.(Non-Drug; Combo Route) route 3 (three) times daily with meals.    GLIPIZIDE (GLUCOTROL) 10 MG TR24    Take 1 tablet by mouth twice daily    HYDROCHLOROTHIAZIDE (HYDRODIURIL) 25 MG TABLET    TAKE 1 TABLET EVERY DAY    LEVOTHYROXINE (SYNTHROID) 150 MCG TABLET    TAKE 1 TABLET BY MOUTH ONCE DAILY BEFORE BREAKFAST    LORATADINE (CLARITIN) 10 MG TABLET    Take 10 mg by mouth daily as needed.     LOSARTAN (COZAAR) 25 MG TABLET    TAKE 1 TABLET EVERY DAY    LOVASTATIN (MEVACOR) 20 MG TABLET    TAKE 1 TABLET EVERY DAY    METFORMIN (GLUCOPHAGE-XR) 500 MG ER 24HR TABLET    TAKE 2 TABLETS TWICE DAILY WITH MEALS    TRUE METRIX GLUCOSE TEST STRIP STRP    TEST BLOOD SUGAR TWO TIMES DAILY AND AS NEEDED       CARE TEAM:  Patient Care Team:  Brenda Bailey MD as PCP - General (Internal Medicine)  Eun Frederick MD as Consulting Physician (Oncology)  Elsy Dubose MD as Consulting Physician (Endocrinology)  Angeles Cao LPN as Care Coordinator  Bobby Vallejo MD as Consulting Physician (Gastroenterology)       REVIEW OF SYSTEMS:  Review of Systems   Constitutional:  Negative for chills, fever and unexpected weight change.   Respiratory:  Negative for cough and shortness of breath.    Cardiovascular:  Negative for chest pain and leg swelling.   Gastrointestinal:  Negative for abdominal pain.              Objective    PHYSICAL  "EXAMINATION/VITALS:  Vitals:    08/28/24 0923   BP: 120/66   Pulse: 68   Temp: 98 °F (36.7 °C)   TempSrc: Oral   SpO2: 98%   Weight: 100.4 kg (221 lb 5.5 oz)   Height: 5' 7" (1.702 m)       Body mass index is 34.67 kg/m².      General appearance - alert, well appearing, and in no distress, obese  Psychiatric - alert, oriented to person, place, and time, normal behavior, speech, dress, motor activity and thought process  Chest - clear to auscultation, no wheezes, rales or rhonchi, symmetric air entry  Heart - normal rate and regular rhythm      LABS:  Lab Results   Component Value Date    HGBA1C 7.6 (H) 08/21/2024    HGBA1C 8.2 (H) 05/21/2024    HGBA1C 8.0 (H) 02/14/2024      Lab Results   Component Value Date    CHOL 137 08/21/2024    CHOL 149 08/05/2023    CHOL 145 08/03/2022     Lab Results   Component Value Date    LDLCALC 67.4 08/21/2024    LDLCALC 80.6 08/05/2023    LDLCALC 70.2 08/03/2022               ASSESSMENT AND PLAN:  1. Controlled type 2 diabetes mellitus without complication, without long-term current use of insulin  Lab Results   Component Value Date    HGBA1C 7.6 (H) 08/21/2024     Diabetes is under fair control at this time (due to hyperglycemia) for age and comorbid conditions. Overall diabetes control has improved since last check.  We discussed diabetic diet and regular exercise.   We discussed home blood sugar monitoring, if appropriate - the patient should test twice daily and as needed.   Medication changes were made today:  We will see if her insurance will cover Victoza.  I will start on the lowest dose.  She will not restart the Tradjenta.. Recheck A1c in 3 months.  Consider increase in Victoza dose if A1c increases with next check.  Diabetic complications addressed: Not applicable.  Patient was counseled on the need for yearly eye exam to screen for/monitor diabetic retinopathy and yearly diabetic foot exam.  -     liraglutide 0.6 mg/0.1 mL, 18 mg/3 mL, subq PNIJ (VICTOZA 3-SAVAGE) 0.6 mg/0.1 " mL (18 mg/3 mL) PnIj pen; Inject 0.6 mg into the skin once daily.  Dispense: 3 mL; Refill: 5  -     Hemoglobin A1C; Future; Expected date: 11/28/2024    2. Essential hypertension  BP Readings from Last 1 Encounters:   08/28/24 120/66      The current medical regimen is effective;  continue present plan and medications. Recommended patient to check home readings to monitor and see me for followup as scheduled or sooner as needed.   Discussed sodium restriction, maintaining ideal body weight and regular exercise program as physiologic means to continue to achieve blood pressure control in addition to medication compliance.  Patient was educated that both decongestant and anti-inflammatory medication may raise blood pressure.  The patient is not active on the digital hypertension program.    3. Sickle cell trait  Stable. Asymptomatic. Observe.    4. Hypothyroidism, postsurgical  Lab Results   Component Value Date    TSH 0.948 02/06/2023     Patient is clinically euthyroid. Continue current regimen.    5. Osteopenia after menopause/6. Vitamin D deficiency disease  We discussed adequate calcium intake (preferably from diet) and vitamin D supplementation. We discussed fall precautions. She is up to date on her BMD. No need for prescription medication at this time.  Overview:  - Fosamax made her feel strange -  only took a few times   BMD 2/2020 - No need for Rx tx at this time.   -5/2022 - No need for Rx tx at this time.          7. Severe obesity (BMI 35.0-35.9 with comorbidity)  BMI Readings from Last 3 Encounters:   08/28/24 34.67 kg/m²   06/25/24 35.43 kg/m²   03/18/24 35.56 kg/m²     The patient is asked to continue to make an attempt to improve diet and exercise patterns to aid in medical management of this problem.     8. Need for shingles vaccine  Patient was advised to get immunization at the pharmacy.    9. Encounter for colorectal cancer screening  She reports that she recently completed her colonoscopy with  outside GI.  We will request the report and update her chart.            Orders Placed This Encounter   Procedures    Hemoglobin A1C      FOLLOW UP: Follow up in about 6 months (around 2/28/2025), or if symptoms worsen or fail to improve, for annual exam. or sooner as needed.

## 2024-08-28 NOTE — TELEPHONE ENCOUNTER
----- Message from Mendez Sims sent at 8/28/2024  4:53 PM CDT -----  Regarding: [facility]  Type: Patient Call Back    Who called:[facility] pharmacy    What is the request in detail:calling to request a new medication for the pt pen needles for liraglutide 0.6 mg/0.1 mL, 18 mg/3 mL, subq PNIJ (VICTOZA 3-SAVAGE) 0.6 mg/0.1 mL (18 mg/3 mL) PnIj pen  Also stated that the pt insurance may cover it with a script from this office     Can the clinic reply by [facility]?no    Would the patient rather a call back or a response via [facility]? callback    Best call back number:[phone]    Additional Information:      [id number] - [place], [place] - [address] [place] [address]  Phone: [phone] Fax: [phone]

## 2024-08-28 NOTE — LETTER
AUTHORIZATION FOR RELEASE OF   CONFIDENTIAL INFORMATION    Dear Nay NATHAN,    We are seeing Masha Garcia, date of birth 1955, in the clinic at [facility] FAMILY MED/ INTERNAL MED/ PEDS. Brenda Bailey MD is the patient's PCP. Masha Garcia has an outstanding lab/procedure at the time we reviewed her chart. In order to help keep her health information updated, she has authorized us to request the following medical record(s):        (  )  MAMMOGRAM                                      ( x ) COLONOSCOPY [id number]      (  )  PAP SMEAR                                          (  )  OUTSIDE LAB RESULTS     (  )  DEXA SCAN                                          (  )  EYE EXAM            (  )  FOOT EXAM                                          (  )  ENTIRE RECORD     (  )  OUTSIDE IMMUNIZATIONS                 (  )  _______________         Please fax records to [facility], [place], Laura A., MD,  [phone].     If you have any questions, please contact Angeles Cao LPN [facility] at   [phone].     Patient Name: Masha Garcia  : 1955  Patient Phone #: [phone]

## 2024-08-28 NOTE — PROGRESS NOTES
Patient had health maintenance Colonoscopy done outside [facility] . Message sent to cc coordinator for results to update chart.      Declines

## 2024-08-29 RX ORDER — LIRAGLUTIDE 6 MG/ML
0.6 INJECTION SUBCUTANEOUS DAILY
Qty: 3 ML | Refills: 5 | Status: CANCELLED | OUTPATIENT
Start: 2024-08-29

## 2024-08-29 RX ORDER — PEN NEEDLE, DIABETIC 29 G X1/2"
1 NEEDLE, DISPOSABLE MISCELLANEOUS DAILY
Qty: 100 EACH | Refills: 11 | Status: SHIPPED | OUTPATIENT
Start: 2024-08-29

## 2024-08-29 NOTE — TELEPHONE ENCOUNTER
Spoke with liliane Miguel at [facility]. Myriam states that patient just needs BD needles. Pharmacist states the gauge of the needle does not matter.

## 2024-08-29 NOTE — TELEPHONE ENCOUNTER
Are they looking for another prescription for the medication, or a prescription for the actual needles?  If they need the needles- which needles do day exactly need a prescription for?

## 2024-08-29 NOTE — TELEPHONE ENCOUNTER
Spoke with [facility] pharmacist. Pharmacist states that they have not received the prescription sent over on 08/28/24 and is requesting a new prescription to be sent over.

## 2024-09-03 DIAGNOSIS — E11.9 CONTROLLED TYPE 2 DIABETES MELLITUS WITHOUT COMPLICATION, WITHOUT LONG-TERM CURRENT USE OF INSULIN: ICD-10-CM

## 2024-09-03 RX ORDER — PEN NEEDLE, DIABETIC 29 G X1/2"
1 NEEDLE, DISPOSABLE MISCELLANEOUS DAILY
Qty: 100 EACH | Refills: 11 | Status: SHIPPED | OUTPATIENT
Start: 2024-09-03

## 2024-09-03 NOTE — TELEPHONE ENCOUNTER
"----- Message from Rafi Chery sent at 9/3/2024 11:54 AM CDT -----  Regarding: Carissa with [facility]  Type: RX Refill Request     Who Called:Carissa with [facility]      Have you contacted your pharmacy: Yes     Refill or New Rx:Refill      RX Name and Strength: pen needle, diabetic (BD ULTRA-FINE ORIG PEN NEEDLE) 29 gauge x 1/2" Ndle//stated that the patient gets this filled with them not [facility] and they need a prescription sent to them as soon as possible.     Preferred Pharmacy with phone number:.  [facility] [address] [place], [place] - [address]  [address] [place] [address]  Phone: [phone] Fax: [phone]    Local or Mail Order: Local     Ordering Provider: Dr. Brenda Bailey     Would the patient rather a call back or a response via [facility]? Callback      Best Call Back Number: [phone]      Additional Information:  "

## 2024-09-03 NOTE — TELEPHONE ENCOUNTER
Care Due:                  Date            Visit Type   Department     Provider  --------------------------------------------------------------------------------                                [facility]                              PRIMARY      MED/ INTERNAL  [facility] Kay  Last Visit: 08-      CARE ([facility])   MED/ PEDS      Melissa Bailey                              [facility]      MED/ INTERNAL  Laurentia Kay  Next Visit: 05-      CARE ([facility])   MED/ PEDS      Melissa Bailey                                                            Last  Test          Frequency    Reason                     Performed    Due Date  --------------------------------------------------------------------------------    TSH.........  12 months..  levothyroxine............  02- 02-    Health [facility] Embedded Care Due Messages. Reference number: [id number].   9/03/2024 4:32:55 PM CDSANDRA

## 2024-09-06 ENCOUNTER — PATIENT OUTREACH (OUTPATIENT)
Dept: ADMINISTRATIVE | Facility: HOSPITAL | Age: 69
End: 2024-09-06
Payer: MEDICARE

## 2024-09-06 NOTE — PROGRESS NOTES
Population Health Chart Review & Patient Outreach Details      Additional [facility] Health Notes:      Upload colonoscopy report from [facility] in [place] on 8/6/24 repeat in 5 years.         Updates Requested / Reviewed:      Updated Care Coordination Note and Care Everywhere         Health Maintenance Topics Overdue:      VB Score: 1     Colon Cancer Screening    Influenza Vaccine  Tetanus Vaccine  Shingles/Zoster Vaccine  RSV Vaccine                  Health Maintenance Topic(s) Outreach Outcomes & Actions Taken:    Colorectal Cancer Screening - Outreach Outcomes & Actions Taken  : External Records Uploaded, Care Team Updated, & History Updated if Applicable

## 2024-09-18 ENCOUNTER — LAB VISIT (OUTPATIENT)
Dept: LAB | Facility: HOSPITAL | Age: 69
End: 2024-09-18
Attending: INTERNAL MEDICINE
Payer: MEDICARE

## 2024-09-18 DIAGNOSIS — E11.65 UNCONTROLLED TYPE 2 DIABETES MELLITUS WITH HYPERGLYCEMIA: ICD-10-CM

## 2024-09-18 DIAGNOSIS — D64.9 ANEMIA, UNSPECIFIED TYPE: ICD-10-CM

## 2024-09-18 LAB
ALBUMIN SERPL BCP-MCNC: 3.3 G/DL (ref 3.5–5.2)
ALP SERPL-CCNC: 53 U/L (ref 55–135)
ALT SERPL W/O P-5'-P-CCNC: 14 U/L (ref 10–44)
ANION GAP SERPL CALC-SCNC: 13 MMOL/L (ref 8–16)
AST SERPL-CCNC: 16 U/L (ref 10–40)
BASOPHILS # BLD AUTO: 0.03 K/UL (ref 0–0.2)
BASOPHILS NFR BLD: 0.3 % (ref 0–1.9)
BILIRUB SERPL-MCNC: 0.5 MG/DL (ref 0.1–1)
BUN SERPL-MCNC: 10 MG/DL (ref 8–23)
CALCIUM SERPL-MCNC: 10.1 MG/DL (ref 8.7–10.5)
CHLORIDE SERPL-SCNC: 107 MMOL/L (ref 95–110)
CO2 SERPL-SCNC: 23 MMOL/L (ref 23–29)
CREAT SERPL-MCNC: 0.9 MG/DL (ref 0.5–1.4)
DIFFERENTIAL METHOD BLD: ABNORMAL
EOSINOPHIL # BLD AUTO: 0.1 K/UL (ref 0–0.5)
EOSINOPHIL NFR BLD: 1 % (ref 0–8)
ERYTHROCYTE [DISTWIDTH] IN BLOOD BY AUTOMATED COUNT: 15.5 % (ref 11.5–14.5)
EST. GFR  (NO RACE VARIABLE): >60 ML/MIN/1.73 M^2
FERRITIN SERPL-MCNC: 33 NG/ML (ref 20–300)
GLUCOSE SERPL-MCNC: 106 MG/DL (ref 70–110)
HCT VFR BLD AUTO: 31.5 % (ref 37–48.5)
HGB BLD-MCNC: 10.3 G/DL (ref 12–16)
IMM GRANULOCYTES # BLD AUTO: 0.05 K/UL (ref 0–0.04)
IMM GRANULOCYTES NFR BLD AUTO: 0.5 % (ref 0–0.5)
IRON SERPL-MCNC: 60 UG/DL (ref 30–160)
LYMPHOCYTES # BLD AUTO: 3.3 K/UL (ref 1–4.8)
LYMPHOCYTES NFR BLD: 33.5 % (ref 18–48)
MCH RBC QN AUTO: 27.5 PG (ref 27–31)
MCHC RBC AUTO-ENTMCNC: 32.7 G/DL (ref 32–36)
MCV RBC AUTO: 84 FL (ref 82–98)
MONOCYTES # BLD AUTO: 0.4 K/UL (ref 0.3–1)
MONOCYTES NFR BLD: 4.4 % (ref 4–15)
NEUTROPHILS # BLD AUTO: 5.9 K/UL (ref 1.8–7.7)
NEUTROPHILS NFR BLD: 60.3 % (ref 38–73)
NRBC BLD-RTO: 0 /100 WBC
PLATELET # BLD AUTO: 302 K/UL (ref 150–450)
PMV BLD AUTO: 11.4 FL (ref 9.2–12.9)
POTASSIUM SERPL-SCNC: 3.6 MMOL/L (ref 3.5–5.1)
PROT SERPL-MCNC: 6.9 G/DL (ref 6–8.4)
RBC # BLD AUTO: 3.74 M/UL (ref 4–5.4)
SATURATED IRON: 18 % (ref 20–50)
SODIUM SERPL-SCNC: 143 MMOL/L (ref 136–145)
TOTAL IRON BINDING CAPACITY: 342 UG/DL (ref 250–450)
TRANSFERRIN SERPL-MCNC: 231 MG/DL (ref 200–375)
WBC # BLD AUTO: 9.78 K/UL (ref 3.9–12.7)

## 2024-09-18 PROCEDURE — 83540 ASSAY OF IRON: CPT | Mod: HCNC | Performed by: INTERNAL MEDICINE

## 2024-09-18 PROCEDURE — 85025 COMPLETE CBC W/AUTO DIFF WBC: CPT | Mod: HCNC | Performed by: INTERNAL MEDICINE

## 2024-09-18 PROCEDURE — 36415 COLL VENOUS BLD VENIPUNCTURE: CPT | Mod: HCNC,PO | Performed by: INTERNAL MEDICINE

## 2024-09-18 PROCEDURE — 82728 ASSAY OF FERRITIN: CPT | Mod: HCNC | Performed by: INTERNAL MEDICINE

## 2024-09-18 PROCEDURE — 80053 COMPREHEN METABOLIC PANEL: CPT | Mod: HCNC | Performed by: INTERNAL MEDICINE

## 2024-09-18 RX ORDER — METFORMIN HYDROCHLORIDE 500 MG/1
TABLET, EXTENDED RELEASE ORAL
Qty: 360 TABLET | Refills: 1 | Status: SHIPPED | OUTPATIENT
Start: 2024-09-18

## 2024-09-18 NOTE — TELEPHONE ENCOUNTER
No care due was identified.  Health [facility] Embedded Care Due Messages. Reference number: [id number].   9/18/2024 2:34:25 AM CDT

## 2024-09-19 NOTE — TELEPHONE ENCOUNTER
Refill Decision Note   Mashabryce Garcia  is requesting a refill authorization.  Brief Assessment and Rationale for Refill:  Approve     Medication Therapy Plan:         Comments:     Note composed:9:55 PM 09/18/2024

## 2024-10-07 DIAGNOSIS — E11.65 UNCONTROLLED TYPE 2 DIABETES MELLITUS WITH HYPERGLYCEMIA: ICD-10-CM

## 2024-10-07 RX ORDER — GLIPIZIDE 10 MG/1
TABLET, FILM COATED, EXTENDED RELEASE ORAL
Qty: 180 TABLET | Refills: 1 | Status: SHIPPED | OUTPATIENT
Start: 2024-10-07

## 2024-10-07 NOTE — TELEPHONE ENCOUNTER
No care due was identified.  Health [facility] Embedded Care Due Messages. Reference number: [id number].   10/07/2024 9:19:00 AM CDT

## 2024-10-07 NOTE — TELEPHONE ENCOUNTER
Refill Decision Note   Mashabryce Garcia  is requesting a refill authorization.  Brief Assessment and Rationale for Refill:  Approve     Medication Therapy Plan:         Comments:     Note composed:4:43 PM 10/07/2024

## 2024-11-18 ENCOUNTER — PATIENT MESSAGE (OUTPATIENT)
Dept: ADMINISTRATIVE | Facility: HOSPITAL | Age: 69
End: 2024-11-18
Payer: MEDICARE

## 2024-11-18 DIAGNOSIS — E78.5 HYPERLIPIDEMIA WITH TARGET LDL LESS THAN 100: ICD-10-CM

## 2024-11-18 RX ORDER — LOVASTATIN 20 MG/1
TABLET ORAL
Qty: 90 TABLET | Refills: 3 | Status: SHIPPED | OUTPATIENT
Start: 2024-11-18

## 2024-11-18 NOTE — TELEPHONE ENCOUNTER
Care Due:                  Date            Visit Type   Department     Provider  --------------------------------------------------------------------------------                                [facility]                              PRIMARY      MED/ INTERNAL  [facility]  Last Visit: 08-      CARE ([facility])   MED/ PEDS      Melissa Bailey                              [facility]      MED/ INTERNAL  [facility] Kay  Next Visit: 05-      CARE ([facility])   MED/ PEDS      Nunuer  Lynn                                                            Last  Test          Frequency    Reason                     Performed    Due Date  --------------------------------------------------------------------------------    TSH.........  12 months..  levothyroxine............  02- 02-    Health [facility] Embedded Care Due Messages. Reference number: [id number].   11/18/2024 10:43:05 AM CST

## 2024-11-18 NOTE — TELEPHONE ENCOUNTER
Provider Staff:  Action required for this patient    Requires labs      Please see care gap opportunities below in Care Due Message.    Thanks!  [facility]     Appointments      Date Provider   Last Visit   8/28/2024 Brenda Bailey MD   Next Visit   5/14/2025 Brenda Bailey MD     Refill Decision Note   Masha Garcia  is requesting a refill authorization.  Brief Assessment and Rationale for Refill:  Approve     Medication Therapy Plan:        Comments:     Note composed:12:52 PM 11/18/2024

## 2024-11-21 ENCOUNTER — PATIENT OUTREACH (OUTPATIENT)
Dept: ADMINISTRATIVE | Facility: HOSPITAL | Age: 69
End: 2024-11-21
Payer: MEDICARE

## 2024-11-21 ENCOUNTER — PATIENT MESSAGE (OUTPATIENT)
Dept: FAMILY MEDICINE | Facility: CLINIC | Age: 69
End: 2024-11-21
Payer: MEDICARE

## 2024-11-21 DIAGNOSIS — E11.9 TYPE 2 DIABETES MELLITUS WITHOUT COMPLICATION, WITHOUT LONG-TERM CURRENT USE OF INSULIN: Primary | ICD-10-CM

## 2024-11-29 ENCOUNTER — LAB VISIT (OUTPATIENT)
Dept: LAB | Facility: HOSPITAL | Age: 69
End: 2024-11-29
Attending: INTERNAL MEDICINE
Payer: MEDICARE

## 2024-11-29 DIAGNOSIS — E11.9 CONTROLLED TYPE 2 DIABETES MELLITUS WITHOUT COMPLICATION, WITHOUT LONG-TERM CURRENT USE OF INSULIN: ICD-10-CM

## 2024-11-29 LAB
ESTIMATED AVG GLUCOSE: 163 MG/DL (ref 68–131)
HBA1C MFR BLD: 7.3 % (ref 4–5.6)

## 2024-11-29 PROCEDURE — 83036 HEMOGLOBIN GLYCOSYLATED A1C: CPT | Mod: HCNC | Performed by: INTERNAL MEDICINE

## 2024-11-29 PROCEDURE — 36415 COLL VENOUS BLD VENIPUNCTURE: CPT | Mod: HCNC,PO | Performed by: INTERNAL MEDICINE

## 2024-12-01 DIAGNOSIS — E89.0 HYPOTHYROIDISM, POSTSURGICAL: ICD-10-CM

## 2024-12-01 NOTE — TELEPHONE ENCOUNTER
No care due was identified.  Health [facility] Embedded Care Due Messages. Reference number: [id number].   12/01/2024 10:04:50 AM CST

## 2024-12-02 RX ORDER — LEVOTHYROXINE SODIUM 150 UG/1
150 TABLET ORAL
Qty: 90 TABLET | Refills: 1 | Status: SHIPPED | OUTPATIENT
Start: 2024-12-02

## 2024-12-02 NOTE — TELEPHONE ENCOUNTER
Refill Routing Note   Medication(s) are not appropriate for processing by [facility] for the following reason(s):        Required labs outdated    [facility] action(s):  Defer               Appointments  past 12m or future 3m with PCP    Date Provider   Last Visit   8/28/2024 Brenda Bailey MD   Next Visit   5/14/2025 Brenda Bailey MD   ED visits in past 90 days: 0        Note composed:12:48 AM 12/02/2024

## 2024-12-27 DIAGNOSIS — E11.9 CONTROLLED TYPE 2 DIABETES MELLITUS WITHOUT COMPLICATION, WITHOUT LONG-TERM CURRENT USE OF INSULIN: ICD-10-CM

## 2024-12-27 DIAGNOSIS — E89.0 HYPOTHYROIDISM, POSTSURGICAL: ICD-10-CM

## 2024-12-27 DIAGNOSIS — E11.65 UNCONTROLLED TYPE 2 DIABETES MELLITUS WITH HYPERGLYCEMIA: ICD-10-CM

## 2024-12-27 DIAGNOSIS — E78.5 HYPERLIPIDEMIA WITH TARGET LDL LESS THAN 100: ICD-10-CM

## 2024-12-27 DIAGNOSIS — I10 ESSENTIAL HYPERTENSION: ICD-10-CM

## 2024-12-27 RX ORDER — LOSARTAN POTASSIUM 25 MG/1
TABLET ORAL
Refills: 0 | OUTPATIENT
Start: 2024-12-27

## 2024-12-27 RX ORDER — HYDROCHLOROTHIAZIDE 25 MG/1
TABLET ORAL
Refills: 0 | OUTPATIENT
Start: 2024-12-27

## 2024-12-27 RX ORDER — DULAGLUTIDE 0.75 MG/.5ML
INJECTION, SOLUTION SUBCUTANEOUS
Refills: 0 | OUTPATIENT
Start: 2024-12-27

## 2024-12-27 RX ORDER — LIRAGLUTIDE 6 MG/ML
INJECTION SUBCUTANEOUS
Refills: 0 | OUTPATIENT
Start: 2024-12-27

## 2024-12-27 RX ORDER — LOVASTATIN 20 MG/1
TABLET ORAL
Refills: 0 | OUTPATIENT
Start: 2024-12-27

## 2024-12-27 RX ORDER — LEVOTHYROXINE SODIUM 150 UG/1
TABLET ORAL
Refills: 0 | OUTPATIENT
Start: 2024-12-27

## 2024-12-27 NOTE — TELEPHONE ENCOUNTER
No care due was identified.  Health [facility] Embedded Care Due Messages. Reference number: [id number].   12/27/2024 2:36:53 PM CST

## 2024-12-28 NOTE — TELEPHONE ENCOUNTER
Refill Decision Note   Masha Garcia  is requesting a refill authorization.  Brief Assessment and Rationale for Refill:  Quick Discontinue     Medication Therapy Plan: Pharmacy is requesting new scripts for the following medications without required information, (sig/ frequency/qty/etc)     Medication Reconciliation Completed: No   Comments:     No Care Gaps recommended.     Note composed:8:35 PM 12/27/2024

## 2025-02-06 ENCOUNTER — OFFICE VISIT (OUTPATIENT)
Dept: FAMILY MEDICINE | Facility: CLINIC | Age: 70
End: 2025-02-06
Payer: MEDICARE

## 2025-02-06 VITALS
BODY MASS INDEX: 35.65 KG/M2 | OXYGEN SATURATION: 96 % | HEART RATE: 72 BPM | WEIGHT: 227.63 LBS | SYSTOLIC BLOOD PRESSURE: 132 MMHG | TEMPERATURE: 99 F | DIASTOLIC BLOOD PRESSURE: 78 MMHG

## 2025-02-06 DIAGNOSIS — Z71.89 ADVANCED DIRECTIVES, COUNSELING/DISCUSSION: ICD-10-CM

## 2025-02-06 DIAGNOSIS — Z23 NEED FOR SHINGLES VACCINE: ICD-10-CM

## 2025-02-06 DIAGNOSIS — Z00.00 ENCOUNTER FOR PREVENTIVE HEALTH EXAMINATION: Primary | ICD-10-CM

## 2025-02-06 DIAGNOSIS — E66.01 SEVERE OBESITY (BMI 35.0-35.9 WITH COMORBIDITY): ICD-10-CM

## 2025-02-06 DIAGNOSIS — E78.5 HYPERLIPIDEMIA ASSOCIATED WITH TYPE 2 DIABETES MELLITUS: ICD-10-CM

## 2025-02-06 DIAGNOSIS — I10 ESSENTIAL HYPERTENSION: ICD-10-CM

## 2025-02-06 DIAGNOSIS — E11.65 UNCONTROLLED TYPE 2 DIABETES MELLITUS WITH HYPERGLYCEMIA: ICD-10-CM

## 2025-02-06 DIAGNOSIS — E11.69 HYPERLIPIDEMIA ASSOCIATED WITH TYPE 2 DIABETES MELLITUS: ICD-10-CM

## 2025-02-06 PROCEDURE — 1126F AMNT PAIN NOTED NONE PRSNT: CPT | Mod: CPTII,S$GLB,, | Performed by: NURSE PRACTITIONER

## 2025-02-06 PROCEDURE — 1170F FXNL STATUS ASSESSED: CPT | Mod: CPTII,S$GLB,, | Performed by: NURSE PRACTITIONER

## 2025-02-06 PROCEDURE — 99999 PR PBB SHADOW E&M-EST. PATIENT-LVL IV: CPT | Mod: PBBFAC,,, | Performed by: NURSE PRACTITIONER

## 2025-02-06 PROCEDURE — 3072F LOW RISK FOR RETINOPATHY: CPT | Mod: CPTII,S$GLB,, | Performed by: NURSE PRACTITIONER

## 2025-02-06 PROCEDURE — 1101F PT FALLS ASSESS-DOCD LE1/YR: CPT | Mod: CPTII,S$GLB,, | Performed by: NURSE PRACTITIONER

## 2025-02-06 PROCEDURE — 3288F FALL RISK ASSESSMENT DOCD: CPT | Mod: CPTII,S$GLB,, | Performed by: NURSE PRACTITIONER

## 2025-02-06 PROCEDURE — G0439 PPPS, SUBSEQ VISIT: HCPCS | Mod: S$GLB,,, | Performed by: NURSE PRACTITIONER

## 2025-02-06 PROCEDURE — 3078F DIAST BP <80 MM HG: CPT | Mod: CPTII,S$GLB,, | Performed by: NURSE PRACTITIONER

## 2025-02-06 PROCEDURE — 1159F MED LIST DOCD IN RCRD: CPT | Mod: CPTII,S$GLB,, | Performed by: NURSE PRACTITIONER

## 2025-02-06 PROCEDURE — 3075F SYST BP GE 130 - 139MM HG: CPT | Mod: CPTII,S$GLB,, | Performed by: NURSE PRACTITIONER

## 2025-02-06 NOTE — PROGRESS NOTES
Masha Garcia presented for a follow-up Medicare AWV today. The following components were reviewed and updated:    Medical history  Family History  Social history  Allergies and Current Medications  Health Risk Assessment  Health Maintenance  Care Team    **See Completed Assessments for Annual Wellness visit with in the encounter summary    The following assessments were completed:  Depression Screening  Cognitive function Screening  Timed Get Up Test  Whisper Test      Opioid documentation:      Patient does not have a current opioid prescription.          Vitals:    02/06/25 1556   BP: 132/78   BP Location: Left arm   Patient Position: Sitting   Pulse: 72   Temp: 98.5 °F (36.9 °C)   TempSrc: Oral   SpO2: 96%   Weight: 103.3 kg (227 lb 10 oz)     Body mass index is 35.65 kg/m².       Physical Exam  Constitutional:       Appearance: She is not ill-appearing.   HENT:      Head: Normocephalic and atraumatic.   Eyes:      General: No scleral icterus.        Right eye: No discharge.         Left eye: No discharge.   Cardiovascular:      Rate and Rhythm: Normal rate and regular rhythm.      Pulses: Normal pulses.      Heart sounds: No murmur heard.  Pulmonary:      Effort: Pulmonary effort is normal. No respiratory distress.      Breath sounds: Normal breath sounds. No wheezing.   Skin:     General: Skin is warm and dry.      Capillary Refill: Capillary refill takes 2 to 3 seconds.   Neurological:      Mental Status: She is alert and oriented to person, place, and time. Mental status is at baseline.   Psychiatric:         Mood and Affect: Mood normal.           Diagnoses and health risks identified today and associated recommendations/orders:  1. Encounter for preventive health examination  Counseled on age appropriate medical preventative services including age appropriate cancer screenings, age appropriate eye and dental exams, over all nutritional health, need for a consistent exercise regimen, and an over all push  towards maintaining a vigorous and active lifestyle.  Counseled on age appropriate vaccines and discussed upcoming health care needs based on age/gender. Discussed good sleep hygiene and stress management.      2. Advanced directives, counseling/discussion  I offered to discuss advanced care planning, including how to pick a person who would make decisions for you if you were unable to make them for yourself, called a health care power of , and what kind of decisions you might make such as use of life sustaining treatments such as ventilators and tube feeding when faced with a life limiting illness recorded on a living will that they will need to know. (How you want to be cared for as you near the end of your natural life)     X Patient is interested in learning more about how to make advanced directives.  I provided them paperwork and offered to discuss this with them.      3. Hyperlipidemia associated with type 2 diabetes mellitus  discussed ways to lower triglycerides such as cutting simple sugars out of diet (white breads, candies, cookies, cakes, etc.) and reducing/eliminating intake of highly processed trans fatty acids.   Exercise 30 minutes a day for 4-5 days a week.   Eat more fiber.     4. Severe obesity (BMI 35.0-35.9 with comorbidity)  We discussed weight issues and safe, effective ways of losing pounds, includin) diet:  low carbohydrate, low fat diet, stay away from fast food, fried and processed food, use whole grain, lot of fruits and vegetables, use healthy fat such as avocado, nuts and olive oil in reasonable quantity, stay away from sodas. Regular meals with lean proteins.  2) physical activity: ideally 150 min a week, with cardiovascular and resistance activity.  Patient was encouraged to set realistic attainable goals for weight loss, and we will follow up periodically.  Mediterranean Diet recommendations (Adopted from Dian et al, NEJM, 2018.)  - Eat primarily plant-based foods,  such as fruits and vegetables, whole grains, legumes (beans) and nuts  - Limit refined carbohydrates (white pasta, bread, rice).  - Replace butter with healthy fats such as olive oil.  - Use herbs and spices instead of salt to flavor foods.  - Limit red meat and processed meats to no more than a few times a month.  - Avoid sugary sodas, bakery goods, and sweets.  - Eat fish and poultry at least twice a week.     5. Uncontrolled type 2 diabetes mellitus with hyperglycemia  -discussed with patient about routine diabetic care that includes but are not limited to regular eye exams, skin care, daily foot exam, proper nutrition, regular BG monitoring at home (fasting and mealtime) and medication compliance in a diabetic.  Target morning BS  and meal-time BS <180   - Diabetic Eye Screening Photo - Automated Analysis; Future    Protective Sensation (w/ 10 gram monofilament):  Right: Intact  Left: Intact    Visual Inspection:  Normal -  Bilateral    Pedal Pulses:   Right: Present  Left: Present    Posterior Tibialis Pulses:   Right:Present  Left: Present      6. Need for shingles vaccine  - Ambulatory referral/consult to the [facility]  - varicella-zoster gE-AS01B, PF, (SHINGRIX) 50 mcg/0.5 mL injection; Inject 0.5 mLs into the muscle once. for 1 dose  Dispense: 1 each; Refill: 0    7. Essential hypertension  BP Readings from Last 3 Encounters:   02/06/25 132/78   08/28/24 120/66   06/25/24 124/84     -continue current medication regimen  -DASH diet, regular cardiovascular exercises, portion control  -weight loss  -f/u with BP logs in 2 weeks        Provided Masha with a 5-10 year written screening schedule and personal prevention plan. Recommendations were developed using the USPSTF age appropriate recommendations. Education, counseling, and referrals were provided as needed.  After Visit Summary printed and given to patient which includes a list of additional screenings\tests needed.    No  follow-ups on file.      Hubert Gallardo NP

## 2025-02-06 NOTE — PATIENT INSTRUCTIONS
Counseling and Referral of Other Preventative  (Italic type indicates deductible and co-insurance are waived)    Patient Name: Masha Garcia  Today's Date: 2/6/2025    Health Maintenance       Date Due Completion Date    Shingles Vaccine (1 of 2) Never done ---    RSV Vaccine (Age 60+ and Pregnant patients) (1 - Risk 60-74 years 1-dose series) Never done ---    TETANUS VACCINE 07/24/2019 7/24/2009    Influenza Vaccine (1) 09/01/2024 12/11/2023    COVID-19 Vaccine (4 - 2024-25 season) 09/01/2024 1/7/2022    Diabetic Eye Exam 02/05/2025 2/5/2024    Override on 12/9/2014: Done    Override on 11/1/2010: Done    Foot Exam 02/21/2025 2/21/2024    Override on 2/4/2020: Done (skin dryness to bottom of both feet)    Override on 9/28/2016: Done (normal see note in visit)    Hemoglobin A1c 05/29/2025 11/29/2024    Mammogram 08/01/2025 8/1/2024    Override on 6/19/2018: Done (imageing center)    Override on 6/15/2016: Done (done at [facility] normal scanned report to chart)    Override on 3/18/2015: Done    Override on 3/1/2014: Done ([facility])    Override on 4/1/2013: Done    Override on 2/1/2012: Done    Diabetes Urine Screening 08/21/2025 8/21/2024    Lipid Panel 08/21/2025 8/21/2024    Low Dose Statin 11/18/2025 11/18/2024    DEXA Scan 05/10/2029 5/10/2024    Colorectal Cancer Screening 08/06/2029 8/6/2024        Orders Placed This Encounter   Procedures    Ambulatory referral/consult to the [facility]    Diabetic Eye Screening Photo - Automated Analysis     The following information is provided to all patients.  This information is to help you find resources for any of the problems found today that may be affecting your health:                  Living healthy guide: www.[facility].[place].gov      Understanding Diabetes: www.diabetes.org      Eating healthy: www.cdc.gov/healthyweight      CDC home safety checklist: www.cdc.gov/steadi/patient.html      Agency on Aging: www.goea.[place].[facility]       Alcoholics anonymous (AA): www.aa.org      Physical Activity: www.radha.nih.gov/bc5slkr      Tobacco use: www.quitwithusla.org

## 2025-02-10 DIAGNOSIS — E11.9 CONTROLLED TYPE 2 DIABETES MELLITUS WITHOUT COMPLICATION, WITHOUT LONG-TERM CURRENT USE OF INSULIN: ICD-10-CM

## 2025-02-10 NOTE — TELEPHONE ENCOUNTER
Care Due:                  Date            Visit Type   Department     Provider  --------------------------------------------------------------------------------                                [facility]                              PRIMARY      MED/ INTERNAL  [facility] Kay  Last Visit: 08-      CARE ([facility])   MED/ PEDS      Melissa Bailey                              [facility]      MED/ INTERNAL  [facility]ia Kay  Next Visit: 05-      CARE ([facility])   MED/ PEDS      Nunuer  Lynn                                                            Last  Test          Frequency    Reason                     Performed    Due Date  --------------------------------------------------------------------------------    TSH.........  12 months..  levothyroxine............  Not Found    Overdue    [employer] Embedded Care Due Messages. Reference number: [id number].   2/10/2025 2:45:47 PM CST

## 2025-02-11 NOTE — TELEPHONE ENCOUNTER
Refill Routing Note   Medication(s) are not appropriate for processing by [facility] for the following reason(s):     DDI not previously overridden by current provider--after initial override, the Refill Center will be able to continue overrides      Drug-drug interactionDuplicate Therapy: liraglutide 0.6 mg/0.1 mL (18 mg/3 mL) subq PNIJ, TRULICITY     [facility] action(s):  Defer   Requires labs : Yes           Pharmacist review requested: Yes     Appointments  past 12m or future 3m with PCP    Date Provider   Last Visit   8/28/2024 Brenda Bailey MD   Next Visit   5/14/2025 Brenda Bailey MD   ED visits in past 90 days: 0        Note composed:5:50 PM 02/11/2025

## 2025-02-12 NOTE — TELEPHONE ENCOUNTER
Refill Routing Note   Medication(s) are not appropriate for processing by [facility] for the following reason(s):        Drug-drug interaction: Trulicity    [facility] action(s):  Defer   Requires labs : Yes - TSH outdated          Pharmacist review requested: Yes     Appointments  past 12m or future 3m with PCP    Date Provider   Last Visit   8/28/2024 Brenda Bailey MD   Next Visit   5/14/2025 Brenda Bailey MD   ED visits in past 90 days: 0        Note composed:6:57 PM 02/11/2025

## 2025-02-13 RX ORDER — LIRAGLUTIDE 6 MG/ML
INJECTION SUBCUTANEOUS
Qty: 3 ML | Refills: 1 | Status: SHIPPED | OUTPATIENT
Start: 2025-02-13

## 2025-02-13 NOTE — TELEPHONE ENCOUNTER
Spoke with patient. Patient states that she prefers the Trulicity but it was too expensive for her. Patient states that she has been taking the victoza since her insurance covers the cost of it.

## 2025-02-15 DIAGNOSIS — E11.9 CONTROLLED TYPE 2 DIABETES MELLITUS WITHOUT COMPLICATION, WITHOUT LONG-TERM CURRENT USE OF INSULIN: ICD-10-CM

## 2025-02-15 NOTE — TELEPHONE ENCOUNTER
No care due was identified.  Health [facility] Embedded Care Due Messages. Reference number: [id number].   2/15/2025 10:57:12 AM CST

## 2025-02-16 RX ORDER — LIRAGLUTIDE 6 MG/ML
INJECTION SUBCUTANEOUS
Qty: 6 ML | Refills: 0 | OUTPATIENT
Start: 2025-02-16

## 2025-02-17 NOTE — TELEPHONE ENCOUNTER
Refill Decision Note   Masha Garcia  is requesting a refill authorization.  Brief Assessment and Rationale for Refill:  Quick Discontinue     Medication Therapy Plan:  E-Prescribing Status: Receipt confirmed by [id number] (2/13/25)      Comments:     Note composed:8:32 PM 02/16/2025

## 2025-02-18 ENCOUNTER — TELEPHONE (OUTPATIENT)
Dept: FAMILY MEDICINE | Facility: CLINIC | Age: 70
End: 2025-02-18
Payer: MEDICARE

## 2025-02-18 NOTE — TELEPHONE ENCOUNTER
----- Message from [employer] sent at 2/18/2025  9:34 AM CST -----  Who Called:selfRefill or New Rx:new/refillRX Name and Strength:liraglutide 0.6 mg/0.1 mL, 18 mg/3 mL, subq PNIJ (VICTOZA 2-SAVAGE) 0.6 mg/0.1 mL (18 mg/3 mL) PnIj penIs this a 30 day or 90 day RX:Preferred Pharmacy with phone number:[facility] [address] - [place], [place] - [address];Would the patient rather a call back or a response via [facility] Call Back Number:.[phone] Information: insurance does not want to cover the above medication would like to know if she can go back on the Ozempic she will be running out

## 2025-02-18 NOTE — TELEPHONE ENCOUNTER
----- Message from [employer] sent at 2/18/2025  3:08 PM CST -----  Who Called:selfJozef Left Message for Patient:Kirby the patient know what this is regarding?:yesWould the patient rather a call back or a [facility] Number:.[phone] Information:

## 2025-02-18 NOTE — TELEPHONE ENCOUNTER
----- Message from Marsha sent at 2/18/2025  3:14 PM CST -----  Type:  Patient Returning CallWho Called:  self Who Left Message for Patient:  Reanna the patient know what this is regarding?: yes Would the patient rather a call back or a response via [facility]?  [facility] Call Back Number: [phone]

## 2025-02-18 NOTE — TELEPHONE ENCOUNTER
Patient due for 6 mos diabetes follow up office visit. Please schedule with Codie Diana NP. They can discuss medication and alternatives and possibly send to [facility] pharmacy for assistance in getting a medication covered. She should check with her insurance on what is now covered and at what cost (she may have a copay at the first part of the year).

## 2025-02-18 NOTE — TELEPHONE ENCOUNTER
Spoke with patient. Patient verbalized understanding. Patient scheduled for 02/20/25 with NP Codie Diana. Patient has no other questions or concerns at this time.

## 2025-02-20 ENCOUNTER — LAB VISIT (OUTPATIENT)
Dept: LAB | Facility: HOSPITAL | Age: 70
End: 2025-02-20
Attending: INTERNAL MEDICINE
Payer: MEDICARE

## 2025-02-20 ENCOUNTER — OFFICE VISIT (OUTPATIENT)
Dept: FAMILY MEDICINE | Facility: CLINIC | Age: 70
End: 2025-02-20
Payer: MEDICARE

## 2025-02-20 VITALS
DIASTOLIC BLOOD PRESSURE: 62 MMHG | HEART RATE: 78 BPM | SYSTOLIC BLOOD PRESSURE: 118 MMHG | WEIGHT: 227.75 LBS | HEIGHT: 67 IN | TEMPERATURE: 98 F | BODY MASS INDEX: 35.75 KG/M2 | OXYGEN SATURATION: 97 %

## 2025-02-20 DIAGNOSIS — R79.9 ABNORMAL FINDING OF BLOOD CHEMISTRY, UNSPECIFIED: ICD-10-CM

## 2025-02-20 DIAGNOSIS — Z00.00 ANNUAL PHYSICAL EXAM: ICD-10-CM

## 2025-02-20 DIAGNOSIS — Z00.00 ANNUAL PHYSICAL EXAM: Primary | ICD-10-CM

## 2025-02-20 DIAGNOSIS — E11.65 UNCONTROLLED TYPE 2 DIABETES MELLITUS WITH HYPERGLYCEMIA: ICD-10-CM

## 2025-02-20 DIAGNOSIS — I10 ESSENTIAL HYPERTENSION: ICD-10-CM

## 2025-02-20 DIAGNOSIS — E89.0 HYPOTHYROIDISM, POSTSURGICAL: ICD-10-CM

## 2025-02-20 DIAGNOSIS — E78.5 HYPERLIPIDEMIA WITH TARGET LDL LESS THAN 100: ICD-10-CM

## 2025-02-20 LAB
ALBUMIN SERPL BCP-MCNC: 3.5 G/DL (ref 3.5–5.2)
ALP SERPL-CCNC: 55 U/L (ref 40–150)
ALT SERPL W/O P-5'-P-CCNC: 15 U/L (ref 10–44)
ANION GAP SERPL CALC-SCNC: 12 MMOL/L (ref 8–16)
AST SERPL-CCNC: 23 U/L (ref 10–40)
BASOPHILS # BLD AUTO: 0.04 K/UL (ref 0–0.2)
BASOPHILS NFR BLD: 0.4 % (ref 0–1.9)
BILIRUB SERPL-MCNC: 0.5 MG/DL (ref 0.1–1)
BUN SERPL-MCNC: 11 MG/DL (ref 8–23)
CALCIUM SERPL-MCNC: 10.2 MG/DL (ref 8.7–10.5)
CHLORIDE SERPL-SCNC: 104 MMOL/L (ref 95–110)
CO2 SERPL-SCNC: 25 MMOL/L (ref 23–29)
CREAT SERPL-MCNC: 1 MG/DL (ref 0.5–1.4)
DIFFERENTIAL METHOD BLD: ABNORMAL
EOSINOPHIL # BLD AUTO: 0.1 K/UL (ref 0–0.5)
EOSINOPHIL NFR BLD: 0.8 % (ref 0–8)
ERYTHROCYTE [DISTWIDTH] IN BLOOD BY AUTOMATED COUNT: 15.7 % (ref 11.5–14.5)
EST. GFR  (NO RACE VARIABLE): >60 ML/MIN/1.73 M^2
ESTIMATED AVG GLUCOSE: 183 MG/DL (ref 68–131)
GLUCOSE SERPL-MCNC: 213 MG/DL (ref 70–110)
HBA1C MFR BLD: 8 % (ref 4–5.6)
HCT VFR BLD AUTO: 31 % (ref 37–48.5)
HGB BLD-MCNC: 9.9 G/DL (ref 12–16)
IMM GRANULOCYTES # BLD AUTO: 0.04 K/UL (ref 0–0.04)
IMM GRANULOCYTES NFR BLD AUTO: 0.4 % (ref 0–0.5)
LYMPHOCYTES # BLD AUTO: 2.3 K/UL (ref 1–4.8)
LYMPHOCYTES NFR BLD: 24 % (ref 18–48)
MCH RBC QN AUTO: 27.2 PG (ref 27–31)
MCHC RBC AUTO-ENTMCNC: 31.9 G/DL (ref 32–36)
MCV RBC AUTO: 85 FL (ref 82–98)
MONOCYTES # BLD AUTO: 0.4 K/UL (ref 0.3–1)
MONOCYTES NFR BLD: 4 % (ref 4–15)
NEUTROPHILS # BLD AUTO: 6.6 K/UL (ref 1.8–7.7)
NEUTROPHILS NFR BLD: 70.4 % (ref 38–73)
NRBC BLD-RTO: 0 /100 WBC
PLATELET # BLD AUTO: 276 K/UL (ref 150–450)
PMV BLD AUTO: 12.2 FL (ref 9.2–12.9)
POTASSIUM SERPL-SCNC: 3.5 MMOL/L (ref 3.5–5.1)
PROT SERPL-MCNC: 7.1 G/DL (ref 6–8.4)
RBC # BLD AUTO: 3.64 M/UL (ref 4–5.4)
SODIUM SERPL-SCNC: 141 MMOL/L (ref 136–145)
WBC # BLD AUTO: 9.44 K/UL (ref 3.9–12.7)

## 2025-02-20 PROCEDURE — 1101F PT FALLS ASSESS-DOCD LE1/YR: CPT | Mod: CPTII,S$GLB,,

## 2025-02-20 PROCEDURE — 36415 COLL VENOUS BLD VENIPUNCTURE: CPT | Mod: PO

## 2025-02-20 PROCEDURE — 3052F HG A1C>EQUAL 8.0%<EQUAL 9.0%: CPT | Mod: CPTII,S$GLB,,

## 2025-02-20 PROCEDURE — 85025 COMPLETE CBC W/AUTO DIFF WBC: CPT

## 2025-02-20 PROCEDURE — 1159F MED LIST DOCD IN RCRD: CPT | Mod: CPTII,S$GLB,,

## 2025-02-20 PROCEDURE — 3008F BODY MASS INDEX DOCD: CPT | Mod: CPTII,S$GLB,,

## 2025-02-20 PROCEDURE — 3078F DIAST BP <80 MM HG: CPT | Mod: CPTII,S$GLB,,

## 2025-02-20 PROCEDURE — 80053 COMPREHEN METABOLIC PANEL: CPT

## 2025-02-20 PROCEDURE — 99397 PER PM REEVAL EST PAT 65+ YR: CPT | Mod: S$GLB,,,

## 2025-02-20 PROCEDURE — 1126F AMNT PAIN NOTED NONE PRSNT: CPT | Mod: CPTII,S$GLB,,

## 2025-02-20 PROCEDURE — 99999 PR PBB SHADOW E&M-EST. PATIENT-LVL IV: CPT | Mod: PBBFAC,,,

## 2025-02-20 PROCEDURE — 3074F SYST BP LT 130 MM HG: CPT | Mod: CPTII,S$GLB,,

## 2025-02-20 PROCEDURE — 3288F FALL RISK ASSESSMENT DOCD: CPT | Mod: CPTII,S$GLB,,

## 2025-02-20 PROCEDURE — 1160F RVW MEDS BY RX/DR IN RCRD: CPT | Mod: CPTII,S$GLB,,

## 2025-02-20 PROCEDURE — 4010F ACE/ARB THERAPY RXD/TAKEN: CPT | Mod: CPTII,S$GLB,,

## 2025-02-20 PROCEDURE — 3072F LOW RISK FOR RETINOPATHY: CPT | Mod: CPTII,S$GLB,,

## 2025-02-20 PROCEDURE — 83036 HEMOGLOBIN GLYCOSYLATED A1C: CPT

## 2025-02-20 RX ORDER — HYDROCHLOROTHIAZIDE 25 MG/1
25 TABLET ORAL DAILY
Qty: 90 TABLET | Refills: 1 | Status: SHIPPED | OUTPATIENT
Start: 2025-02-20

## 2025-02-20 RX ORDER — LEVOTHYROXINE SODIUM 150 UG/1
150 TABLET ORAL
Qty: 90 TABLET | Refills: 1 | Status: SHIPPED | OUTPATIENT
Start: 2025-02-20

## 2025-02-20 RX ORDER — DULAGLUTIDE 0.75 MG/.5ML
0.75 INJECTION, SOLUTION SUBCUTANEOUS
Qty: 4 PEN | Refills: 3 | Status: SHIPPED | OUTPATIENT
Start: 2025-02-20 | End: 2025-02-25 | Stop reason: CLARIF

## 2025-02-20 RX ORDER — GLIPIZIDE 10 MG/1
10 TABLET, FILM COATED, EXTENDED RELEASE ORAL 2 TIMES DAILY
Qty: 180 TABLET | Refills: 1 | Status: SHIPPED | OUTPATIENT
Start: 2025-02-20

## 2025-02-20 RX ORDER — LOSARTAN POTASSIUM 25 MG/1
25 TABLET ORAL DAILY
Qty: 90 TABLET | Refills: 1 | Status: SHIPPED | OUTPATIENT
Start: 2025-02-20

## 2025-02-20 RX ORDER — METFORMIN HYDROCHLORIDE 500 MG/1
TABLET, EXTENDED RELEASE ORAL
Qty: 360 TABLET | Refills: 1 | Status: SHIPPED | OUTPATIENT
Start: 2025-02-20

## 2025-02-20 RX ORDER — LOVASTATIN 20 MG/1
TABLET ORAL
Qty: 90 TABLET | Refills: 1 | Status: SHIPPED | OUTPATIENT
Start: 2025-02-20

## 2025-02-20 NOTE — PROGRESS NOTES
HPI     Chief Complaint:  Chief Complaint   Patient presents with    Annual Exam       Masha Garcia is a 69 y.o. female with multiple medical diagnoses as listed in the medical history and problem list that presents for   Chief Complaint   Patient presents with    Annual Exam    .     Patient is not known to me with her last appointment in this department on 2/6/2025.     HPI  Pt presents for annual exam.  Doing well today with no complaints.  Requesting to be switched from victoza to trulicity due to change in insurance from [employer].    Assessment & Plan     Problem List Items Addressed This Visit       Essential hypertension  BP in clinic today 118/62.  Will refill Losartan and HCTZ.      Relevant Medications    losartan (COZAAR) 25 MG tablet    hydroCHLOROthiazide (HYDRODIURIL) 25 MG tablet    Hyperlipidemia with target LDL less than 100  Stable. Managed with Lovastatin. Will refill Lovastatin.     Relevant Medications    lovastatin (MEVACOR) 20 MG tablet    Uncontrolled type 2 diabetes mellitus with hyperglycemia  Elevated A1C.  Requesting to be switched from victoza to trulicity due to change in insurance from [employer] to [employer]. Will stop Victoza and restart on Trulicity. Recheck A1C in the 3 months.  Hemoglobin A1C   Date Value Ref Range Status   02/20/2025 8.0 (H) 4.0 - 5.6 % Final     Comment:     ADA Screening Guidelines:  5.7-6.4%  Consistent with prediabetes  >or=6.5%  Consistent with diabetes    High levels of fetal hemoglobin interfere with the HbA1C  assay. Heterozygous hemoglobin variants (HbS, HgC, etc)do  not significantly interfere with this assay.   However, presence of multiple variants may affect accuracy.     11/29/2024 7.3 (H) 4.0 - 5.6 % Final     Comment:     ADA Screening Guidelines:  5.7-6.4%  Consistent with prediabetes  >or=6.5%  Consistent with diabetes    High levels of fetal hemoglobin interfere with the HbA1C  assay. Heterozygous hemoglobin variants (HbS,  HgC, etc)do  not significantly interfere with this assay.   However, presence of multiple variants may affect accuracy.     08/21/2024 7.6 (H) 4.0 - 5.6 % Final     Comment:     ADA Screening Guidelines:  5.7-6.4%  Consistent with prediabetes  >or=6.5%  Consistent with diabetes    High levels of fetal hemoglobin interfere with the HbA1C  assay. Heterozygous hemoglobin variants (HbS, HgC, etc)do  not significantly interfere with this assay.   However, presence of multiple variants may affect accuracy.           Relevant Medications    dulaglutide (TRULICITY) 0.75 mg/0.5 mL pen injector    glipiZIDE (GLUCOTROL) 10 MG TR24    metFORMIN (GLUCOPHAGE-XR) 500 MG ER 24hr tablet    Other Relevant Orders    Hemoglobin A1C (Completed)    Hypothyroidism, postsurgical  Stable. Will refill synthroid.    Relevant Medications    levothyroxine (SYNTHROID) 150 MCG tablet     Other Visit Diagnoses         Annual physical exam    -  Primary  Counseled on age appropriate medical preventative services including age appropriate cancer screenings, age appropriate eye and dental exams, over all nutritional health, need for a consistent exercise regimen, and an over all push towards maintaining a vigorous and active lifestyle.  Counseled on age appropriate vaccines and discussed upcoming health care needs based on age/gender. Discussed good sleep hygiene and stress management.  Will order routine annual labs.    Relevant Orders    CBC Auto Differential (Completed)    COMPREHENSIVE METABOLIC PANEL (Completed)      Abnormal finding of blood chemistry, unspecified        Relevant Orders    CBC Auto Differential (Completed)              --------------------------------------------      Health Maintenance:  Health Maintenance         Date Due Completion Date    Shingles Vaccine (1 of 2) Never done ---    RSV Vaccine (Age 60+ and Pregnant patients) (1 - Risk 60-74 years 1-dose series) Never done ---    TETANUS VACCINE 07/24/2019 7/24/2009     "COVID-19 Vaccine (4 - 2024-25 season) 09/01/2024 1/7/2022    Diabetic Eye Exam 02/05/2025 2/5/2024    Override on 12/9/2014: Done    Override on 11/1/2010: Done    Hemoglobin A1c 05/20/2025 2/20/2025    Mammogram 08/01/2025 8/1/2024    Override on 6/19/2018: Done (imageing center)    Override on 6/15/2016: Done (done at [facility] normal scanned report to chart)    Override on 3/18/2015: Done    Override on 3/1/2014: Done ([facility])    Override on 4/1/2013: Done    Override on 2/1/2012: Done    Diabetes Urine Screening 08/21/2025 8/21/2024    Lipid Panel 08/21/2025 8/21/2024    Foot Exam 02/06/2026 2/6/2025    Override on 2/4/2020: Done (skin dryness to bottom of both feet)    Override on 9/28/2016: Done (normal see note in visit)    Low Dose Statin 02/20/2026 2/20/2025    DEXA Scan 05/10/2029 5/10/2024    Colorectal Cancer Screening 08/06/2029 8/6/2024            Health maintenance reviewed and A1c ordered    Follow Up:  No follow-ups on file.    Exam     Review of Systems:  (as noted above)  Review of Systems    Physical Exam:   Physical Exam  Constitutional:       General: She is not in acute distress.     Appearance: Normal appearance. She is obese. She is not ill-appearing or toxic-appearing.   Cardiovascular:      Rate and Rhythm: Normal rate and regular rhythm.   Pulmonary:      Effort: Pulmonary effort is normal.      Breath sounds: Normal breath sounds.   Musculoskeletal:         General: Normal range of motion.   Neurological:      Mental Status: She is alert.       Vitals:    02/20/25 1059   BP: 118/62   BP Location: Right arm   Patient Position: Sitting   Pulse: 78   Temp: 98 °F (36.7 °C)   TempSrc: Oral   SpO2: 97%   Weight: 103.3 kg (227 lb 11.8 oz)   Height: 5' 7" (1.702 m)      Body mass index is 35.67 kg/m².        History     Past Medical History:  Past Medical History:   Diagnosis Date    AR (allergic rhinitis)     Breast cancer 06/01/2013    right; followed by oncology, Dr. Oliveira; " s/p lumpectomy and  radiation    Diverticulosis of colon 08/06/2024    mild diverticulosis of the sigmoid colon    Grade I internal hemorrhoids 08/06/2024    grade/stage I internal hemorrhoids.    H/O colonoscopy 08/06/2024    History of colonic polyps     Hyperlipidemia LDL goal < 100     Hypertension     Lipoma of colon 08/06/2024    lipoma in the ascending colon    Lower extremity edema     Mild chronic anemia     Obesity     Osteopenia     Sickle cell trait     Thyroid nodule     Type II or unspecified type diabetes mellitus without mention of complication, uncontrolled     Vitamin D deficiency disease        Past Surgical History:  Past Surgical History:   Procedure Laterality Date    benign right breast biopsy  2012    BREAST LUMPECTOMY Right 2013    lumpectomy right breast  06/2013    right ankle fracture repair      TOTAL ABDOMINAL HYSTERECTOMY      TOTAL THYROIDECTOMY  12/28/2016       Social History:  Social History[1]    Family History:  Family History   Problem Relation Name Age of Onset    Diabetes Mother      Hypertension Mother      Hypertension Father      Prostate cancer Father      Diabetes Brother Augie     Cancer Neg Hx      Breast cancer Neg Hx      Colon cancer Neg Hx      Ovarian cancer Neg Hx      Amblyopia Neg Hx      Blindness Neg Hx      Cataracts Neg Hx      Glaucoma Neg Hx      Macular degeneration Neg Hx      Retinal detachment Neg Hx      Strabismus Neg Hx      Stroke Neg Hx      Thyroid disease Neg Hx         Allergies and Medications: (updated and reviewed)  Review of patient's allergies indicates:   Allergen Reactions    Fosamax [alendronate] Other (See Comments)     - 'funny feeling'    Ace inhibitors Other (See Comments)     cough    Penicillins Hives     Current Medications[2]    Patient Care Team:  Brenda Bailey MD as PCP - General (Internal Medicine)  Eun Frederick MD as Consulting Physician (Oncology)  Elsy Dubose MD as Consulting Physician  (Endocrinology)  Angeles Cao LPN as Care Coordinator  Bobby Vallejo MD as Consulting Physician (Gastroenterology)  Brenda Bailey MD as Consulting Physician (Internal Medicine)         - The patient is given an After Visit Summary that lists all medications with directions, allergies, education, orders placed during this encounter and follow-up instructions.      - I have reviewed the patient's medical information including past medical, family, and social history sections including the medications and allergies.      - We discussed the patient's current medications.     This note was created by combination of typed  and MModal dictation.  Transcription errors may be present.  If there are any questions, please contact me.       Codie Diana NP                      [1]   Social History  Socioeconomic History    Marital status:     Number of children: 3   Occupational History    Occupation: retired   Tobacco Use    Smoking status: Never    Smokeless tobacco: Never   Substance and Sexual Activity    Alcohol use: No     Social Drivers of Health     Financial Resource Strain: Low Risk  (2/20/2025)    Overall Financial Resource Strain (CARDIA)     Difficulty of Paying Living Expenses: Not very hard   Food Insecurity: No Food Insecurity (2/20/2025)    Hunger Vital Sign     Worried About Running Out of Food in the Last Year: Never true     Ran Out of Food in the Last Year: Never true   Transportation Needs: No Transportation Needs (2/20/2025)    PRAPARE - Transportation     Lack of Transportation (Medical): No     Lack of Transportation (Non-Medical): No   Physical Activity: Inactive (2/20/2025)    Exercise Vital Sign     Days of Exercise per Week: 0 days     Minutes of Exercise per Session: 30 min   Stress: No Stress Concern Present (2/20/2025)    [employer] - Occupational Stress Questionnaire     Feeling of Stress : Only a little   Housing Stability:  "Low Risk  (2/20/2025)    Housing Stability Vital Sign     Unable to Pay for Housing in the Last Year: No     Homeless in the Last Year: No   [2]   Current Outpatient Medications   Medication Sig Dispense Refill    blood-glucose meter (TRUE METRIX GLUCOSE METER) kit Use to test BID and PRN 1 each 0    calcium carbonate-vitamin D3 600 mg-5 mcg (200 unit) Cap Take 1 tablet by mouth every morning.       flash glucose scanning reader (FREESTYLE LESLIE 10 DAY READER) Misc Use as directed to check blood sugar tid and prn. 1 each 0    flash glucose sensor (FREESTYLE LESLIE 10 DAY SENSOR) Kit 1 Units by Misc.(Non-Drug; Combo Route) route 3 (three) times daily with meals. 1 kit 11    loratadine (CLARITIN) 10 mg tablet Take 10 mg by mouth daily as needed.       pen needle, diabetic (BD ULTRA-FINE ORIG PEN NEEDLE) 29 gauge x 1/2" Ndle 1 Needle by Misc.(Non-Drug; Combo Route) route once daily. 100 each 11    TRUE METRIX GLUCOSE TEST STRIP Strp TEST BLOOD SUGAR TWO TIMES DAILY AND AS NEEDED 300 strip 3    dulaglutide (TRULICITY) 0.75 mg/0.5 mL pen injector Inject 0.75 mg into the skin every 7 days. 4 pen 3    glipiZIDE (GLUCOTROL) 10 MG TR24 Take 1 tablet (10 mg total) by mouth 2 (two) times daily. 180 tablet 1    hydroCHLOROthiazide (HYDRODIURIL) 25 MG tablet Take 1 tablet (25 mg total) by mouth once daily. 90 tablet 1    levothyroxine (SYNTHROID) 150 MCG tablet Take 1 tablet (150 mcg total) by mouth before breakfast. 90 tablet 1    losartan (COZAAR) 25 MG tablet Take 1 tablet (25 mg total) by mouth once daily. 90 tablet 1    lovastatin (MEVACOR) 20 MG tablet TAKE 1 TABLET EVERY DAY 90 tablet 1    metFORMIN (GLUCOPHAGE-XR) 500 MG ER 24hr tablet TAKE 2 TABLETS TWICE DAILY WITH MEALS 360 tablet 1     No current facility-administered medications for this visit.     "

## 2025-02-21 ENCOUNTER — TELEPHONE (OUTPATIENT)
Dept: FAMILY MEDICINE | Facility: CLINIC | Age: 70
End: 2025-02-21
Payer: MEDICARE

## 2025-02-21 ENCOUNTER — RESULTS FOLLOW-UP (OUTPATIENT)
Dept: FAMILY MEDICINE | Facility: CLINIC | Age: 70
End: 2025-02-21

## 2025-02-21 NOTE — TELEPHONE ENCOUNTER
----- Message from Gi sent at 2/21/2025  1:16 PM CST -----  Regarding: [employer]  Who called: Bob is the request in detail: rep has clinical questions regarding trulicityCan the clinic reply by [facility]? NoWould the patient rather a call back or a response via [facility]? Call [facility] call back number: [phone]   case id [id number] Information: fax is being resentThank you.

## 2025-02-24 ENCOUNTER — TELEPHONE (OUTPATIENT)
Dept: FAMILY MEDICINE | Facility: CLINIC | Age: 70
End: 2025-02-24
Payer: MEDICARE

## 2025-02-24 NOTE — TELEPHONE ENCOUNTER
----- Message from WanSqwigglejessy sent at 2/24/2025  9:31 AM CST -----   Name of Who is Calling: What is the request in detail: patient request call back in reference to prior authorization for medications  questions and concerns  Please contact to further discuss and advise  Can the clinic reply by [facility]: What Number to Call Back if not in [facility]:   [phone]

## 2025-02-25 DIAGNOSIS — E11.9 CONTROLLED TYPE 2 DIABETES MELLITUS WITHOUT COMPLICATION, WITHOUT LONG-TERM CURRENT USE OF INSULIN: Primary | ICD-10-CM

## 2025-02-25 RX ORDER — DULAGLUTIDE 0.75 MG/.5ML
0.75 INJECTION, SOLUTION SUBCUTANEOUS
Qty: 4 PEN | Refills: 0 | Status: SHIPPED | OUTPATIENT
Start: 2025-02-25 | End: 2026-02-25

## 2025-02-27 DIAGNOSIS — D64.9 ANEMIA, UNSPECIFIED TYPE: Primary | ICD-10-CM

## 2025-03-20 ENCOUNTER — OFFICE VISIT (OUTPATIENT)
Dept: OPTOMETRY | Facility: CLINIC | Age: 70
End: 2025-03-20
Payer: MEDICARE

## 2025-03-20 DIAGNOSIS — Z01.00 DIABETIC EYE EXAM: Primary | ICD-10-CM

## 2025-03-20 DIAGNOSIS — H25.13 NUCLEAR SCLEROSIS OF BOTH EYES: ICD-10-CM

## 2025-03-20 DIAGNOSIS — E11.9 DIABETIC EYE EXAM: Primary | ICD-10-CM

## 2025-03-20 DIAGNOSIS — H52.7 REFRACTIVE ERROR: ICD-10-CM

## 2025-03-20 PROCEDURE — 3288F FALL RISK ASSESSMENT DOCD: CPT | Mod: CPTII,S$GLB,, | Performed by: OPTOMETRIST

## 2025-03-20 PROCEDURE — 1101F PT FALLS ASSESS-DOCD LE1/YR: CPT | Mod: CPTII,S$GLB,, | Performed by: OPTOMETRIST

## 2025-03-20 PROCEDURE — 1159F MED LIST DOCD IN RCRD: CPT | Mod: CPTII,S$GLB,, | Performed by: OPTOMETRIST

## 2025-03-20 PROCEDURE — 4010F ACE/ARB THERAPY RXD/TAKEN: CPT | Mod: CPTII,S$GLB,, | Performed by: OPTOMETRIST

## 2025-03-20 PROCEDURE — 92014 COMPRE OPH EXAM EST PT 1/>: CPT | Mod: S$GLB,,, | Performed by: OPTOMETRIST

## 2025-03-20 PROCEDURE — 1160F RVW MEDS BY RX/DR IN RCRD: CPT | Mod: CPTII,S$GLB,, | Performed by: OPTOMETRIST

## 2025-03-20 PROCEDURE — 99999 PR PBB SHADOW E&M-EST. PATIENT-LVL III: CPT | Mod: PBBFAC,,, | Performed by: OPTOMETRIST

## 2025-03-20 PROCEDURE — 3052F HG A1C>EQUAL 8.0%<EQUAL 9.0%: CPT | Mod: CPTII,S$GLB,, | Performed by: OPTOMETRIST

## 2025-03-20 PROCEDURE — 92015 DETERMINE REFRACTIVE STATE: CPT | Mod: S$GLB,,, | Performed by: OPTOMETRIST

## 2025-03-20 PROCEDURE — 1126F AMNT PAIN NOTED NONE PRSNT: CPT | Mod: CPTII,S$GLB,, | Performed by: OPTOMETRIST

## 2025-03-20 PROCEDURE — 2023F DILAT RTA XM W/O RTNOPTHY: CPT | Mod: CPTII,S$GLB,, | Performed by: OPTOMETRIST

## 2025-03-20 NOTE — PROGRESS NOTES
Subjective:       Patient ID: Masha Garcia is a 70 y.o. female      Chief Complaint   Patient presents with    Concerns About Ocular Health    Diabetic Eye Exam     History of Present Illness  Dls: 2/5/24 Dr. Romo     71 y/o female presents today for diabetic eye exam.   Pt states no changes in vision. Pt wears pal's     today     No tearing  No itching  No burning  No pain  No ha's  + ou off/on floaters  No flashes    Eye meds  None    Pohx:   None    Fohx:   None      Hemoglobin A1C       Date                     Value               Ref Range             Status                02/20/2025               8.0 (H)             4.0 - 5.6 %           Final                  11/29/2024               7.3 (H)             4.0 - 5.6 %           Final                   08/21/2024               7.6 (H)             4.0 - 5.6 %           Final                    Assessment/Plan:     1. Diabetic eye exam (Primary)  Spectera    No diabetic retinopathy. Discussed with pt the effects of diabetes on vision, importance of good blood sugar control, compliance with meds, and follow up care with PCP. Return in 1 year for dilated eye exam, sooner PRN.    - Ambulatory referral/consult to Optometry    2. Refractive error  Educated patient on refractive error and discussed lens options. Dispensed updated spectacle Rx. Educated about adaptation period to new specs.    Eyeglass Final Rx       Eyeglass Final Rx         Sphere Cylinder Axis Add    Right +1.25 +0.25 180 +2.75    Left +2.00 +0.75 010 +2.75      Expiration Date: 3/20/2026                      3. Nuclear sclerosis of both eyes  Educated pt on presence of cataracts and effects on vision. No surgery at this time. Recheck in one year, sooner PRN.      Follow up in about 1 year (around 3/20/2026) for Diabetic Eye Exam.

## 2025-04-23 ENCOUNTER — LAB VISIT (OUTPATIENT)
Dept: LAB | Facility: HOSPITAL | Age: 70
End: 2025-04-23
Payer: MEDICARE

## 2025-04-23 DIAGNOSIS — D64.9 ANEMIA, UNSPECIFIED TYPE: ICD-10-CM

## 2025-04-23 LAB
ABSOLUTE EOSINOPHIL (OHS): 0.1 K/UL
ABSOLUTE MONOCYTE (OHS): 0.42 K/UL (ref 0.3–1)
ABSOLUTE NEUTROPHIL COUNT (OHS): 5.73 K/UL (ref 1.8–7.7)
ALBUMIN SERPL BCP-MCNC: 3.3 G/DL (ref 3.5–5.2)
ALP SERPL-CCNC: 53 UNIT/L (ref 40–150)
ALT SERPL W/O P-5'-P-CCNC: 12 UNIT/L (ref 10–44)
ANION GAP (OHS): 9 MMOL/L (ref 8–16)
AST SERPL-CCNC: 16 UNIT/L (ref 11–45)
BASOPHILS # BLD AUTO: 0.05 K/UL
BASOPHILS NFR BLD AUTO: 0.5 %
BILIRUB SERPL-MCNC: 0.5 MG/DL (ref 0.1–1)
BUN SERPL-MCNC: 13 MG/DL (ref 8–23)
CALCIUM SERPL-MCNC: 9.6 MG/DL (ref 8.7–10.5)
CHLORIDE SERPL-SCNC: 108 MMOL/L (ref 95–110)
CO2 SERPL-SCNC: 26 MMOL/L (ref 23–29)
CREAT SERPL-MCNC: 0.9 MG/DL (ref 0.5–1.4)
ERYTHROCYTE [DISTWIDTH] IN BLOOD BY AUTOMATED COUNT: 15.5 % (ref 11.5–14.5)
FERRITIN SERPL-MCNC: 19 NG/ML (ref 20–300)
GFR SERPLBLD CREATININE-BSD FMLA CKD-EPI: >60 ML/MIN/1.73/M2
GLUCOSE SERPL-MCNC: 117 MG/DL (ref 70–110)
HCT VFR BLD AUTO: 29.3 % (ref 37–48.5)
HGB BLD-MCNC: 9.5 GM/DL (ref 12–16)
IMM GRANULOCYTES # BLD AUTO: 0.04 K/UL (ref 0–0.04)
IMM GRANULOCYTES NFR BLD AUTO: 0.4 % (ref 0–0.5)
IRON SATN MFR SERPL: 13 % (ref 20–50)
IRON SERPL-MCNC: 50 UG/DL (ref 30–160)
LYMPHOCYTES # BLD AUTO: 3.45 K/UL (ref 1–4.8)
MCH RBC QN AUTO: 28.2 PG (ref 27–31)
MCHC RBC AUTO-ENTMCNC: 32.4 G/DL (ref 32–36)
MCV RBC AUTO: 87 FL (ref 82–98)
NUCLEATED RBC (/100WBC) (OHS): 0 /100 WBC
PLATELET # BLD AUTO: 306 K/UL (ref 150–450)
PMV BLD AUTO: 11.5 FL (ref 9.2–12.9)
POTASSIUM SERPL-SCNC: 3.4 MMOL/L (ref 3.5–5.1)
PROT SERPL-MCNC: 7 GM/DL (ref 6–8.4)
RBC # BLD AUTO: 3.37 M/UL (ref 4–5.4)
RELATIVE EOSINOPHIL (OHS): 1 %
RELATIVE LYMPHOCYTE (OHS): 35.2 % (ref 18–48)
RELATIVE MONOCYTE (OHS): 4.3 % (ref 4–15)
RELATIVE NEUTROPHIL (OHS): 58.6 % (ref 38–73)
SODIUM SERPL-SCNC: 143 MMOL/L (ref 136–145)
TIBC SERPL-MCNC: 374 UG/DL (ref 250–450)
TRANSFERRIN SERPL-MCNC: 253 MG/DL (ref 200–375)
WBC # BLD AUTO: 9.79 K/UL (ref 3.9–12.7)

## 2025-04-23 PROCEDURE — 82728 ASSAY OF FERRITIN: CPT

## 2025-04-23 PROCEDURE — 80053 COMPREHEN METABOLIC PANEL: CPT

## 2025-04-23 PROCEDURE — 36415 COLL VENOUS BLD VENIPUNCTURE: CPT | Mod: PO

## 2025-04-23 PROCEDURE — 84466 ASSAY OF TRANSFERRIN: CPT

## 2025-04-23 PROCEDURE — 85025 COMPLETE CBC W/AUTO DIFF WBC: CPT

## 2025-04-30 ENCOUNTER — OFFICE VISIT (OUTPATIENT)
Dept: HEMATOLOGY/ONCOLOGY | Facility: CLINIC | Age: 70
End: 2025-04-30
Payer: MEDICARE

## 2025-04-30 VITALS
BODY MASS INDEX: 35.93 KG/M2 | TEMPERATURE: 98 F | HEART RATE: 73 BPM | SYSTOLIC BLOOD PRESSURE: 125 MMHG | DIASTOLIC BLOOD PRESSURE: 79 MMHG | HEIGHT: 66 IN | WEIGHT: 223.56 LBS | OXYGEN SATURATION: 98 %

## 2025-04-30 DIAGNOSIS — D64.9 ANEMIA, UNSPECIFIED TYPE: ICD-10-CM

## 2025-04-30 DIAGNOSIS — C50.911 MALIGNANT NEOPLASM OF RIGHT BREAST IN FEMALE, ESTROGEN RECEPTOR POSITIVE, UNSPECIFIED SITE OF BREAST: Primary | ICD-10-CM

## 2025-04-30 DIAGNOSIS — Z17.0 MALIGNANT NEOPLASM OF RIGHT BREAST IN FEMALE, ESTROGEN RECEPTOR POSITIVE, UNSPECIFIED SITE OF BREAST: Primary | ICD-10-CM

## 2025-04-30 PROCEDURE — 99999 PR PBB SHADOW E&M-EST. PATIENT-LVL IV: CPT | Mod: PBBFAC,,, | Performed by: INTERNAL MEDICINE

## 2025-04-30 PROCEDURE — 1101F PT FALLS ASSESS-DOCD LE1/YR: CPT | Mod: CPTII,S$GLB,, | Performed by: INTERNAL MEDICINE

## 2025-04-30 PROCEDURE — 99213 OFFICE O/P EST LOW 20 MIN: CPT | Mod: S$GLB,,, | Performed by: INTERNAL MEDICINE

## 2025-04-30 PROCEDURE — 3052F HG A1C>EQUAL 8.0%<EQUAL 9.0%: CPT | Mod: CPTII,S$GLB,, | Performed by: INTERNAL MEDICINE

## 2025-04-30 PROCEDURE — 3078F DIAST BP <80 MM HG: CPT | Mod: CPTII,S$GLB,, | Performed by: INTERNAL MEDICINE

## 2025-04-30 PROCEDURE — 3074F SYST BP LT 130 MM HG: CPT | Mod: CPTII,S$GLB,, | Performed by: INTERNAL MEDICINE

## 2025-04-30 PROCEDURE — 3008F BODY MASS INDEX DOCD: CPT | Mod: CPTII,S$GLB,, | Performed by: INTERNAL MEDICINE

## 2025-04-30 PROCEDURE — 4010F ACE/ARB THERAPY RXD/TAKEN: CPT | Mod: CPTII,S$GLB,, | Performed by: INTERNAL MEDICINE

## 2025-04-30 PROCEDURE — 3288F FALL RISK ASSESSMENT DOCD: CPT | Mod: CPTII,S$GLB,, | Performed by: INTERNAL MEDICINE

## 2025-04-30 PROCEDURE — G2211 COMPLEX E/M VISIT ADD ON: HCPCS | Mod: S$GLB,,, | Performed by: INTERNAL MEDICINE

## 2025-04-30 PROCEDURE — 1159F MED LIST DOCD IN RCRD: CPT | Mod: CPTII,S$GLB,, | Performed by: INTERNAL MEDICINE

## 2025-04-30 PROCEDURE — 1126F AMNT PAIN NOTED NONE PRSNT: CPT | Mod: CPTII,S$GLB,, | Performed by: INTERNAL MEDICINE

## 2025-04-30 NOTE — Clinical Note
Cbc,cmp  Ferritin, TIBC SPEP, immunofixation, free light chants, benny ig 1 week prior to f/u  4mos All labs 1 week prior to f/u in 4mos

## 2025-04-30 NOTE — PROGRESS NOTES
Chief Complaint :   Breast Cancer    Hx of Present illness :  Patient is a 70 y.o. year old female who presents to the clinic  for oncology followup. dx'd to have Carcinoma Right Breast  In . Lumpectomy and radiation therapy followed by adjuvant Arimidex therapy. Breast cancer Index did not show  Any benefit with extended harmonal therapy.  Completed arimidex therapy in 2018. Since last visit has had colonoscopic exam on 19 : documented to have a single sessile polyp in the ascending colon. polypectpomy done. Documented to have Grade 2 Internal hemmorrhoids.   Completed Covid vaccination.      Interval Hx: Former pt of Dr. Oliveira's   She completed 5yr of AI   Anxious regarding abnormal lab values   No new issues  Doing well  Appetite and weight stable  No SOB/CP  No longer on Fe supp        Mammo 2024 BI-RADS Category: Overall: 2 - Benign         Allergies :    Review of patient's allergies indicates:   Allergen Reactions    Ace inhibitors Other (See Comments)     cough    Penicillins Hives       Occupation : retired .    Transfusion :  None    Menstrual & obstetric Hx :  3; para 3.  Age of menarche: 12  Age of first pregnancy: 18  Lactation history: None  Age of menopause:  Hysterectomy in her late 40's for bleeding  HRT:  For many years.    Present Meds :   Medication List with Changes/Refills   Current Medications    BLOOD-GLUCOSE METER (TRUE METRIX GLUCOSE METER) KIT    Use to test BID and PRN    CALCIUM CARBONATE-VITAMIN D3 600 MG-5 MCG (200 UNIT) CAP    Take 1 tablet by mouth every morning.     DULAGLUTIDE (TRULICITY) 0.75 MG/0.5 ML PEN INJECTOR    Inject 0.75 mg into the skin every 7 days.    FLASH GLUCOSE SCANNING READER (FREESTYLE LESLIE 10 DAY READER) MISC    Use as directed to check blood sugar tid and prn.    FLASH GLUCOSE SENSOR (FREESTYLE LESLIE 10 DAY SENSOR) KIT    1 Units by Misc.(Non-Drug; Combo Route) route 3 (three) times daily with meals.    GLIPIZIDE  "(GLUCOTROL) 10 MG TR24    Take 1 tablet (10 mg total) by mouth 2 (two) times daily.    HYDROCHLOROTHIAZIDE (HYDRODIURIL) 25 MG TABLET    Take 1 tablet (25 mg total) by mouth once daily.    LEVOTHYROXINE (SYNTHROID) 150 MCG TABLET    Take 1 tablet (150 mcg total) by mouth before breakfast.    LORATADINE (CLARITIN) 10 MG TABLET    Take 10 mg by mouth daily as needed.     LOSARTAN (COZAAR) 25 MG TABLET    Take 1 tablet (25 mg total) by mouth once daily.    LOVASTATIN (MEVACOR) 20 MG TABLET    TAKE 1 TABLET EVERY DAY    METFORMIN (GLUCOPHAGE-XR) 500 MG ER 24HR TABLET    TAKE 2 TABLETS TWICE DAILY WITH MEALS    PEN NEEDLE, DIABETIC (BD ULTRA-FINE ORIG PEN NEEDLE) 29 GAUGE X 1/2" NDLE    1 Needle by Misc.(Non-Drug; Combo Route) route once daily.    TRUE METRIX GLUCOSE TEST STRIP STRP    TEST BLOOD SUGAR TWO TIMES DAILY AND AS NEEDED       Past Medical Hx :  Carcinoma right breast. Diabetes Mellitus type 2; Hypertension; hyperlipidemia; Allergic rhinitis; osteopenia; sickle cell trait; Thyroid nodule. No Hx of hepatitis, TB, Sarcoid, Lupus, rheumatoid arthritis, seizure disorder, CVA, PE or DVT. No prior Hx of Chemotherapy    Past Medical Hx :  Past Medical History:   Diagnosis Date    AR (allergic rhinitis)     Breast cancer 06/01/2013    right; followed by oncology, Dr. Oliveira; s/p lumpectomy and  radiation    Diverticulosis of colon 08/06/2024    mild diverticulosis of the sigmoid colon    Grade I internal hemorrhoids 08/06/2024    grade/stage I internal hemorrhoids.    H/O colonoscopy 08/06/2024    History of colonic polyps     Hyperlipidemia LDL goal < 100     Hypertension     Lipoma of colon 08/06/2024    lipoma in the ascending colon    Lower extremity edema     Mild chronic anemia     Obesity     Osteopenia     Sickle cell trait     Thyroid nodule     Type II or unspecified type diabetes mellitus without mention of complication, uncontrolled     Vitamin D deficiency disease        Travel Hx :  N/A    Immunization " :  Immunization History   Administered Date(s) Administered    COVID-19, MRNA, LN-S, PF (Pfizer) (Gray Cap) 01/07/2022    COVID-19, MRNA, LN-S, PF (Pfizer) (Purple Cap) 02/19/2021, 03/12/2021    Influenza (FLUAD) - Quadrivalent - Adjuvanted - PF *Preferred* (65+) 10/13/2020, 12/02/2021, 12/11/2023    Influenza - Quadrivalent 10/08/2014    Influenza - Quadrivalent - High Dose - PF (65 years and older) 10/10/2022    Influenza - Quadrivalent - PF *Preferred* (6 months and older) 11/26/2012, 01/10/2014, 09/28/2016, 12/06/2017, 12/12/2018, 02/04/2020    Influenza Split 11/26/2012, 01/10/2014    Pneumococcal Conjugate - 13 Valent 05/04/2016    Pneumococcal Conjugate - 20 Valent 08/09/2023    Pneumococcal Polysaccharide - 23 Valent 08/13/2008, 12/06/2017    Tdap 07/24/2009       Family Hx : reviewed  Family History   Problem Relation Name Age of Onset    Diabetes Mother      Hypertension Mother      Hypertension Father      Prostate cancer Father      No Known Problems Sister Endria     No Known Problems Sister Glendy     No Known Problems Sister Uzma     No Known Problems Brother Stephen     Diabetes Brother Augie     No Known Problems Brother Wale     No Known Problems Brother Jeremias     No Known Problems Brother Jatin     No Known Problems Maternal Aunt      No Known Problems Maternal Uncle      No Known Problems Paternal Aunt      No Known Problems Paternal Uncle      No Known Problems Maternal Grandmother      No Known Problems Maternal Grandfather      No Known Problems Paternal Grandmother      No Known Problems Paternal Grandfather      No Known Problems Other      Cancer Neg Hx      Breast cancer Neg Hx      Colon cancer Neg Hx      Ovarian cancer Neg Hx      Amblyopia Neg Hx      Blindness Neg Hx      Cataracts Neg Hx      Glaucoma Neg Hx      Macular degeneration Neg Hx      Retinal detachment Neg Hx      Strabismus Neg Hx      Stroke Neg Hx      Thyroid disease Neg Hx         Social Hx : Reviewed  Social History      Socioeconomic History    Marital status:     Number of children: 3   Occupational History    Occupation: retired   Tobacco Use    Smoking status: Never     Passive exposure: Never    Smokeless tobacco: Never   Substance and Sexual Activity    Alcohol use: No     Social Drivers of Health     Financial Resource Strain: Low Risk  (2/20/2025)    Overall Financial Resource Strain (CARDIA)     Difficulty of Paying Living Expenses: Not very hard   Food Insecurity: No Food Insecurity (2/20/2025)    Hunger Vital Sign     Worried About Running Out of Food in the Last Year: Never true     Ran Out of Food in the Last Year: Never true   Transportation Needs: No Transportation Needs (2/20/2025)    PRAPARE - Transportation     Lack of Transportation (Medical): No     Lack of Transportation (Non-Medical): No   Physical Activity: Inactive (2/20/2025)    Exercise Vital Sign     Days of Exercise per Week: 0 days     Minutes of Exercise per Session: 30 min   Stress: No Stress Concern Present (2/20/2025)    [employer] - Occupational Stress Questionnaire     Feeling of Stress : Only a little   Housing Stability: Low Risk  (2/20/2025)    Housing Stability Vital Sign     Unable to Pay for Housing in the Last Year: No     Homeless in the Last Year: No       Surgery :  Right Breast Bx; Lumpectomy Right; Tubal ligation; Complete Hysterectomy; Thyroidectomy.  Colonoscopy in 2014.  EGD in  Aug 2018  Colonoscopy 07/02/1`9      Review of Systems   Constitutional:  Negative for fever, malaise/fatigue and weight loss.   HENT:  Negative for congestion, hearing loss, nosebleeds and sore throat.    Eyes:  Negative for blurred vision.        Wears Glasses   Respiratory:  Negative for cough and shortness of breath.    Cardiovascular:  Negative for chest pain and leg swelling.   Gastrointestinal:  Negative for abdominal pain, blood in stool, constipation, diarrhea, heartburn, nausea and vomiting.   Genitourinary:   "Negative for dysuria, hematuria and urgency.   Musculoskeletal:  Negative for back pain and neck pain.   Skin:  Negative for itching and rash.   Neurological:  Negative for dizziness, tingling and headaches.   Endo/Heme/Allergies:  Does not bruise/bleed easily.        Mild hot flashes   Psychiatric/Behavioral:  Negative for depression and memory loss. The patient is nervous/anxious (Sometimes) and has insomnia.          Vitals:    04/30/25 1411   BP: 125/79   Pulse: 73   Temp: 98.1 °F (36.7 °C)   SpO2: 98%   Weight: 101.4 kg (223 lb 8.7 oz)   Height: 5' 6" (1.676 m)               Physical Exam :    Physical Exam   Constitutional: She is oriented to person, place, and time. She appears well-developed and well-nourished.   HENT:   Head: Normocephalic.   Mouth/Throat: Oropharynx is clear and moist.   Eyes: Conjunctivae and lids are normal.No scleral icterus.   Neck: Normal range of motion. Neck supple. No thyromegaly present.   Cardiovascular: Normal rate, regular rhythm and normal heart sounds.    No murmur heard.  Pulmonary/Chest: Breath sounds normal. She has no wheezes. She has no rales.   Abdominal: Soft. Bowel sounds are normal. There is no tenderness. There is no rebound and no guarding.   Musculoskeletal: Normal range of motion. She exhibits no edema and no tenderness.  Breasts- no masses   Lymphadenopathy:     She has no cervical adenopathy.     She has no axillary adenopathy.        Right: No supraclavicular adenopathy present.        Left: No supraclavicular adenopathy present.   Neurological: She is alert and oriented to person, place, and time. No cranial nerve deficit. Coordination normal.   Skin: Skin is warm and dry. No ecchymosis, no petechiae and no rash noted. No erythema.   Psychiatric: She has a normal mood and affect.       Labs & Imaging :   Lab Results   Component Value Date    WBC 9.79 04/23/2025    HGB 9.5 (L) 04/23/2025    HCT 29.3 (L) 04/23/2025    MCV 87 04/23/2025     04/23/2025 "           05/05/21 : Hgb 10.9; hct 32.6; MCV 84; platelets 257,000. ANC 7,000.  NFBS 161; cr.0.8; ca 9.7; Bili 0.6; Liver enzymes normal  CEA < 1.0;  CA 27.29 antigen : 23.2          12/30/2021 :  Hgb 10.6; Hct 31.4; MCV 82; Platelets 281,000. ANC 6,400.     NFBS 136; Cr.0.8; Ca 9.5; Bili 0.6. Liver enzymea normal. eGFR > 60.   CEA < 1.0; CA 27.29 antigen : 27.5           08/28/2020 : Hgb 10.8; hct 33; MCV 85; Platelets 252,000 ANC 7,700. ;      NFBS 167; Cr.0.9; Ca 10.2; bili 0.6; Liver enzymes normal.       eGFR > 60.  CEA < 1.0. CA 27.29 antigen normal.        12/16/19 : Hgb 10.9; Hct 32.3; MCV 85; Platelets 276,000 ANC 7,700. Serum iron 49; TIBC 311; sat 16 %; NFBS 128; Cr.0.8; ca 10.2; bili 0.6  Liver enzymes normal. eFR . 60.     Mammo 7/7/21 ACR BI-RADS Category 2 - Benign Findings       Lab Results   Component Value Date    WBC 9.79 04/23/2025    HGB 9.5 (L) 04/23/2025    HCT 29.3 (L) 04/23/2025    MCV 87 04/23/2025     04/23/2025 7/2023 mammo  THERE IS NO MAMMOGRAPHIC EVIDENCE OF MALIGNANCY.     A ROUTINE FOLLOW-UP MAMMOGRAM IN 1 YEAR IS RECOMMENDED.     ACR BI-RADS Category 1 - Negative      1. History of breast cancer    2. Anemia, unspecified type            Assessment & Plan:    Reviewed labs  with patient.   Carcinoma Right Breast  In 2013. Lumpectomy and radiation therapy followed by adjuvant Arimidex therapy.   Breast cancer Index did not show  Any benefit with extended harmonal therapy.   She completed five years of Arimidex in 2018  Follow up mammo 7/2024 -benign   Doing well   Cont to monitor    2. Hb 9.5g/dL on 4/23/25  Likely multifactorial   Colonoscopy 2024- unremarkalbe  Fe studies low  Restart Fe supp  SPEP 2022 shows There is a faint paraprotein band in gamma = 0.20 g/dL.   Plan repeat SPEP/MEKA, [facility]    Visit today included increased complexity associated with the care of the episodic problem anemia  addressed and managing the longitudinal care of the patient due to the  serious and/or complex managed problem(s)  hx of breast CA     Cbc,cmp  Ferritin, TIBC SPEP, immunofixation, free light chants, benny ig 1 week prior to f/u  4mos

## 2025-06-11 DIAGNOSIS — E11.9 TYPE 2 DIABETES MELLITUS WITHOUT COMPLICATION: ICD-10-CM

## 2025-06-13 ENCOUNTER — TELEPHONE (OUTPATIENT)
Dept: FAMILY MEDICINE | Facility: CLINIC | Age: 70
End: 2025-06-13
Payer: MEDICARE

## 2025-06-13 NOTE — TELEPHONE ENCOUNTER
Attempted to contact patient. Left a voice mail to call clinic back.Provider out of clinic on 08/20/2025

## 2025-06-17 ENCOUNTER — TELEPHONE (OUTPATIENT)
Dept: FAMILY MEDICINE | Facility: CLINIC | Age: 70
End: 2025-06-17
Payer: MEDICARE

## 2025-06-17 ENCOUNTER — PATIENT MESSAGE (OUTPATIENT)
Dept: ADMINISTRATIVE | Facility: HOSPITAL | Age: 70
End: 2025-06-17
Payer: MEDICARE

## 2025-06-17 NOTE — TELEPHONE ENCOUNTER
Attempted to call patient. Left voicemail to return back to clinic. Provider out of clinic on 08/20/2025. Message sent to patient

## 2025-06-17 NOTE — TELEPHONE ENCOUNTER
Copied from CRM #[id number]. Topic: General Inquiry - Return Call  >> Jun 17, 2025  9:19 AM Marsha wrote:  Type:  Patient Returning Call    Who Called: self     Who Left Message for Patient: Juwan    Does the patient know what this is regarding?:yes     Would the patient rather a call back or a response via [facility]? call    Best Call Back Number:.[phone] (home)     Additional Information:

## 2025-06-25 DIAGNOSIS — E11.9 CONTROLLED TYPE 2 DIABETES MELLITUS WITHOUT COMPLICATION, WITHOUT LONG-TERM CURRENT USE OF INSULIN: ICD-10-CM

## 2025-06-25 RX ORDER — DULAGLUTIDE 0.75 MG/.5ML
INJECTION, SOLUTION SUBCUTANEOUS
Qty: 4 PEN | Refills: 0 | Status: SHIPPED | OUTPATIENT
Start: 2025-06-25

## 2025-06-25 NOTE — TELEPHONE ENCOUNTER
Refill Routing Note   Medication(s) are not appropriate for processing by [facility] for the following reason(s):        Non-participating provider    [facility] action(s):  Route               Appointments  past 12m or future 3m with PCP    Date Provider   Last Visit   2/20/2025 Codie Diana NP   Next Visit   Visit date not found Codie Diana NP   ED visits in past 90 days: 0        Note composed:11:16 AM 06/25/2025

## 2025-07-07 DIAGNOSIS — E89.0 HYPOTHYROIDISM, POSTSURGICAL: ICD-10-CM

## 2025-07-07 NOTE — TELEPHONE ENCOUNTER
No care due was identified.  [facility] Embedded Care Due Messages. Reference number: [id number].   7/07/2025 12:58:48 PM CDT

## 2025-07-08 DIAGNOSIS — E89.0 HYPOTHYROIDISM, POSTSURGICAL: ICD-10-CM

## 2025-07-08 DIAGNOSIS — E78.5 HYPERLIPIDEMIA WITH TARGET LDL LESS THAN 100: Primary | ICD-10-CM

## 2025-07-08 RX ORDER — LEVOTHYROXINE SODIUM 150 UG/1
150 TABLET ORAL
Qty: 90 TABLET | Refills: 0 | Status: SHIPPED | OUTPATIENT
Start: 2025-07-08

## 2025-07-18 DIAGNOSIS — E11.65 UNCONTROLLED TYPE 2 DIABETES MELLITUS WITH HYPERGLYCEMIA: Primary | ICD-10-CM

## 2025-07-21 DIAGNOSIS — E11.9 CONTROLLED TYPE 2 DIABETES MELLITUS WITHOUT COMPLICATION, WITHOUT LONG-TERM CURRENT USE OF INSULIN: ICD-10-CM

## 2025-07-21 NOTE — TELEPHONE ENCOUNTER
Refill Routing Note   Medication(s) are not appropriate for processing by [facility] for the following reason(s):        Non-participating provider    [facility] action(s):  Route             Appointments  past 12m or future 3m with PCP    Date Provider   Last Visit   2/20/2025 Codie Diana NP   Next Visit   Visit date not found Codie Diana NP   ED visits in past 90 days: 0        Note composed:3:41 PM 07/21/2025

## 2025-07-22 RX ORDER — DULAGLUTIDE 0.75 MG/.5ML
INJECTION, SOLUTION SUBCUTANEOUS
Qty: 4 PEN | Refills: 2 | Status: SHIPPED | OUTPATIENT
Start: 2025-07-22

## 2025-08-03 DIAGNOSIS — I10 ESSENTIAL HYPERTENSION: ICD-10-CM

## 2025-08-04 RX ORDER — HYDROCHLOROTHIAZIDE 25 MG/1
25 TABLET ORAL
Qty: 90 TABLET | Refills: 1 | Status: SHIPPED | OUTPATIENT
Start: 2025-08-04

## 2025-08-20 ENCOUNTER — LAB VISIT (OUTPATIENT)
Dept: LAB | Facility: HOSPITAL | Age: 70
End: 2025-08-20
Attending: INTERNAL MEDICINE
Payer: MEDICARE

## 2025-08-20 DIAGNOSIS — E11.9 TYPE 2 DIABETES MELLITUS WITHOUT COMPLICATION: ICD-10-CM

## 2025-08-20 DIAGNOSIS — E78.5 HYPERLIPIDEMIA WITH TARGET LDL LESS THAN 100: ICD-10-CM

## 2025-08-20 DIAGNOSIS — E89.0 HYPOTHYROIDISM, POSTSURGICAL: ICD-10-CM

## 2025-08-20 LAB
CHOLEST SERPL-MCNC: 139 MG/DL (ref 120–199)
CHOLEST/HDLC SERPL: 3 {RATIO} (ref 2–5)
EAG (OHS): 177 MG/DL (ref 68–131)
HBA1C MFR BLD: 7.8 % (ref 4–5.6)
HDLC SERPL-MCNC: 46 MG/DL (ref 40–75)
HDLC SERPL: 33.1 % (ref 20–50)
LDLC SERPL CALC-MCNC: 73.4 MG/DL (ref 63–159)
NONHDLC SERPL-MCNC: 93 MG/DL
T4 FREE SERPL-MCNC: 1.02 NG/DL (ref 0.71–1.51)
TRIGL SERPL-MCNC: 98 MG/DL (ref 30–150)
TSH SERPL-ACNC: 8.86 UIU/ML (ref 0.4–4)

## 2025-08-20 PROCEDURE — 84439 ASSAY OF FREE THYROXINE: CPT

## 2025-08-20 PROCEDURE — 83036 HEMOGLOBIN GLYCOSYLATED A1C: CPT

## 2025-08-20 PROCEDURE — 84443 ASSAY THYROID STIM HORMONE: CPT

## 2025-08-20 PROCEDURE — 83718 ASSAY OF LIPOPROTEIN: CPT

## 2025-08-20 PROCEDURE — 36415 COLL VENOUS BLD VENIPUNCTURE: CPT | Mod: PO

## 2025-08-27 ENCOUNTER — OFFICE VISIT (OUTPATIENT)
Dept: FAMILY MEDICINE | Facility: CLINIC | Age: 70
End: 2025-08-27
Payer: MEDICARE

## 2025-08-27 VITALS
SYSTOLIC BLOOD PRESSURE: 136 MMHG | OXYGEN SATURATION: 97 % | DIASTOLIC BLOOD PRESSURE: 78 MMHG | WEIGHT: 221.88 LBS | TEMPERATURE: 98 F | HEART RATE: 64 BPM | HEIGHT: 66 IN | BODY MASS INDEX: 35.66 KG/M2

## 2025-08-27 DIAGNOSIS — Z23 NEED FOR SHINGLES VACCINE: ICD-10-CM

## 2025-08-27 DIAGNOSIS — M85.80 OSTEOPENIA AFTER MENOPAUSE: ICD-10-CM

## 2025-08-27 DIAGNOSIS — E11.65 UNCONTROLLED TYPE 2 DIABETES MELLITUS WITH HYPERGLYCEMIA: Primary | ICD-10-CM

## 2025-08-27 DIAGNOSIS — B37.2 INTERTRIGINOUS CANDIDIASIS: ICD-10-CM

## 2025-08-27 DIAGNOSIS — Z29.11 NEED FOR PROPHYLACTIC VACCINATION AND INOCULATION AGAINST RESPIRATORY SYNCYTIAL VIRUS (RSV): ICD-10-CM

## 2025-08-27 DIAGNOSIS — Z23 NEED FOR COVID-19 VACCINE: ICD-10-CM

## 2025-08-27 DIAGNOSIS — Z78.0 OSTEOPENIA AFTER MENOPAUSE: ICD-10-CM

## 2025-08-27 DIAGNOSIS — E78.5 HYPERLIPIDEMIA WITH TARGET LDL LESS THAN 100: ICD-10-CM

## 2025-08-27 DIAGNOSIS — I10 ESSENTIAL HYPERTENSION: ICD-10-CM

## 2025-08-27 DIAGNOSIS — Z23 NEED FOR TD VACCINE: ICD-10-CM

## 2025-08-27 DIAGNOSIS — D57.3 SICKLE CELL TRAIT: ICD-10-CM

## 2025-08-27 DIAGNOSIS — E89.0 HYPOTHYROIDISM, POSTSURGICAL: ICD-10-CM

## 2025-08-27 DIAGNOSIS — E66.01 SEVERE OBESITY (BMI 35.0-35.9 WITH COMORBIDITY): ICD-10-CM

## 2025-08-27 PROCEDURE — 1159F MED LIST DOCD IN RCRD: CPT | Mod: CPTII,S$GLB,, | Performed by: INTERNAL MEDICINE

## 2025-08-27 PROCEDURE — 1160F RVW MEDS BY RX/DR IN RCRD: CPT | Mod: CPTII,S$GLB,, | Performed by: INTERNAL MEDICINE

## 2025-08-27 PROCEDURE — 82043 UR ALBUMIN QUANTITATIVE: CPT | Performed by: INTERNAL MEDICINE

## 2025-08-27 PROCEDURE — 3051F HG A1C>EQUAL 7.0%<8.0%: CPT | Mod: CPTII,S$GLB,, | Performed by: INTERNAL MEDICINE

## 2025-08-27 PROCEDURE — 3060F POS MICROALBUMINURIA REV: CPT | Mod: CPTII,S$GLB,, | Performed by: INTERNAL MEDICINE

## 2025-08-27 PROCEDURE — 3075F SYST BP GE 130 - 139MM HG: CPT | Mod: CPTII,S$GLB,, | Performed by: INTERNAL MEDICINE

## 2025-08-27 PROCEDURE — 3008F BODY MASS INDEX DOCD: CPT | Mod: CPTII,S$GLB,, | Performed by: INTERNAL MEDICINE

## 2025-08-27 PROCEDURE — 99999 PR PBB SHADOW E&M-EST. PATIENT-LVL IV: CPT | Mod: PBBFAC,,, | Performed by: INTERNAL MEDICINE

## 2025-08-27 PROCEDURE — 3066F NEPHROPATHY DOC TX: CPT | Mod: CPTII,S$GLB,, | Performed by: INTERNAL MEDICINE

## 2025-08-27 PROCEDURE — 3078F DIAST BP <80 MM HG: CPT | Mod: CPTII,S$GLB,, | Performed by: INTERNAL MEDICINE

## 2025-08-27 PROCEDURE — 1101F PT FALLS ASSESS-DOCD LE1/YR: CPT | Mod: CPTII,S$GLB,, | Performed by: INTERNAL MEDICINE

## 2025-08-27 PROCEDURE — 4010F ACE/ARB THERAPY RXD/TAKEN: CPT | Mod: CPTII,S$GLB,, | Performed by: INTERNAL MEDICINE

## 2025-08-27 PROCEDURE — G2211 COMPLEX E/M VISIT ADD ON: HCPCS | Mod: S$GLB,,, | Performed by: INTERNAL MEDICINE

## 2025-08-27 PROCEDURE — 1126F AMNT PAIN NOTED NONE PRSNT: CPT | Mod: CPTII,S$GLB,, | Performed by: INTERNAL MEDICINE

## 2025-08-27 PROCEDURE — 3288F FALL RISK ASSESSMENT DOCD: CPT | Mod: CPTII,S$GLB,, | Performed by: INTERNAL MEDICINE

## 2025-08-27 PROCEDURE — 99214 OFFICE O/P EST MOD 30 MIN: CPT | Mod: S$GLB,,, | Performed by: INTERNAL MEDICINE

## 2025-08-27 RX ORDER — DULAGLUTIDE 1.5 MG/.5ML
1.5 INJECTION, SOLUTION SUBCUTANEOUS
Qty: 4 PEN | Refills: 5 | Status: SHIPPED | OUTPATIENT
Start: 2025-08-27

## 2025-08-27 RX ORDER — NYSTATIN 100000 U/G
CREAM TOPICAL 2 TIMES DAILY
Qty: 90 G | Refills: 2 | Status: SHIPPED | OUTPATIENT
Start: 2025-08-27

## 2025-08-27 RX ORDER — NYSTATIN 100000 [USP'U]/G
POWDER TOPICAL 4 TIMES DAILY
Qty: 180 G | Refills: 2 | Status: SHIPPED | OUTPATIENT
Start: 2025-08-27

## 2025-09-02 ENCOUNTER — LAB VISIT (OUTPATIENT)
Dept: LAB | Facility: HOSPITAL | Age: 70
End: 2025-09-02
Payer: MEDICARE

## 2025-09-02 DIAGNOSIS — D64.9 ANEMIA, UNSPECIFIED TYPE: ICD-10-CM

## 2025-09-02 LAB
ABSOLUTE EOSINOPHIL (OHS): 0.09 K/UL
ABSOLUTE MONOCYTE (OHS): 0.41 K/UL (ref 0.3–1)
ABSOLUTE NEUTROPHIL COUNT (OHS): 4.52 K/UL (ref 1.8–7.7)
BASOPHILS # BLD AUTO: 0.04 K/UL
BASOPHILS NFR BLD AUTO: 0.5 %
ERYTHROCYTE [DISTWIDTH] IN BLOOD BY AUTOMATED COUNT: 15.2 % (ref 11.5–14.5)
FERRITIN SERPL-MCNC: 20 NG/ML (ref 20–300)
HCT VFR BLD AUTO: 29.2 % (ref 37–48.5)
HGB BLD-MCNC: 9.5 GM/DL (ref 12–16)
IGA SERPL-MCNC: 221 MG/DL (ref 40–350)
IGG SERPL-MCNC: 1475 MG/DL (ref 650–1600)
IGM SERPL-MCNC: 17 MG/DL (ref 50–300)
IMM GRANULOCYTES # BLD AUTO: 0.04 K/UL (ref 0–0.04)
IMM GRANULOCYTES NFR BLD AUTO: 0.5 % (ref 0–0.5)
IRON SATN MFR SERPL: 11 % (ref 20–50)
IRON SERPL-MCNC: 45 UG/DL (ref 30–160)
LYMPHOCYTES # BLD AUTO: 3.34 K/UL (ref 1–4.8)
MCH RBC QN AUTO: 27.5 PG (ref 27–31)
MCHC RBC AUTO-ENTMCNC: 32.5 G/DL (ref 32–36)
MCV RBC AUTO: 85 FL (ref 82–98)
NUCLEATED RBC (/100WBC) (OHS): 0 /100 WBC
PLATELET # BLD AUTO: 310 K/UL (ref 150–450)
PMV BLD AUTO: 11.2 FL (ref 9.2–12.9)
RBC # BLD AUTO: 3.45 M/UL (ref 4–5.4)
RELATIVE EOSINOPHIL (OHS): 1.1 %
RELATIVE LYMPHOCYTE (OHS): 39.6 % (ref 18–48)
RELATIVE MONOCYTE (OHS): 4.9 % (ref 4–15)
RELATIVE NEUTROPHIL (OHS): 53.4 % (ref 38–73)
TIBC SERPL-MCNC: 398 UG/DL (ref 250–450)
TRANSFERRIN SERPL-MCNC: 269 MG/DL (ref 200–375)
WBC # BLD AUTO: 8.44 K/UL (ref 3.9–12.7)

## 2025-09-02 PROCEDURE — 85025 COMPLETE CBC W/AUTO DIFF WBC: CPT

## 2025-09-02 PROCEDURE — 82728 ASSAY OF FERRITIN: CPT

## 2025-09-02 PROCEDURE — 86334 IMMUNOFIX E-PHORESIS SERUM: CPT

## 2025-09-02 PROCEDURE — 83521 IG LIGHT CHAINS FREE EACH: CPT

## 2025-09-02 PROCEDURE — 84165 PROTEIN E-PHORESIS SERUM: CPT

## 2025-09-02 PROCEDURE — 82784 ASSAY IGA/IGD/IGG/IGM EACH: CPT

## 2025-09-02 PROCEDURE — 36415 COLL VENOUS BLD VENIPUNCTURE: CPT | Mod: PO

## 2025-09-02 PROCEDURE — 83540 ASSAY OF IRON: CPT

## 2025-09-03 ENCOUNTER — TELEPHONE (OUTPATIENT)
Dept: HEMATOLOGY/ONCOLOGY | Facility: CLINIC | Age: 70
End: 2025-09-03
Payer: MEDICARE

## 2025-09-03 LAB
ALBUMIN, SPE (OHS): 3.54 G/DL (ref 3.35–5.55)
ALPHA 1 GLOB (OHS): 0.33 GM/DL (ref 0.17–0.41)
ALPHA 2 GLOB (OHS): 0.72 GM/DL (ref 0.43–0.99)
BETA GLOB (OHS): 0.85 GM/DL (ref 0.5–1.1)
GAMMA GLOBULIN (OHS): 1.26 GM/DL (ref 0.67–1.58)
KAPPA LC FREE SER-MCNC: 1.48 MG/L (ref 0.26–1.65)
KAPPA LC FREE/LAMBDA FREE SER: 3.86 MG/DL (ref 0.33–1.94)
LAMBDA LC FREE SERPL-MCNC: 2.6 MG/DL (ref 0.57–2.63)
PROT SERPL-MCNC: 6.7 GM/DL (ref 6–8.4)

## 2025-09-04 LAB
PATHOLOGIST INTERPRETATION - IFE SERUM (OHS): NORMAL
PATHOLOGIST REVIEW - SPE (OHS): NORMAL